# Patient Record
Sex: FEMALE | Race: OTHER | Employment: FULL TIME | URBAN - METROPOLITAN AREA
[De-identification: names, ages, dates, MRNs, and addresses within clinical notes are randomized per-mention and may not be internally consistent; named-entity substitution may affect disease eponyms.]

---

## 2017-02-07 ENCOUNTER — HOSPITAL ENCOUNTER (OUTPATIENT)
Age: 42
Setting detail: SPECIMEN
Discharge: HOME | End: 2017-02-07
Payer: COMMERCIAL

## 2017-02-07 ENCOUNTER — OFFICE VISIT (OUTPATIENT)
Dept: OBGYN | Facility: CLINIC | Age: 42
End: 2017-02-07

## 2017-02-07 VITALS
DIASTOLIC BLOOD PRESSURE: 86 MMHG | WEIGHT: 160 LBS | RESPIRATION RATE: 16 BRPM | HEIGHT: 64 IN | HEART RATE: 66 BPM | SYSTOLIC BLOOD PRESSURE: 130 MMHG | BODY MASS INDEX: 27.31 KG/M2

## 2017-02-07 DIAGNOSIS — R35.0 URINE FREQUENCY: Primary | ICD-10-CM

## 2017-02-07 DIAGNOSIS — N93.9 ABNORMAL UTERINE BLEEDING (AUB): ICD-10-CM

## 2017-02-07 DIAGNOSIS — R82.90 ABNORMAL URINE ODOR: ICD-10-CM

## 2017-02-07 PROCEDURE — 81025 URINE PREGNANCY TEST: CPT | Performed by: OBSTETRICS & GYNECOLOGY

## 2017-02-07 PROCEDURE — 99213 OFFICE O/P EST LOW 20 MIN: CPT | Performed by: OBSTETRICS & GYNECOLOGY

## 2017-02-08 LAB
C TRACH DNA GENITAL QL NAA+PROBE: NEGATIVE
CULTURE: NORMAL
CULTURE: NORMAL
Lab: NORMAL
N. GONORRHOEAE DNA: NEGATIVE
SPECIMEN DESCRIPTION: NORMAL
STATUS: NORMAL

## 2017-02-14 ENCOUNTER — HOSPITAL ENCOUNTER (OUTPATIENT)
Dept: ULTRASOUND IMAGING | Age: 42
Discharge: HOME | End: 2017-02-14
Payer: COMMERCIAL

## 2017-02-14 DIAGNOSIS — N93.9 ABNORMAL UTERINE BLEEDING (AUB): ICD-10-CM

## 2017-02-14 PROCEDURE — 76856 US EXAM PELVIC COMPLETE: CPT

## 2017-02-14 PROCEDURE — 76830 TRANSVAGINAL US NON-OB: CPT

## 2017-02-15 ENCOUNTER — TELEPHONE (OUTPATIENT)
Dept: OBGYN | Facility: CLINIC | Age: 42
End: 2017-02-15

## 2017-02-16 ENCOUNTER — OFFICE VISIT (OUTPATIENT)
Dept: OBGYN | Facility: CLINIC | Age: 42
End: 2017-02-16

## 2017-02-16 VITALS — DIASTOLIC BLOOD PRESSURE: 77 MMHG | HEART RATE: 69 BPM | SYSTOLIC BLOOD PRESSURE: 122 MMHG

## 2017-02-16 DIAGNOSIS — Z09 FOLLOW UP: Primary | ICD-10-CM

## 2017-02-16 PROCEDURE — 99213 OFFICE O/P EST LOW 20 MIN: CPT | Performed by: ADVANCED PRACTICE MIDWIFE

## 2017-04-18 ENCOUNTER — OFFICE VISIT (OUTPATIENT)
Dept: OBGYN CLINIC | Age: 42
End: 2017-04-18
Payer: COMMERCIAL

## 2017-04-18 VITALS
DIASTOLIC BLOOD PRESSURE: 77 MMHG | HEIGHT: 65 IN | HEART RATE: 74 BPM | BODY MASS INDEX: 26.64 KG/M2 | SYSTOLIC BLOOD PRESSURE: 124 MMHG | WEIGHT: 159.9 LBS

## 2017-04-18 DIAGNOSIS — N93.9 ABNORMAL UTERINE BLEEDING (AUB): Primary | ICD-10-CM

## 2017-04-18 PROCEDURE — 99212 OFFICE O/P EST SF 10 MIN: CPT | Performed by: OBSTETRICS & GYNECOLOGY

## 2017-06-28 ENCOUNTER — OFFICE VISIT (OUTPATIENT)
Dept: OBGYN CLINIC | Age: 42
End: 2017-06-28
Payer: COMMERCIAL

## 2017-06-28 ENCOUNTER — HOSPITAL ENCOUNTER (OUTPATIENT)
Age: 42
Setting detail: SPECIMEN
Discharge: HOME | End: 2017-06-28
Payer: COMMERCIAL

## 2017-06-28 VITALS
SYSTOLIC BLOOD PRESSURE: 128 MMHG | OXYGEN SATURATION: 100 % | HEIGHT: 64 IN | DIASTOLIC BLOOD PRESSURE: 79 MMHG | BODY MASS INDEX: 27.14 KG/M2 | WEIGHT: 159 LBS | RESPIRATION RATE: 16 BRPM | HEART RATE: 63 BPM

## 2017-06-28 DIAGNOSIS — Z13.9 SCREENING: Primary | ICD-10-CM

## 2017-06-28 DIAGNOSIS — Z13.9 SCREENING: ICD-10-CM

## 2017-06-28 LAB
CONTROL: PRESENT
PREGNANCY TEST URINE, POC: NORMAL

## 2017-06-28 PROCEDURE — 81025 URINE PREGNANCY TEST: CPT | Performed by: OBSTETRICS & GYNECOLOGY

## 2017-06-28 PROCEDURE — 99401 PREV MED CNSL INDIV APPRX 15: CPT | Performed by: OBSTETRICS & GYNECOLOGY

## 2017-06-29 LAB
C. TRACHOMATIS DNA ,URINE: NEGATIVE
N. GONORRHOEAE DNA, URINE: NEGATIVE

## 2017-07-05 ENCOUNTER — HOSPITAL ENCOUNTER (OUTPATIENT)
Age: 42
Discharge: HOME | End: 2017-07-05
Payer: COMMERCIAL

## 2017-07-05 DIAGNOSIS — Z13.9 SCREENING: ICD-10-CM

## 2017-07-05 LAB
HEPATITIS B SURFACE ANTIGEN: NONREACTIVE
HEPATITIS C ANTIBODY: NONREACTIVE
HIV AG/AB: NONREACTIVE
T. PALLIDUM, IGG: NONREACTIVE

## 2017-07-05 PROCEDURE — 87389 HIV-1 AG W/HIV-1&-2 AB AG IA: CPT

## 2017-07-05 PROCEDURE — 86696 HERPES SIMPLEX TYPE 2 TEST: CPT

## 2017-07-05 PROCEDURE — 86695 HERPES SIMPLEX TYPE 1 TEST: CPT

## 2017-07-05 PROCEDURE — 86694 HERPES SIMPLEX NES ANTBDY: CPT

## 2017-07-05 PROCEDURE — 87340 HEPATITIS B SURFACE AG IA: CPT

## 2017-07-05 PROCEDURE — 86780 TREPONEMA PALLIDUM: CPT

## 2017-07-05 PROCEDURE — 86803 HEPATITIS C AB TEST: CPT

## 2017-07-05 PROCEDURE — 36415 COLL VENOUS BLD VENIPUNCTURE: CPT

## 2017-07-06 LAB
HERPES SIMPLEX VIRUS 1 IGG: 3.74
HERPES SIMPLEX VIRUS 2 IGG: 0.1
HERPES TYPE 1/2 IGM COMBINED: 1.73

## 2017-08-07 ENCOUNTER — HOSPITAL ENCOUNTER (OUTPATIENT)
Dept: ULTRASOUND IMAGING | Age: 42
Discharge: HOME | End: 2017-08-07
Payer: COMMERCIAL

## 2017-08-07 DIAGNOSIS — N93.9 ABNORMAL UTERINE BLEEDING (AUB): ICD-10-CM

## 2017-08-07 PROCEDURE — 76856 US EXAM PELVIC COMPLETE: CPT

## 2017-08-07 PROCEDURE — 76830 TRANSVAGINAL US NON-OB: CPT

## 2017-08-21 ENCOUNTER — HOSPITAL ENCOUNTER (OUTPATIENT)
Age: 42
Setting detail: SPECIMEN
Discharge: HOME | End: 2017-08-21
Payer: COMMERCIAL

## 2017-08-21 ENCOUNTER — OFFICE VISIT (OUTPATIENT)
Dept: OBGYN CLINIC | Age: 42
End: 2017-08-21
Payer: COMMERCIAL

## 2017-08-21 VITALS
BODY MASS INDEX: 27.14 KG/M2 | DIASTOLIC BLOOD PRESSURE: 84 MMHG | SYSTOLIC BLOOD PRESSURE: 126 MMHG | HEART RATE: 70 BPM | WEIGHT: 159 LBS | HEIGHT: 64 IN

## 2017-08-21 DIAGNOSIS — N89.8 VAGINAL DISCHARGE: ICD-10-CM

## 2017-08-21 DIAGNOSIS — N89.8 VAGINAL DISCHARGE: Primary | ICD-10-CM

## 2017-08-21 DIAGNOSIS — N89.8 VAGINAL ODOR: ICD-10-CM

## 2017-08-21 LAB
DIRECT EXAM: ABNORMAL
Lab: ABNORMAL
SPECIMEN DESCRIPTION: ABNORMAL
STATUS: ABNORMAL

## 2017-08-21 PROCEDURE — 99213 OFFICE O/P EST LOW 20 MIN: CPT | Performed by: ADVANCED PRACTICE MIDWIFE

## 2017-08-22 ENCOUNTER — TELEPHONE (OUTPATIENT)
Dept: OBGYN CLINIC | Age: 42
End: 2017-08-22

## 2017-08-22 DIAGNOSIS — B96.89 BV (BACTERIAL VAGINOSIS): Primary | ICD-10-CM

## 2017-08-22 DIAGNOSIS — N76.0 BV (BACTERIAL VAGINOSIS): Primary | ICD-10-CM

## 2017-08-22 LAB
CULTURE: NORMAL
CULTURE: NORMAL
Lab: NORMAL
SPECIMEN DESCRIPTION: NORMAL
STATUS: NORMAL

## 2017-08-22 RX ORDER — METRONIDAZOLE 500 MG/1
500 TABLET ORAL 2 TIMES DAILY
Qty: 14 TABLET | Refills: 0 | Status: SHIPPED | OUTPATIENT
Start: 2017-08-22 | End: 2017-08-29

## 2017-08-24 LAB
CULTURE: ABNORMAL
Lab: ABNORMAL
SPECIMEN DESCRIPTION: ABNORMAL
STATUS: ABNORMAL

## 2017-08-31 ENCOUNTER — APPOINTMENT (OUTPATIENT)
Dept: GENERAL RADIOLOGY | Age: 42
End: 2017-08-31
Payer: COMMERCIAL

## 2017-08-31 ENCOUNTER — HOSPITAL ENCOUNTER (EMERGENCY)
Age: 42
Discharge: HOME | End: 2017-09-01
Attending: EMERGENCY MEDICINE
Payer: COMMERCIAL

## 2017-08-31 ENCOUNTER — APPOINTMENT (OUTPATIENT)
Dept: CT IMAGING | Age: 42
End: 2017-08-31
Payer: COMMERCIAL

## 2017-08-31 VITALS
DIASTOLIC BLOOD PRESSURE: 88 MMHG | WEIGHT: 162.06 LBS | SYSTOLIC BLOOD PRESSURE: 138 MMHG | OXYGEN SATURATION: 98 % | RESPIRATION RATE: 16 BRPM | HEART RATE: 89 BPM | HEIGHT: 64 IN | TEMPERATURE: 97.9 F | BODY MASS INDEX: 27.67 KG/M2

## 2017-08-31 DIAGNOSIS — G62.9 NEUROPATHY: ICD-10-CM

## 2017-08-31 DIAGNOSIS — M62.838 CERVICAL PARASPINAL MUSCLE SPASM: ICD-10-CM

## 2017-08-31 DIAGNOSIS — M54.17 RADICULOPATHY OF LUMBOSACRAL REGION: Primary | ICD-10-CM

## 2017-08-31 LAB
ABSOLUTE EOS #: 0.2 K/UL (ref 0–0.4)
ABSOLUTE LYMPH #: 2.8 K/UL (ref 1–4.8)
ABSOLUTE MONO #: 0.5 K/UL (ref 0.2–0.8)
ANION GAP SERPL CALCULATED.3IONS-SCNC: 13 MMOL/L (ref 9–17)
BASOPHILS # BLD: 1 %
BASOPHILS ABSOLUTE: 0.1 K/UL (ref 0–0.2)
BUN BLDV-MCNC: 14 MG/DL (ref 6–20)
BUN/CREAT BLD: 25 (ref 9–20)
CALCIUM SERPL-MCNC: 9 MG/DL (ref 8.6–10.4)
CHLORIDE BLD-SCNC: 100 MMOL/L (ref 98–107)
CO2: 23 MMOL/L (ref 20–31)
CREAT SERPL-MCNC: 0.57 MG/DL (ref 0.5–0.9)
DIFFERENTIAL TYPE: NORMAL
EOSINOPHILS RELATIVE PERCENT: 2 %
GFR AFRICAN AMERICAN: >60 ML/MIN
GFR NON-AFRICAN AMERICAN: >60 ML/MIN
GFR SERPL CREATININE-BSD FRML MDRD: ABNORMAL ML/MIN/{1.73_M2}
GFR SERPL CREATININE-BSD FRML MDRD: ABNORMAL ML/MIN/{1.73_M2}
GLUCOSE BLD-MCNC: 87 MG/DL (ref 70–99)
HCT VFR BLD CALC: 37.5 % (ref 36–46)
HEMOGLOBIN: 12.6 G/DL (ref 12–16)
LYMPHOCYTES # BLD: 38 %
MAGNESIUM: 2 MG/DL (ref 1.6–2.6)
MCH RBC QN AUTO: 29.4 PG (ref 26–34)
MCHC RBC AUTO-ENTMCNC: 33.6 G/DL (ref 31–37)
MCV RBC AUTO: 87.4 FL (ref 80–100)
MONOCYTES # BLD: 7 %
PDW BLD-RTO: 13.1 % (ref 11.5–14.5)
PLATELET # BLD: 306 K/UL (ref 130–400)
PLATELET ESTIMATE: NORMAL
PMV BLD AUTO: NORMAL FL (ref 6–12)
POTASSIUM SERPL-SCNC: 4.1 MMOL/L (ref 3.7–5.3)
RBC # BLD: 4.29 M/UL (ref 4–5.2)
RBC # BLD: NORMAL 10*6/UL
SEG NEUTROPHILS: 52 %
SEGMENTED NEUTROPHILS ABSOLUTE COUNT: 3.8 K/UL (ref 1.8–7.7)
SODIUM BLD-SCNC: 136 MMOL/L (ref 135–144)
THYROXINE, FREE: 1.62 NG/DL (ref 0.93–1.7)
TSH SERPL DL<=0.05 MIU/L-ACNC: 0.13 MIU/L (ref 0.3–5)
WBC # BLD: 7.4 K/UL (ref 3.5–11)
WBC # BLD: NORMAL 10*3/UL

## 2017-08-31 PROCEDURE — 84443 ASSAY THYROID STIM HORMONE: CPT

## 2017-08-31 PROCEDURE — 80048 BASIC METABOLIC PNL TOTAL CA: CPT

## 2017-08-31 PROCEDURE — 72100 X-RAY EXAM L-S SPINE 2/3 VWS: CPT

## 2017-08-31 PROCEDURE — 70450 CT HEAD/BRAIN W/O DYE: CPT

## 2017-08-31 PROCEDURE — 83735 ASSAY OF MAGNESIUM: CPT

## 2017-08-31 PROCEDURE — 85025 COMPLETE CBC W/AUTO DIFF WBC: CPT

## 2017-08-31 PROCEDURE — 99285 EMERGENCY DEPT VISIT HI MDM: CPT

## 2017-08-31 PROCEDURE — 72040 X-RAY EXAM NECK SPINE 2-3 VW: CPT

## 2017-08-31 PROCEDURE — 84439 ASSAY OF FREE THYROXINE: CPT

## 2017-08-31 RX ORDER — METRONIDAZOLE 500 MG/1
500 TABLET ORAL 3 TIMES DAILY
Status: ON HOLD | COMMUNITY
End: 2019-03-07

## 2017-08-31 RX ORDER — IBUPROFEN 800 MG/1
800 TABLET ORAL EVERY 8 HOURS PRN
Qty: 30 TABLET | Refills: 0 | Status: ON HOLD | OUTPATIENT
Start: 2017-08-31 | End: 2019-03-07 | Stop reason: SDUPTHER

## 2017-08-31 RX ORDER — METHOCARBAMOL 750 MG/1
750 TABLET, FILM COATED ORAL 4 TIMES DAILY
Qty: 40 TABLET | Refills: 0 | Status: SHIPPED | OUTPATIENT
Start: 2017-08-31 | End: 2017-09-10

## 2017-09-05 ENCOUNTER — TELEPHONE (OUTPATIENT)
Dept: OBGYN CLINIC | Age: 42
End: 2017-09-05

## 2019-01-03 ENCOUNTER — TELEPHONE (OUTPATIENT)
Dept: OBGYN CLINIC | Age: 44
End: 2019-01-03

## 2019-01-10 ENCOUNTER — OFFICE VISIT (OUTPATIENT)
Dept: OBGYN CLINIC | Age: 44
End: 2019-01-10
Payer: COMMERCIAL

## 2019-01-10 ENCOUNTER — HOSPITAL ENCOUNTER (OUTPATIENT)
Age: 44
Setting detail: SPECIMEN
Discharge: HOME | End: 2019-01-10
Payer: COMMERCIAL

## 2019-01-10 VITALS
DIASTOLIC BLOOD PRESSURE: 64 MMHG | WEIGHT: 160.25 LBS | SYSTOLIC BLOOD PRESSURE: 132 MMHG | HEART RATE: 77 BPM | BODY MASS INDEX: 27.36 KG/M2 | HEIGHT: 64 IN

## 2019-01-10 DIAGNOSIS — Z11.3 SCREENING EXAMINATION FOR STD (SEXUALLY TRANSMITTED DISEASE): ICD-10-CM

## 2019-01-10 DIAGNOSIS — K42.9 UMBILICAL HERNIA WITHOUT OBSTRUCTION AND WITHOUT GANGRENE: ICD-10-CM

## 2019-01-10 DIAGNOSIS — Z01.419 WELL WOMAN EXAM WITH ROUTINE GYNECOLOGICAL EXAM: Primary | ICD-10-CM

## 2019-01-10 PROCEDURE — 99396 PREV VISIT EST AGE 40-64: CPT | Performed by: OBSTETRICS & GYNECOLOGY

## 2019-01-10 PROCEDURE — G8484 FLU IMMUNIZE NO ADMIN: HCPCS | Performed by: OBSTETRICS & GYNECOLOGY

## 2019-01-10 RX ORDER — NORGESTIMATE AND ETHINYL ESTRADIOL 0.25-0.035
1 KIT ORAL DAILY
Qty: 1 PACKET | Refills: 11 | Status: ON HOLD | OUTPATIENT
Start: 2019-01-10 | End: 2019-03-07

## 2019-01-12 LAB
CHLAMYDIA BY THIN PREP: NEGATIVE
N. GONORRHOEAE DNA, THIN PREP: NEGATIVE

## 2019-01-22 ENCOUNTER — HOSPITAL ENCOUNTER (OUTPATIENT)
Age: 44
Discharge: HOME | End: 2019-01-22
Payer: COMMERCIAL

## 2019-01-22 DIAGNOSIS — Z11.3 SCREENING EXAMINATION FOR STD (SEXUALLY TRANSMITTED DISEASE): ICD-10-CM

## 2019-01-22 LAB
HEPATITIS C ANTIBODY: NONREACTIVE
HIV AG/AB: NONREACTIVE
T. PALLIDUM, IGG: NONREACTIVE

## 2019-01-22 PROCEDURE — 36415 COLL VENOUS BLD VENIPUNCTURE: CPT

## 2019-01-22 PROCEDURE — 86780 TREPONEMA PALLIDUM: CPT

## 2019-01-22 PROCEDURE — 86695 HERPES SIMPLEX TYPE 1 TEST: CPT

## 2019-01-22 PROCEDURE — 86694 HERPES SIMPLEX NES ANTBDY: CPT

## 2019-01-22 PROCEDURE — 86803 HEPATITIS C AB TEST: CPT

## 2019-01-22 PROCEDURE — 87389 HIV-1 AG W/HIV-1&-2 AB AG IA: CPT

## 2019-01-22 PROCEDURE — 86696 HERPES SIMPLEX TYPE 2 TEST: CPT

## 2019-01-24 LAB
HERPES SIMPLEX VIRUS 1 IGG: 3.4
HERPES SIMPLEX VIRUS 2 IGG: 0.25
HERPES TYPE 1/2 IGM COMBINED: 1.25

## 2019-01-25 LAB — CYTOLOGY REPORT: NORMAL

## 2019-01-28 ENCOUNTER — TELEPHONE (OUTPATIENT)
Dept: OBGYN CLINIC | Age: 44
End: 2019-01-28

## 2019-03-07 ENCOUNTER — HOSPITAL ENCOUNTER (OUTPATIENT)
Age: 44
Setting detail: OUTPATIENT SURGERY
Discharge: HOME | End: 2019-03-07
Attending: SURGERY | Admitting: SURGERY
Payer: COMMERCIAL

## 2019-03-07 ENCOUNTER — ANESTHESIA EVENT (OUTPATIENT)
Dept: OPERATING ROOM | Age: 44
End: 2019-03-07
Payer: COMMERCIAL

## 2019-03-07 ENCOUNTER — ANESTHESIA (OUTPATIENT)
Dept: OPERATING ROOM | Age: 44
End: 2019-03-07
Payer: COMMERCIAL

## 2019-03-07 VITALS
TEMPERATURE: 96.4 F | OXYGEN SATURATION: 100 % | RESPIRATION RATE: 12 BRPM | DIASTOLIC BLOOD PRESSURE: 83 MMHG | SYSTOLIC BLOOD PRESSURE: 126 MMHG

## 2019-03-07 VITALS
RESPIRATION RATE: 12 BRPM | HEART RATE: 61 BPM | HEIGHT: 65 IN | DIASTOLIC BLOOD PRESSURE: 71 MMHG | WEIGHT: 160 LBS | BODY MASS INDEX: 26.66 KG/M2 | OXYGEN SATURATION: 96 % | TEMPERATURE: 97.3 F | SYSTOLIC BLOOD PRESSURE: 128 MMHG

## 2019-03-07 PROBLEM — K42.9 UMBILICAL HERNIA WITHOUT OBSTRUCTION AND WITHOUT GANGRENE: Chronic | Status: ACTIVE | Noted: 2019-03-07

## 2019-03-07 PROBLEM — N93.9 ABNORMAL UTERINE BLEEDING: Status: ACTIVE | Noted: 2017-08-11

## 2019-03-07 PROBLEM — Z98.890 S/P HERNIA REPAIR: Chronic | Status: ACTIVE | Noted: 2019-03-07

## 2019-03-07 PROBLEM — E03.9 HYPOTHYROIDISM: Status: ACTIVE | Noted: 2017-01-19

## 2019-03-07 PROBLEM — Z87.19 S/P HERNIA REPAIR: Chronic | Status: ACTIVE | Noted: 2019-03-07

## 2019-03-07 LAB
-: NORMAL
HCG, PREGNANCY URINE (POC): NEGATIVE

## 2019-03-07 PROCEDURE — 2580000003 HC RX 258: Performed by: ANESTHESIOLOGY

## 2019-03-07 PROCEDURE — 7100000031 HC ASPR PHASE II RECOVERY - ADDTL 15 MIN: Performed by: SURGERY

## 2019-03-07 PROCEDURE — 7100000010 HC PHASE II RECOVERY - FIRST 15 MIN: Performed by: SURGERY

## 2019-03-07 PROCEDURE — C1781 MESH (IMPLANTABLE): HCPCS | Performed by: SURGERY

## 2019-03-07 PROCEDURE — 2500000003 HC RX 250 WO HCPCS: Performed by: SURGERY

## 2019-03-07 PROCEDURE — 3600000012 HC SURGERY LEVEL 2 ADDTL 15MIN: Performed by: SURGERY

## 2019-03-07 PROCEDURE — 2500000003 HC RX 250 WO HCPCS: Performed by: NURSE ANESTHETIST, CERTIFIED REGISTERED

## 2019-03-07 PROCEDURE — 3700000000 HC ANESTHESIA ATTENDED CARE: Performed by: SURGERY

## 2019-03-07 PROCEDURE — 2709999900 HC NON-CHARGEABLE SUPPLY: Performed by: SURGERY

## 2019-03-07 PROCEDURE — 3600000002 HC SURGERY LEVEL 2 BASE: Performed by: SURGERY

## 2019-03-07 PROCEDURE — 3700000001 HC ADD 15 MINUTES (ANESTHESIA): Performed by: SURGERY

## 2019-03-07 PROCEDURE — 7100000011 HC PHASE II RECOVERY - ADDTL 15 MIN: Performed by: SURGERY

## 2019-03-07 PROCEDURE — 6360000002 HC RX W HCPCS: Performed by: SURGERY

## 2019-03-07 PROCEDURE — 84703 CHORIONIC GONADOTROPIN ASSAY: CPT

## 2019-03-07 PROCEDURE — 7100000030 HC ASPR PHASE II RECOVERY - FIRST 15 MIN: Performed by: SURGERY

## 2019-03-07 PROCEDURE — 7100000001 HC PACU RECOVERY - ADDTL 15 MIN: Performed by: SURGERY

## 2019-03-07 PROCEDURE — 88302 TISSUE EXAM BY PATHOLOGIST: CPT

## 2019-03-07 PROCEDURE — 6360000002 HC RX W HCPCS: Performed by: ANESTHESIOLOGY

## 2019-03-07 PROCEDURE — 6360000002 HC RX W HCPCS: Performed by: NURSE ANESTHETIST, CERTIFIED REGISTERED

## 2019-03-07 PROCEDURE — 7100000000 HC PACU RECOVERY - FIRST 15 MIN: Performed by: SURGERY

## 2019-03-07 DEVICE — IMPLANTABLE DEVICE: Type: IMPLANTABLE DEVICE | Site: UMBILICAL | Status: FUNCTIONAL

## 2019-03-07 RX ORDER — IBUPROFEN 800 MG/1
800 TABLET ORAL EVERY 8 HOURS PRN
Qty: 30 TABLET | Refills: 1 | Status: ON HOLD | OUTPATIENT
Start: 2019-03-07 | End: 2022-01-07

## 2019-03-07 RX ORDER — OXYCODONE HYDROCHLORIDE AND ACETAMINOPHEN 5; 325 MG/1; MG/1
1 TABLET ORAL
Status: DISCONTINUED | OUTPATIENT
Start: 2019-03-07 | End: 2019-03-07 | Stop reason: HOSPADM

## 2019-03-07 RX ORDER — MEPERIDINE HYDROCHLORIDE 50 MG/ML
12.5 INJECTION INTRAMUSCULAR; INTRAVENOUS; SUBCUTANEOUS EVERY 5 MIN PRN
Status: DISCONTINUED | OUTPATIENT
Start: 2019-03-07 | End: 2019-03-07 | Stop reason: HOSPADM

## 2019-03-07 RX ORDER — SODIUM CHLORIDE, SODIUM LACTATE, POTASSIUM CHLORIDE, CALCIUM CHLORIDE 600; 310; 30; 20 MG/100ML; MG/100ML; MG/100ML; MG/100ML
INJECTION, SOLUTION INTRAVENOUS CONTINUOUS
Status: DISCONTINUED | OUTPATIENT
Start: 2019-03-07 | End: 2019-03-07 | Stop reason: HOSPADM

## 2019-03-07 RX ORDER — MORPHINE SULFATE 2 MG/ML
2 INJECTION, SOLUTION INTRAMUSCULAR; INTRAVENOUS EVERY 5 MIN PRN
Status: DISCONTINUED | OUTPATIENT
Start: 2019-03-07 | End: 2019-03-07 | Stop reason: HOSPADM

## 2019-03-07 RX ORDER — DEXAMETHASONE SODIUM PHOSPHATE 4 MG/ML
INJECTION, SOLUTION INTRA-ARTICULAR; INTRALESIONAL; INTRAMUSCULAR; INTRAVENOUS; SOFT TISSUE PRN
Status: DISCONTINUED | OUTPATIENT
Start: 2019-03-07 | End: 2019-03-07 | Stop reason: SDUPTHER

## 2019-03-07 RX ORDER — LIDOCAINE HYDROCHLORIDE 10 MG/ML
INJECTION, SOLUTION EPIDURAL; INFILTRATION; INTRACAUDAL; PERINEURAL PRN
Status: DISCONTINUED | OUTPATIENT
Start: 2019-03-07 | End: 2019-03-07 | Stop reason: SDUPTHER

## 2019-03-07 RX ORDER — MIDAZOLAM HYDROCHLORIDE 1 MG/ML
INJECTION INTRAMUSCULAR; INTRAVENOUS PRN
Status: DISCONTINUED | OUTPATIENT
Start: 2019-03-07 | End: 2019-03-07 | Stop reason: SDUPTHER

## 2019-03-07 RX ORDER — PROPOFOL 10 MG/ML
INJECTION, EMULSION INTRAVENOUS PRN
Status: DISCONTINUED | OUTPATIENT
Start: 2019-03-07 | End: 2019-03-07 | Stop reason: SDUPTHER

## 2019-03-07 RX ORDER — FENTANYL CITRATE 50 UG/ML
INJECTION, SOLUTION INTRAMUSCULAR; INTRAVENOUS PRN
Status: DISCONTINUED | OUTPATIENT
Start: 2019-03-07 | End: 2019-03-07 | Stop reason: SDUPTHER

## 2019-03-07 RX ORDER — ONDANSETRON 2 MG/ML
INJECTION INTRAMUSCULAR; INTRAVENOUS PRN
Status: DISCONTINUED | OUTPATIENT
Start: 2019-03-07 | End: 2019-03-07 | Stop reason: SDUPTHER

## 2019-03-07 RX ORDER — DIPHENHYDRAMINE HYDROCHLORIDE 50 MG/ML
12.5 INJECTION INTRAMUSCULAR; INTRAVENOUS
Status: DISCONTINUED | OUTPATIENT
Start: 2019-03-07 | End: 2019-03-07 | Stop reason: HOSPADM

## 2019-03-07 RX ORDER — ONDANSETRON 2 MG/ML
4 INJECTION INTRAMUSCULAR; INTRAVENOUS
Status: DISCONTINUED | OUTPATIENT
Start: 2019-03-07 | End: 2019-03-07 | Stop reason: HOSPADM

## 2019-03-07 RX ORDER — M-VIT,TX,IRON,MINS/CALC/FOLIC 27MG-0.4MG
1 TABLET ORAL DAILY
COMMUNITY

## 2019-03-07 RX ORDER — KETOROLAC TROMETHAMINE 30 MG/ML
INJECTION, SOLUTION INTRAMUSCULAR; INTRAVENOUS PRN
Status: DISCONTINUED | OUTPATIENT
Start: 2019-03-07 | End: 2019-03-07 | Stop reason: SDUPTHER

## 2019-03-07 RX ORDER — BUPIVACAINE HYDROCHLORIDE 5 MG/ML
INJECTION, SOLUTION EPIDURAL; INTRACAUDAL PRN
Status: DISCONTINUED | OUTPATIENT
Start: 2019-03-07 | End: 2019-03-07 | Stop reason: ALTCHOICE

## 2019-03-07 RX ADMIN — Medication 2 G: at 07:57

## 2019-03-07 RX ADMIN — MIDAZOLAM 2 MG: 1 INJECTION INTRAMUSCULAR; INTRAVENOUS at 07:57

## 2019-03-07 RX ADMIN — FENTANYL CITRATE 100 MCG: 50 INJECTION, SOLUTION INTRAMUSCULAR; INTRAVENOUS at 08:02

## 2019-03-07 RX ADMIN — HYDROMORPHONE HYDROCHLORIDE 0.5 MG: 1 INJECTION, SOLUTION INTRAMUSCULAR; INTRAVENOUS; SUBCUTANEOUS at 08:53

## 2019-03-07 RX ADMIN — PROPOFOL 150 MG: 10 INJECTION, EMULSION INTRAVENOUS at 08:02

## 2019-03-07 RX ADMIN — ONDANSETRON 4 MG: 2 INJECTION INTRAMUSCULAR; INTRAVENOUS at 08:20

## 2019-03-07 RX ADMIN — LIDOCAINE HYDROCHLORIDE 50 MG: 10 INJECTION, SOLUTION EPIDURAL; INFILTRATION; INTRACAUDAL; PERINEURAL at 08:02

## 2019-03-07 RX ADMIN — DEXAMETHASONE SODIUM PHOSPHATE 4 MG: 4 INJECTION, SOLUTION INTRAMUSCULAR; INTRAVENOUS at 08:20

## 2019-03-07 RX ADMIN — SODIUM CHLORIDE, POTASSIUM CHLORIDE, SODIUM LACTATE AND CALCIUM CHLORIDE: 600; 310; 30; 20 INJECTION, SOLUTION INTRAVENOUS at 07:00

## 2019-03-07 RX ADMIN — KETOROLAC TROMETHAMINE 30 MG: 30 INJECTION, SOLUTION INTRAMUSCULAR at 08:18

## 2019-03-07 RX ADMIN — SODIUM CHLORIDE, POTASSIUM CHLORIDE, SODIUM LACTATE AND CALCIUM CHLORIDE: 600; 310; 30; 20 INJECTION, SOLUTION INTRAVENOUS at 08:28

## 2019-03-07 ASSESSMENT — PULMONARY FUNCTION TESTS
PIF_VALUE: 16
PIF_VALUE: 15
PIF_VALUE: 1
PIF_VALUE: 15
PIF_VALUE: 16
PIF_VALUE: 14
PIF_VALUE: 15
PIF_VALUE: 18
PIF_VALUE: 15
PIF_VALUE: 16
PIF_VALUE: 19
PIF_VALUE: 17
PIF_VALUE: 15
PIF_VALUE: 19
PIF_VALUE: 22
PIF_VALUE: 12
PIF_VALUE: 16
PIF_VALUE: 12
PIF_VALUE: 16
PIF_VALUE: 19
PIF_VALUE: 18
PIF_VALUE: 19
PIF_VALUE: 1
PIF_VALUE: 17
PIF_VALUE: 1
PIF_VALUE: 16
PIF_VALUE: 14
PIF_VALUE: 16
PIF_VALUE: 17
PIF_VALUE: 20
PIF_VALUE: 16
PIF_VALUE: 18
PIF_VALUE: 14
PIF_VALUE: 16
PIF_VALUE: 18
PIF_VALUE: 18
PIF_VALUE: 15

## 2019-03-07 ASSESSMENT — PAIN SCALES - GENERAL
PAINLEVEL_OUTOF10: 5
PAINLEVEL_OUTOF10: 4
PAINLEVEL_OUTOF10: 3
PAINLEVEL_OUTOF10: 0

## 2019-03-07 ASSESSMENT — PAIN - FUNCTIONAL ASSESSMENT: PAIN_FUNCTIONAL_ASSESSMENT: 0-10

## 2019-03-07 ASSESSMENT — PAIN DESCRIPTION - DESCRIPTORS
DESCRIPTORS: DISCOMFORT
DESCRIPTORS: SORE

## 2019-03-07 ASSESSMENT — PAIN DESCRIPTION - LOCATION
LOCATION: ABDOMEN
LOCATION: ABDOMEN

## 2019-03-11 LAB — SURGICAL PATHOLOGY REPORT: NORMAL

## 2019-04-01 ENCOUNTER — HOSPITAL ENCOUNTER (EMERGENCY)
Age: 44
Discharge: HOME | End: 2019-04-01
Attending: EMERGENCY MEDICINE
Payer: COMMERCIAL

## 2019-04-01 ENCOUNTER — APPOINTMENT (OUTPATIENT)
Dept: CT IMAGING | Age: 44
End: 2019-04-01
Payer: COMMERCIAL

## 2019-04-01 VITALS
TEMPERATURE: 98.3 F | DIASTOLIC BLOOD PRESSURE: 89 MMHG | HEART RATE: 77 BPM | SYSTOLIC BLOOD PRESSURE: 127 MMHG | RESPIRATION RATE: 16 BRPM | BODY MASS INDEX: 27.83 KG/M2 | HEIGHT: 64 IN | OXYGEN SATURATION: 97 % | WEIGHT: 163 LBS

## 2019-04-01 DIAGNOSIS — G89.18 POSTOPERATIVE ABDOMINAL PAIN: ICD-10-CM

## 2019-04-01 DIAGNOSIS — R10.9 POSTOPERATIVE ABDOMINAL PAIN: ICD-10-CM

## 2019-04-01 DIAGNOSIS — L03.311 CELLULITIS OF ABDOMINAL WALL: Primary | ICD-10-CM

## 2019-04-01 LAB
ABSOLUTE EOS #: 0.1 K/UL (ref 0–0.4)
ABSOLUTE IMMATURE GRANULOCYTE: ABNORMAL K/UL (ref 0–0.3)
ABSOLUTE LYMPH #: 2.1 K/UL (ref 1–4.8)
ABSOLUTE MONO #: 0.4 K/UL (ref 0.2–0.8)
ALBUMIN SERPL-MCNC: 4.2 G/DL (ref 3.5–5.2)
ALBUMIN/GLOBULIN RATIO: ABNORMAL (ref 1–2.5)
ALP BLD-CCNC: 56 U/L (ref 35–104)
ALT SERPL-CCNC: 11 U/L (ref 5–33)
ANION GAP SERPL CALCULATED.3IONS-SCNC: 13 MMOL/L (ref 9–17)
AST SERPL-CCNC: 12 U/L
BASOPHILS # BLD: 1 % (ref 0–2)
BASOPHILS ABSOLUTE: 0 K/UL (ref 0–0.2)
BILIRUB SERPL-MCNC: <0.1 MG/DL (ref 0.3–1.2)
BILIRUBIN DIRECT: <0.08 MG/DL
BILIRUBIN, INDIRECT: ABNORMAL MG/DL (ref 0–1)
BUN BLDV-MCNC: 15 MG/DL (ref 6–20)
BUN/CREAT BLD: 22 (ref 9–20)
CALCIUM SERPL-MCNC: 9 MG/DL (ref 8.6–10.4)
CHLORIDE BLD-SCNC: 100 MMOL/L (ref 98–107)
CHP ED QC CHECK: NORMAL
CO2: 24 MMOL/L (ref 20–31)
CREAT SERPL-MCNC: 0.68 MG/DL (ref 0.5–0.9)
DIFFERENTIAL TYPE: ABNORMAL
EOSINOPHILS RELATIVE PERCENT: 1 % (ref 1–4)
GFR AFRICAN AMERICAN: >60 ML/MIN
GFR NON-AFRICAN AMERICAN: >60 ML/MIN
GFR SERPL CREATININE-BSD FRML MDRD: ABNORMAL ML/MIN/{1.73_M2}
GFR SERPL CREATININE-BSD FRML MDRD: ABNORMAL ML/MIN/{1.73_M2}
GLOBULIN: ABNORMAL G/DL (ref 1.5–3.8)
GLUCOSE BLD-MCNC: 102 MG/DL (ref 70–99)
HCT VFR BLD CALC: 30.2 % (ref 36–46)
HEMOGLOBIN: 9.8 G/DL (ref 12–16)
IMMATURE GRANULOCYTES: ABNORMAL %
LIPASE: 82 U/L (ref 13–60)
LYMPHOCYTES # BLD: 36 % (ref 24–44)
MCH RBC QN AUTO: 24.8 PG (ref 26–34)
MCHC RBC AUTO-ENTMCNC: 32.6 G/DL (ref 31–37)
MCV RBC AUTO: 76 FL (ref 80–100)
MONOCYTES # BLD: 6 % (ref 1–7)
NRBC AUTOMATED: ABNORMAL PER 100 WBC
PDW BLD-RTO: 16.6 % (ref 11.5–14.5)
PLATELET # BLD: 370 K/UL (ref 130–400)
PLATELET ESTIMATE: ABNORMAL
PMV BLD AUTO: 6.8 FL (ref 6–12)
POTASSIUM SERPL-SCNC: 4.1 MMOL/L (ref 3.7–5.3)
RBC # BLD: 3.97 M/UL (ref 4–5.2)
RBC # BLD: ABNORMAL 10*6/UL
SEG NEUTROPHILS: 56 % (ref 36–66)
SEGMENTED NEUTROPHILS ABSOLUTE COUNT: 3.2 K/UL (ref 1.8–7.7)
SODIUM BLD-SCNC: 137 MMOL/L (ref 135–144)
TOTAL PROTEIN: 7.2 G/DL (ref 6.4–8.3)
WBC # BLD: 5.8 K/UL (ref 3.5–11)
WBC # BLD: ABNORMAL 10*3/UL

## 2019-04-01 PROCEDURE — 81003 URINALYSIS AUTO W/O SCOPE: CPT

## 2019-04-01 PROCEDURE — 6360000004 HC RX CONTRAST MEDICATION: Performed by: EMERGENCY MEDICINE

## 2019-04-01 PROCEDURE — 80048 BASIC METABOLIC PNL TOTAL CA: CPT

## 2019-04-01 PROCEDURE — 83690 ASSAY OF LIPASE: CPT

## 2019-04-01 PROCEDURE — 85025 COMPLETE CBC W/AUTO DIFF WBC: CPT

## 2019-04-01 PROCEDURE — 99284 EMERGENCY DEPT VISIT MOD MDM: CPT

## 2019-04-01 PROCEDURE — 84703 CHORIONIC GONADOTROPIN ASSAY: CPT

## 2019-04-01 PROCEDURE — 80076 HEPATIC FUNCTION PANEL: CPT

## 2019-04-01 PROCEDURE — 74177 CT ABD & PELVIS W/CONTRAST: CPT

## 2019-04-01 PROCEDURE — 2580000003 HC RX 258: Performed by: EMERGENCY MEDICINE

## 2019-04-01 PROCEDURE — 6370000000 HC RX 637 (ALT 250 FOR IP): Performed by: EMERGENCY MEDICINE

## 2019-04-01 PROCEDURE — 96365 THER/PROPH/DIAG IV INF INIT: CPT

## 2019-04-01 PROCEDURE — 6360000002 HC RX W HCPCS: Performed by: EMERGENCY MEDICINE

## 2019-04-01 RX ORDER — SULFAMETHOXAZOLE AND TRIMETHOPRIM 800; 160 MG/1; MG/1
1 TABLET ORAL 2 TIMES DAILY
Status: ON HOLD | COMMUNITY
Start: 2019-03-31 | End: 2022-01-07

## 2019-04-01 RX ORDER — CEPHALEXIN 500 MG/1
500 CAPSULE ORAL 3 TIMES DAILY
Status: ON HOLD | COMMUNITY
Start: 2019-03-31 | End: 2022-01-07

## 2019-04-01 RX ORDER — 0.9 % SODIUM CHLORIDE 0.9 %
80 INTRAVENOUS SOLUTION INTRAVENOUS ONCE
Status: COMPLETED | OUTPATIENT
Start: 2019-04-01 | End: 2019-04-01

## 2019-04-01 RX ORDER — SODIUM CHLORIDE 0.9 % (FLUSH) 0.9 %
10 SYRINGE (ML) INJECTION PRN
Status: DISCONTINUED | OUTPATIENT
Start: 2019-04-01 | End: 2019-04-01 | Stop reason: HOSPADM

## 2019-04-01 RX ORDER — CLINDAMYCIN HYDROCHLORIDE 300 MG/1
300 CAPSULE ORAL 2 TIMES DAILY
Qty: 14 CAPSULE | Refills: 0 | Status: SHIPPED | OUTPATIENT
Start: 2019-04-01 | End: 2019-04-08

## 2019-04-01 RX ORDER — CLINDAMYCIN HYDROCHLORIDE 150 MG/1
300 CAPSULE ORAL ONCE
Status: COMPLETED | OUTPATIENT
Start: 2019-04-01 | End: 2019-04-01

## 2019-04-01 RX ADMIN — SODIUM CHLORIDE 1.5 G: 900 INJECTION INTRAVENOUS at 20:14

## 2019-04-01 RX ADMIN — Medication 10 ML: at 19:48

## 2019-04-01 RX ADMIN — IOPAMIDOL 75 ML: 755 INJECTION, SOLUTION INTRAVENOUS at 19:47

## 2019-04-01 RX ADMIN — SODIUM CHLORIDE 80 ML: 9 INJECTION, SOLUTION INTRAVENOUS at 19:48

## 2019-04-01 RX ADMIN — CLINDAMYCIN HYDROCHLORIDE 300 MG: 150 CAPSULE ORAL at 21:37

## 2019-04-01 ASSESSMENT — ENCOUNTER SYMPTOMS
WHEEZING: 0
NAUSEA: 0
COUGH: 0
SORE THROAT: 0
VOMITING: 0
EYE PAIN: 0
COLOR CHANGE: 1
ABDOMINAL PAIN: 1

## 2019-04-01 ASSESSMENT — PAIN SCALES - GENERAL: PAINLEVEL_OUTOF10: 2

## 2019-04-01 NOTE — ED PROVIDER NOTES
EMERGENCY DEPARTMENT ENCOUNTER    Pt Name: Josie Barajas  MRN: [id number]  [place] 1975  Date of evaluation: 19  CHIEF COMPLAINT       Chief Complaint   Patient presents with    Cellulitis     Seen at urgent care yesterday; given antibiotics     HISTORY OF PRESENT ILLNESS   27-year-old female presented to the ED for evaluation of swelling and redness over to the anterior abdominal wall. Patient is 3 weeks status post umbilical hernia repair. Patient was seen at urgent care yesterday for a developing cellulitis over to the abdominal wall region. Patient was prescribed Keflex and Bactrim and the area was relocated. Patient is here because the area is getting bigger and pain in the swelling is worsening. Patient denies fever chills  Patient denies nausea vomiting. Patient denies chest pain shortness of breath. REVIEW OF SYSTEMS     Review of Systems   Constitutional: Negative for chills and fever. HENT: Negative for nosebleeds and sore throat. Eyes: Negative for pain and visual disturbance. Respiratory: Negative for cough and wheezing. Gastrointestinal: Positive for abdominal pain. Negative for nausea and vomiting. Genitourinary: Negative for dysuria, flank pain and hematuria. Skin: Positive for color change and wound. Negative for rash. Neurological: Negative for light-headedness and headaches.      PASTMEDICAL HISTORY     Past Medical History:   Diagnosis Date    Heart palpitations     Hypothyroidism 2008    s/p radioiodine ablation      SURGICAL HISTORY       Past Surgical History:   Procedure Laterality Date    BREAST ENHANCEMENT SURGERY      x3     SECTION      failure to progress    DILATION AND CURETTAGE OF UTERUS      s/p SAB x 3    HAND SURGERY Right     CRPP R fifth     UMBILICAL HERNIA REPAIR N/A     HERNIA UMBILICAL REPAIR W/MESH performed by Dimitrios Mishra MD at [address] [place] [address]       Previous Medications    CEPHALEXIN (KEFLEX) 500 MG CAPSULE    Take 500 mg by mouth 3 times daily    IBUPROFEN (ADVIL;MOTRIN) 800 MG TABLET    Take 1 tablet by mouth every 8 hours as needed for Pain or Fever    LEVOTHYROXINE (SYNTHROID) 200 MCG TABLET    Take 0.5 tablets by mouth Daily. MULTIPLE VITAMINS-MINERALS (THERAPEUTIC MULTIVITAMIN-MINERALS) TABLET    Take 1 tablet by mouth daily    SULFAMETHOXAZOLE-TRIMETHOPRIM (BACTRIM DS;SEPTRA DS) 800-160 MG PER TABLET    Take 1 tablet by mouth 2 times daily     ALLERGIES     is allergic to bee venom. FAMILY HISTORY     indicated that the status of her other is unknown. SOCIAL HISTORY       Social History     Tobacco Use    Smoking status: Former Smoker     Types: Cigarettes    Smokeless tobacco: Never Used    Tobacco comment: ''Lots of 2nd hand smoke''   Substance Use Topics    Alcohol use: No    Drug use: No     PHYSICAL EXAM     INITIAL VITALS:   Vitals:    04/01/19 1854   BP: 127/89   Pulse: 77   Resp: 16   Temp: 98.3 °F (36.8 °C)   TempSrc: Oral   SpO2: 97%   Weight: 163 lb (73.9 kg)   Height: 5' 4\" (1.626 m)       Physical Exam   Abdominal: Soft. She exhibits no distension and no mass. There is tenderness. There is guarding. There is no rebound. No hernia. Large area of erythema and induration supra umbilical region consistent with cellulitis. Area spanning beyond just lead demarcated area of infection. Incision within normal limits no active drainage. No vesicles. MEDICAL DECISION MAKING:     Patient here for evaluation of abdominal wall abscess 3 weeks status post on the medical hernia repair. She currently taking Keflex and Bactrim prescribed at urgent care. Patient's blood work was within normal limits. CT abdomen and pelvis shows intra-abdominal fluid collection consistent with either abscess versus seroma. I paged patient's surgeon Dr. Zita Tapia. I discussed case with her. She was in agreement with plan to start clindamycin.   She is going to be seen patient in her office tomorrow morning at 8:30. ED Course as of Apr 01 2136 Mon Apr 01, 2019 2101 CT ABDOMEN PELVIS W IV CONTRAST Additional Contrast? None [JA]      ED Course User Index  [JA] Fredy Rockwell MD     CRITICAL CARE:       PROCEDURES:    Procedures    DIAGNOSTIC RESULTS   EKG:All EKG's are interpreted by the Emergency Department Physician who either signs or Co-signs this chart in the absence of a cardiologist.        RADIOLOGY:All plain film, CT, MRI, and formal ultrasound images (except ED bedside ultrasound) are read by the radiologist, see reports below, unless otherwisenoted in MDM or here. CT ABDOMEN PELVIS W IV CONTRAST Additional Contrast? None   Preliminary Result   1. There is a thick-walled subcutaneous fluid collection at the level of the   umbilicus suspicious for abscess measuring 1.9 x 1.5 x 1.4 cm. Just deep to   this there is a more simple appearing intraperitoneal fluid collection   measuring 4.1 x 1.8 x 3.5 cm, seroma versus abscess. These may be connected. There is no air within either of these collections. 2. Normal appendix. 3. Enlarged uterus, likely fibroid. LABS: All lab results were reviewed by myself, and all abnormals are listed below. Labs Reviewed   CBC WITH AUTO DIFFERENTIAL - Abnormal; Notable for the following components:       Result Value    RBC 3.97 (*)     Hemoglobin 9.8 (*)     Hematocrit 30.2 (*)     MCV 76.0 (*)     MCH 24.8 (*)     RDW 16.6 (*)     All other components within normal limits   BASIC METABOLIC PANEL W/ REFLEX TO MG FOR LOW K - Abnormal; Notable for the following components:    Glucose 102 (*)     Bun/Cre Ratio 22 (*)     All other components within normal limits   HEPATIC FUNCTION PANEL - Abnormal; Notable for the following components:     Total Bilirubin <0.10 (*)     All other components within normal limits   LIPASE - Abnormal; Notable for the following components:    Lipase 82 (*)     All other components within normal limits   POCT URINALYSIS DIPSTICK - Normal       EMERGENCY DEPARTMENTCOURSE:         Vitals:    Vitals:    04/01/19 1854   BP: 127/89   Pulse: 77   Resp: 16   Temp: 98.3 °F (36.8 °C)   TempSrc: Oral   SpO2: 97%   Weight: 163 lb (73.9 kg)   Height: 5' 4\" (1.626 m)       The patient was given the following medications while in the emergency department:  Orders Placed This Encounter   Medications    ampicillin-sulbactam (UNASYN) 1.5 g IVPB minibag    0.9 % sodium chloride bolus    sodium chloride flush 0.9 % injection 10 mL    iopamidol (ISOVUE-370) 76 % injection 75 mL    clindamycin (CLEOCIN) capsule 300 mg    clindamycin (CLEOCIN) 300 MG capsule     Sig: Take 1 capsule by mouth 2 times daily for 7 days     Dispense:  14 capsule     Refill:  0     CONSULTS:  None    FINAL IMPRESSION      1. Cellulitis of abdominal wall    2.  Postoperative abdominal pain          DISPOSITION/PLAN   DISPOSITION Decision To Discharge 04/01/2019 09:20:31 PM      PATIENT REFERRED TO:  Demetri Cisse MD  [facility]  [phone]    Go to   Go to  appointment tomorrow at 8:30 AM    DISCHARGE MEDICATIONS:  New Prescriptions    CLINDAMYCIN (CLEOCIN) 300 MG CAPSULE    Take 1 capsule by mouth 2 times daily for 7 days     Samanta Kennedy MD  Attending Emergency Physician                   Nayely Nettles MD  04/01/19 6268

## 2019-04-02 ENCOUNTER — HOSPITAL ENCOUNTER (OUTPATIENT)
Age: 44
Setting detail: SPECIMEN
Discharge: HOME | End: 2019-04-02
Payer: COMMERCIAL

## 2019-04-02 LAB — HCG, PREGNANCY URINE (POC): NEGATIVE

## 2019-04-02 PROCEDURE — 87070 CULTURE OTHR SPECIMN AEROBIC: CPT

## 2019-04-02 PROCEDURE — 87205 SMEAR GRAM STAIN: CPT

## 2019-04-02 PROCEDURE — 86403 PARTICLE AGGLUT ANTBDY SCRN: CPT

## 2019-04-02 NOTE — ED NOTES
Pt ambulatory to restroom with steady gait to obtain a urine specimen       Harsh Hill RN  04/01/19 2017

## 2019-04-04 LAB
CULTURE: ABNORMAL
DIRECT EXAM: ABNORMAL
DIRECT EXAM: ABNORMAL
Lab: ABNORMAL
SPECIMEN DESCRIPTION: ABNORMAL

## 2019-07-22 ENCOUNTER — HOSPITAL ENCOUNTER (OUTPATIENT)
Age: 44
Setting detail: SPECIMEN
Discharge: HOME | End: 2019-07-22
Payer: COMMERCIAL

## 2019-07-22 LAB — HCG QUALITATIVE: NEGATIVE

## 2019-07-30 ENCOUNTER — HOSPITAL ENCOUNTER (OUTPATIENT)
Age: 44
Discharge: HOME | End: 2019-07-30
Payer: COMMERCIAL

## 2019-07-30 LAB — T3 FREE: 3.18 PG/ML (ref 2.02–4.43)

## 2019-07-30 PROCEDURE — 36415 COLL VENOUS BLD VENIPUNCTURE: CPT

## 2019-07-30 PROCEDURE — 84481 FREE ASSAY (FT-3): CPT

## 2019-07-30 PROCEDURE — 84439 ASSAY OF FREE THYROXINE: CPT

## 2019-07-30 PROCEDURE — 80061 LIPID PANEL: CPT

## 2019-07-30 PROCEDURE — 84450 TRANSFERASE (AST) (SGOT): CPT

## 2019-07-30 PROCEDURE — 84443 ASSAY THYROID STIM HORMONE: CPT

## 2019-07-30 PROCEDURE — 84460 ALANINE AMINO (ALT) (SGPT): CPT

## 2019-07-31 LAB
ALT SERPL-CCNC: 10 U/L (ref 5–33)
AST SERPL-CCNC: 14 U/L
CHOLESTEROL, FASTING: 141 MG/DL
CHOLESTEROL/HDL RATIO: 2.4
HDLC SERPL-MCNC: 58 MG/DL
LDL CHOLESTEROL: 65 MG/DL (ref 0–130)
THYROXINE, FREE: 1.92 NG/DL (ref 0.93–1.7)
TRIGLYCERIDE, FASTING: 92 MG/DL
TSH SERPL DL<=0.05 MIU/L-ACNC: 0.09 MIU/L (ref 0.3–5)
VLDLC SERPL CALC-MCNC: NORMAL MG/DL (ref 1–30)

## 2019-08-08 ENCOUNTER — APPOINTMENT (OUTPATIENT)
Dept: GENERAL RADIOLOGY | Age: 44
End: 2019-08-08
Payer: COMMERCIAL

## 2019-08-08 ENCOUNTER — OFFICE VISIT (OUTPATIENT)
Dept: ORTHOPEDIC SURGERY | Age: 44
End: 2019-08-08
Payer: COMMERCIAL

## 2019-08-08 ENCOUNTER — HOSPITAL ENCOUNTER (EMERGENCY)
Age: 44
Discharge: HOME | End: 2019-08-08
Attending: EMERGENCY MEDICINE
Payer: COMMERCIAL

## 2019-08-08 VITALS — HEIGHT: 64 IN | BODY MASS INDEX: 27.33 KG/M2 | WEIGHT: 160.05 LBS

## 2019-08-08 VITALS
WEIGHT: 160 LBS | OXYGEN SATURATION: 99 % | SYSTOLIC BLOOD PRESSURE: 137 MMHG | TEMPERATURE: 98.6 F | HEIGHT: 64 IN | DIASTOLIC BLOOD PRESSURE: 65 MMHG | HEART RATE: 95 BPM | RESPIRATION RATE: 14 BRPM | BODY MASS INDEX: 27.31 KG/M2

## 2019-08-08 DIAGNOSIS — S52.91XA CLOSED FRACTURE OF DISTAL END OF RIGHT FOREARM, INITIAL ENCOUNTER: Primary | ICD-10-CM

## 2019-08-08 DIAGNOSIS — S52.501A CLOSED FRACTURE OF DISTAL END OF RIGHT RADIUS, UNSPECIFIED FRACTURE MORPHOLOGY, INITIAL ENCOUNTER: Primary | ICD-10-CM

## 2019-08-08 PROCEDURE — 73110 X-RAY EXAM OF WRIST: CPT

## 2019-08-08 PROCEDURE — 6360000002 HC RX W HCPCS: Performed by: EMERGENCY MEDICINE

## 2019-08-08 PROCEDURE — 96372 THER/PROPH/DIAG INJ SC/IM: CPT

## 2019-08-08 PROCEDURE — 73130 X-RAY EXAM OF HAND: CPT

## 2019-08-08 PROCEDURE — 99203 OFFICE O/P NEW LOW 30 MIN: CPT | Performed by: ORTHOPAEDIC SURGERY

## 2019-08-08 PROCEDURE — 29125 APPL SHORT ARM SPLINT STATIC: CPT

## 2019-08-08 PROCEDURE — G8427 DOCREV CUR MEDS BY ELIG CLIN: HCPCS | Performed by: ORTHOPAEDIC SURGERY

## 2019-08-08 PROCEDURE — G8419 CALC BMI OUT NRM PARAM NOF/U: HCPCS | Performed by: ORTHOPAEDIC SURGERY

## 2019-08-08 PROCEDURE — 1036F TOBACCO NON-USER: CPT | Performed by: ORTHOPAEDIC SURGERY

## 2019-08-08 PROCEDURE — 99283 EMERGENCY DEPT VISIT LOW MDM: CPT

## 2019-08-08 RX ORDER — IBUPROFEN 800 MG/1
800 TABLET ORAL EVERY 8 HOURS PRN
Qty: 30 TABLET | Refills: 0 | Status: ON HOLD | OUTPATIENT
Start: 2019-08-08 | End: 2022-01-07

## 2019-08-08 RX ORDER — OXYCODONE HYDROCHLORIDE AND ACETAMINOPHEN 5; 325 MG/1; MG/1
1 TABLET ORAL EVERY 8 HOURS PRN
Qty: 20 TABLET | Refills: 0 | Status: SHIPPED | OUTPATIENT
Start: 2019-08-08 | End: 2019-08-13

## 2019-08-08 RX ORDER — KETOROLAC TROMETHAMINE 30 MG/ML
60 INJECTION, SOLUTION INTRAMUSCULAR; INTRAVENOUS ONCE
Status: COMPLETED | OUTPATIENT
Start: 2019-08-08 | End: 2019-08-08

## 2019-08-08 RX ADMIN — KETOROLAC TROMETHAMINE 60 MG: 30 INJECTION, SOLUTION INTRAMUSCULAR at 01:42

## 2019-08-08 ASSESSMENT — PAIN DESCRIPTION - DESCRIPTORS: DESCRIPTORS: CONSTANT;DISCOMFORT;THROBBING

## 2019-08-08 ASSESSMENT — ENCOUNTER SYMPTOMS
CHEST TIGHTNESS: 0
COUGH: 0
APNEA: 0
ABDOMINAL PAIN: 0
VOMITING: 0

## 2019-08-08 ASSESSMENT — PAIN DESCRIPTION - LOCATION: LOCATION: HAND;WRIST

## 2019-08-08 ASSESSMENT — PAIN SCALES - GENERAL
PAINLEVEL_OUTOF10: 5
PAINLEVEL_OUTOF10: 5

## 2019-08-08 ASSESSMENT — PAIN DESCRIPTION - PAIN TYPE: TYPE: ACUTE PAIN

## 2019-08-08 ASSESSMENT — PAIN DESCRIPTION - ORIENTATION: ORIENTATION: RIGHT

## 2019-08-08 NOTE — ED NOTES
Pt did not want Percocet script. Script placed in shred box.       Abby Washington RN  08/08/19 0627

## 2019-08-08 NOTE — PROGRESS NOTES
concerns. Return in about 5 weeks (around 9/12/2019) for Xrays out of cast .    No orders of the defined types were placed in this encounter. Orders Placed This Encounter   Procedures    RI APPLY FOREARM CAST    CAST SHORT ARM     Juan Jose Brewer LPN am scribing for and in the presence of Dr. Linda Betancur  8/9/2019 11:32 AM      I have reviewed and made changes accordingly to the work scribed by Billy Ritchie LPN. The documentation accurately reflects work and decisions made by me. I have also reviewed documentation completed by clinical staff.     Linda Betancur DO, [facility]  8/9/2019 11:32 AM    This note is created with the assistance of a speech recognition program.  While intending to generate a document that actually reflects the content of the visit, the document can still have some errors including those of syntax and sound a like substitutions which may escape proof reading.  In such instances, actual meaning can be extrapolated by contextual diversion      Electronically signed by Fransico Reed DO, FAOAO on 8/9/2019 at 11:32 AM

## 2019-08-29 ENCOUNTER — NURSE ONLY (OUTPATIENT)
Dept: ORTHOPEDIC SURGERY | Age: 44
End: 2019-08-29

## 2019-08-29 ENCOUNTER — TELEPHONE (OUTPATIENT)
Dept: ORTHOPEDIC SURGERY | Age: 44
End: 2019-08-29

## 2019-08-29 DIAGNOSIS — S52.501A CLOSED FRACTURE OF DISTAL END OF RIGHT RADIUS, UNSPECIFIED FRACTURE MORPHOLOGY, INITIAL ENCOUNTER: Primary | ICD-10-CM

## 2019-08-29 NOTE — TELEPHONE ENCOUNTER
Received a message from our [facility] that the patient called stating that she got her cast wet. She stated the cast was falling apart and she already cut part of the cast off. Patient is also c/o serve pain. Patient got disconnected. I tried to call the patient back, she did not answer and her VM is not set up. If patient calls back, she is to come into the office today.

## 2019-09-12 DIAGNOSIS — S52.501A CLOSED FRACTURE OF DISTAL END OF RIGHT RADIUS, UNSPECIFIED FRACTURE MORPHOLOGY, INITIAL ENCOUNTER: Primary | ICD-10-CM

## 2019-09-16 ENCOUNTER — OFFICE VISIT (OUTPATIENT)
Dept: ORTHOPEDIC SURGERY | Age: 44
End: 2019-09-16
Payer: COMMERCIAL

## 2019-09-16 VITALS — BODY MASS INDEX: 27.33 KG/M2 | WEIGHT: 160.05 LBS | HEIGHT: 64 IN

## 2019-09-16 DIAGNOSIS — S52.501D CLOSED FRACTURE OF DISTAL END OF RIGHT RADIUS WITH ROUTINE HEALING, UNSPECIFIED FRACTURE MORPHOLOGY, SUBSEQUENT ENCOUNTER: Primary | ICD-10-CM

## 2019-09-16 PROCEDURE — 1036F TOBACCO NON-USER: CPT | Performed by: ORTHOPAEDIC SURGERY

## 2019-09-16 PROCEDURE — G8428 CUR MEDS NOT DOCUMENT: HCPCS | Performed by: ORTHOPAEDIC SURGERY

## 2019-09-16 PROCEDURE — G8419 CALC BMI OUT NRM PARAM NOF/U: HCPCS | Performed by: ORTHOPAEDIC SURGERY

## 2019-09-16 PROCEDURE — 99213 OFFICE O/P EST LOW 20 MIN: CPT | Performed by: ORTHOPAEDIC SURGERY

## 2019-09-16 ASSESSMENT — ENCOUNTER SYMPTOMS
NAUSEA: 0
CONSTIPATION: 0
COUGH: 0
DIARRHEA: 0

## 2019-09-25 ENCOUNTER — HOSPITAL ENCOUNTER (OUTPATIENT)
Age: 44
Discharge: HOME | End: 2019-09-25
Payer: COMMERCIAL

## 2019-09-25 LAB
T3 FREE: 2.07 PG/ML (ref 2.02–4.43)
THYROXINE, FREE: 0.9 NG/DL (ref 0.93–1.7)
TSH SERPL DL<=0.05 MIU/L-ACNC: 6.92 MIU/L (ref 0.3–5)

## 2019-09-25 PROCEDURE — 84443 ASSAY THYROID STIM HORMONE: CPT

## 2019-09-25 PROCEDURE — 84481 FREE ASSAY (FT-3): CPT

## 2019-09-25 PROCEDURE — 84439 ASSAY OF FREE THYROXINE: CPT

## 2019-09-25 PROCEDURE — 36415 COLL VENOUS BLD VENIPUNCTURE: CPT

## 2019-09-26 DIAGNOSIS — S52.501A CLOSED FRACTURE OF DISTAL END OF RIGHT RADIUS, UNSPECIFIED FRACTURE MORPHOLOGY, INITIAL ENCOUNTER: Primary | ICD-10-CM

## 2019-09-30 ENCOUNTER — OFFICE VISIT (OUTPATIENT)
Dept: ORTHOPEDIC SURGERY | Age: 44
End: 2019-09-30
Payer: COMMERCIAL

## 2019-09-30 VITALS — HEIGHT: 64 IN | WEIGHT: 160.05 LBS | BODY MASS INDEX: 27.33 KG/M2

## 2019-09-30 DIAGNOSIS — S52.501D CLOSED FRACTURE OF DISTAL END OF RIGHT RADIUS WITH ROUTINE HEALING, UNSPECIFIED FRACTURE MORPHOLOGY, SUBSEQUENT ENCOUNTER: Primary | ICD-10-CM

## 2019-09-30 PROCEDURE — G8427 DOCREV CUR MEDS BY ELIG CLIN: HCPCS | Performed by: ORTHOPAEDIC SURGERY

## 2019-09-30 PROCEDURE — G8419 CALC BMI OUT NRM PARAM NOF/U: HCPCS | Performed by: ORTHOPAEDIC SURGERY

## 2019-09-30 PROCEDURE — 1036F TOBACCO NON-USER: CPT | Performed by: ORTHOPAEDIC SURGERY

## 2019-09-30 PROCEDURE — 99213 OFFICE O/P EST LOW 20 MIN: CPT | Performed by: ORTHOPAEDIC SURGERY

## 2019-09-30 ASSESSMENT — ENCOUNTER SYMPTOMS
COUGH: 0
NAUSEA: 0
CONSTIPATION: 0
DIARRHEA: 0

## 2020-11-10 ENCOUNTER — OFFICE VISIT (OUTPATIENT)
Dept: FAMILY MEDICINE CLINIC | Age: 45
End: 2020-11-10
Payer: COMMERCIAL

## 2020-11-10 ENCOUNTER — HOSPITAL ENCOUNTER (OUTPATIENT)
Age: 45
Setting detail: SPECIMEN
Discharge: HOME | End: 2020-11-10
Payer: COMMERCIAL

## 2020-11-10 VITALS — BODY MASS INDEX: 27.31 KG/M2 | HEIGHT: 64 IN | WEIGHT: 160 LBS

## 2020-11-10 LAB — S PYO AG THROAT QL: NORMAL

## 2020-11-10 PROCEDURE — 87880 STREP A ASSAY W/OPTIC: CPT | Performed by: PHYSICIAN ASSISTANT

## 2020-11-10 PROCEDURE — G8419 CALC BMI OUT NRM PARAM NOF/U: HCPCS | Performed by: PHYSICIAN ASSISTANT

## 2020-11-10 PROCEDURE — 1036F TOBACCO NON-USER: CPT | Performed by: PHYSICIAN ASSISTANT

## 2020-11-10 PROCEDURE — G8427 DOCREV CUR MEDS BY ELIG CLIN: HCPCS | Performed by: PHYSICIAN ASSISTANT

## 2020-11-10 PROCEDURE — 99213 OFFICE O/P EST LOW 20 MIN: CPT | Performed by: PHYSICIAN ASSISTANT

## 2020-11-10 PROCEDURE — G8484 FLU IMMUNIZE NO ADMIN: HCPCS | Performed by: PHYSICIAN ASSISTANT

## 2020-11-10 ASSESSMENT — PATIENT HEALTH QUESTIONNAIRE - PHQ9
2. FEELING DOWN, DEPRESSED OR HOPELESS: 0
1. LITTLE INTEREST OR PLEASURE IN DOING THINGS: 0
SUM OF ALL RESPONSES TO PHQ9 QUESTIONS 1 & 2: 0
SUM OF ALL RESPONSES TO PHQ QUESTIONS 1-9: 0

## 2020-11-10 NOTE — LETTER
[address] [place] [address]  Phone: [phone]  Fax: [phone]    Saloni Olsen PA-C        November 10, 2020     Patient: Wilian Pantoja   YOB: 1975   Date of Visit: 11/10/2020       To Whom it May Concern:    Von Dobson was seen in my clinic on 11/10/2020. She is to remain off work until her [place] test comes back negative    If you have any questions or concerns, please don't hesitate to call.     Sincerely,         Saloni Olsen PA-C

## 2020-11-10 NOTE — PATIENT INSTRUCTIONS
Patient Education        Learning About Coronavirus [phone])  Coronavirus [phone]): Overview  What is coronavirus ([id number])? The coronavirus disease (COVID-19) is caused by a virus. It is an illness that was first found in December 2019. It has since spread worldwide. The virus can cause fever, cough, and trouble breathing. In severe cases, it can cause pneumonia and make it hard to breathe without help. It can cause death. This virus spreads person-to-person through droplets from coughing and sneezing. It can also spread when you are close to someone who is infected. And it can spread when you touch something that has the virus on it, such as a doorknob or a tabletop. Coronaviruses are a large group of viruses. They cause the common cold. They also cause more serious illnesses like Middle East respiratory syndrome (MERS) and severe acute respiratory syndrome (SARS). COVID-19 is caused by a novel coronavirus. That means it's a new type that has not been seen in people before. How is COVID-19 treated? Mild illness can be treated at home, but more serious illness needs to be treated in the hospital. Treatment may include medicines to reduce symptoms, plus breathing support such as oxygen therapy or a ventilator. Other treatments, such as antiviral medicines, may help people who have COVID-19. What can you do to protect yourself from COVID-19? The best way to protect yourself from getting sick is to:  · Avoid areas where there is an outbreak. · Avoid contact with people who may be infected. · Avoid crowds and try to stay at least 6 feet away from other people. · Wash your hands often, especially after you cough or sneeze. Use soap and water, and scrub for at least 20 seconds. If soap and water aren't available, use an alcohol-based hand . · Avoid touching your mouth, nose, and eyes. What can you do to avoid spreading the virus to others?   To help avoid spreading the virus to others:  · [employer] your hands often with soap or alcohol-based hand sanitizers. · Cover your mouth with a tissue when you cough or sneeze. Then throw the tissue in the trash. · Use a disinfectant to clean things that you touch often. These include doorknobs, remote controls, phones, and handles on your refrigerator and microwave. And don't forget countertops, tabletops, bathrooms, and computer keyboards. · Wear a cloth face cover if you have to go to public areas. If you know or suspect that you have COVID-19:  · Stay home. Don't go to school, work, or public areas. And don't use public transportation, ride-shares, or taxis unless you have no choice. · Leave your home only if you need to get medical care or testing. But call the doctor's office first so they know you're coming. And wear a face cover. · Limit contact with people in your home. If possible, stay in a separate bedroom and use a separate bathroom. · Wear a face cover whenever you're around other people. It can help stop the spread of the virus when you cough or sneeze. · Clean and disinfect your home every day. Use household  and disinfectant wipes or sprays. Take special care to clean things that you grab with your hands. · Self-isolate until it's safe to be around others again. ? If you have symptoms, it's safe when you haven't had a fever for 3 days and your symptoms have improved and it's been at least 10 days since your symptoms started. ? If you were exposed to the virus but don't have symptoms, it's safe to be around others 14 days after exposure. ? Talk to your doctor about whether you also need testing, especially if you have a weakened immune system. When to call for help  Call 911 anytime you think you may need emergency care. For example, call if:  · You have severe trouble breathing. (You can't talk at all.)  · You have constant chest pain or pressure. · You are severely dizzy or lightheaded.   · You are confused or can't think clearly. · Your face and lips have a blue color. · You passed out (lost consciousness) or are very hard to wake up. Call your doctor now if you develop symptoms such as:  · Shortness of breath. · Fever. · Cough. If you need to get care, call ahead to the doctor's office for instructions before you go. Make sure you wear a face cover to prevent exposing other people to the virus. Where can you get the latest information? The following health organizations are tracking and studying this virus. Their websites contain the most up-to-date information. [place] also learn what to do if you think you may have been exposed to the virus. · U.S. Centers for Disease Control and Prevention (CDC): The CDC provides updated news about the disease and travel advice. The website also tells you how to prevent the spread of infection. www.cdc.gov  · World Health Organization [facility]): WHO offers information about the virus outbreaks. WHO also has travel advice. www.who.int  Current as of: July 10, 2020               Content Version: 12.6  © 2006-2020 [employer]. Care instructions adapted under license by [facility] ([facility]). If you have questions about a medical condition or this instruction, always ask your healthcare professional. [place] [address] any warranty or liability for your use of this information. Patient Education        Coronavirus ([id number]-43): Care Instructions  Overview  The coronavirus disease (COVID-19) is caused by a virus. Symptoms may include a fever, a cough, and shortness of breath. It mainly spreads person-to-person through droplets from coughing and sneezing. The virus also can spread when people are in close contact with someone who is infected. Most people have mild symptoms and can take care of themselves at home.  If their symptoms get worse, they may need care in a hospital. Treatment may include medicines to reduce symptoms, plus breathing support such as oxygen therapy or a ventilator. It's important to not spread the virus to others. If you have COVID-19, wear a face cover anytime you are around other people. You need to isolate yourself while you are sick. Leave your home only if you need to get medical care or testing. Follow-up care is a key part of your treatment and safety. Be sure to make and go to all appointments, and call your doctor if you are having problems. It's also a good idea to know your test results and keep a list of the medicines you take. How can you care for yourself at home? · Get extra rest. It can help you feel better. · Drink plenty of fluids. This helps replace fluids lost from fever. Fluids also help ease a scratchy throat. Water, soup, fruit juice, and hot tea with lemon are good choices. · Take acetaminophen (such as Tylenol) to reduce a fever. It may also help with muscle aches. Read and follow all instructions on the label. · Use petroleum jelly on sore skin. This can help if the skin around your nose and lips becomes sore from rubbing a lot with tissues. Tips for self-isolation  · Limit contact with people in your home. If possible, stay in a separate bedroom and use a separate bathroom. · Wear a cloth face cover when you are around other people. It can help stop the spread of the virus when you cough or sneeze. · If you have to leave home, avoid crowds and try to stay at least 6 feet away from other people. · Avoid contact with pets and other animals. · Cover your mouth and nose with a tissue when you cough or sneeze. Then throw it in the trash right away. · Wash your hands often, especially after you cough or sneeze. Use soap and water, and scrub for at least 20 seconds. If soap and water aren't available, use an alcohol-based hand . · Don't share personal household items. These include bedding, towels, cups and glasses, and eating utensils.   · [employer] laundry in the warmest water allowed for the fabric type, and dry Care instructions adapted under license by [facility] ([facility]). If you have questions about a medical condition or this instruction, always ask your healthcare professional. [place] [address] any warranty or liability for your use of this information. Patient Education        Cough: Care Instructions  Your Care Instructions     A cough is your body's response to something that bothers your throat or airways. Many things can cause a cough. You might cough because of a cold or the flu, bronchitis, or asthma. Smoking, postnasal drip, allergies, and stomach acid that backs up into your throat also can cause coughs. A cough is a symptom, not a disease. Most coughs stop when the cause, such as a cold, goes away. You can take a few steps at home to cough less and feel better. Follow-up care is a key part of your treatment and safety. Be sure to make and go to all appointments, and call your doctor if you are having problems. It's also a good idea to know your test results and keep a list of the medicines you take. How can you care for yourself at home? · Drink lots of water and other fluids. This helps thin the mucus and soothes a dry or sore throat. Honey or lemon juice in hot water or tea may ease a dry cough. · Take cough medicine as directed by your doctor. · Prop up your head on pillows to help you breathe and ease a dry cough. · Try cough drops to soothe a dry or sore throat. Cough drops don't stop a cough. Medicine-flavored cough drops are no better than candy-flavored drops or hard candy. · Do not smoke. Avoid secondhand smoke. If you need help quitting, talk to your doctor about stop-smoking programs and medicines. These can increase your chances of quitting for good. When should you call for help? Call 911 anytime you think you may need emergency care. For example, call if:    · You have severe trouble breathing.    Call your doctor now or seek immediate medical care if:    · You cough up blood.     · You have new or worse trouble breathing.     · You have a new or higher fever.     · You have a new rash. Watch closely for changes in your health, and be sure to contact your doctor if:    · You cough more deeply or more often, especially if you notice more mucus or a change in the color of your mucus.     · You have new symptoms, such as a sore throat, an earache, or sinus pain.     · You do not get better as expected. Where can you learn more? Go to https://FitBarkpeAlkermes.[employer]. org and sign in to your [employer] account. Enter D279 in the Ripl box to learn more about \"Cough: Care Instructions. \"     If you do not have an account, please click on the \"Sign Up Now\" link. Current as of: February 24, 2020               Content Version: 12.6  © 6312-7504 [employer]. Care instructions adapted under license by [facility] ([facility]). If you have questions about a medical condition or this instruction, always ask your healthcare professional. [place] [address] any warranty or liability for your use of this information. Patient Education        Sore Throat: Care Instructions  Your Care Instructions     Infection by bacteria or a virus causes most sore throats. Cigarette smoke, dry air, air pollution, allergies, and yelling can also cause a sore throat. Sore throats can be painful and annoying. Fortunately, most sore throats go away on their own. If you have a bacterial infection, your doctor may prescribe antibiotics. Follow-up care is a key part of your treatment and safety. Be sure to make and go to all appointments, and call your doctor if you are having problems. It's also a good idea to know your test results and keep a list of the medicines you take. How can you care for yourself at home? · If your doctor prescribed antibiotics, take them as directed. Do not stop taking them just because you feel better.  You need to take the full course of antibiotics. · Gargle with warm salt water once an hour to help reduce swelling and relieve discomfort. Use 1 teaspoon of salt mixed in 1 cup of warm water. · Take an over-the-counter pain medicine, such as acetaminophen (Tylenol), ibuprofen (Advil, Motrin), or naproxen (Aleve). Read and follow all instructions on the label. · Be careful when taking over-the-counter cold or flu medicines and Tylenol at the same time. Many of these medicines have acetaminophen, which is Tylenol. Read the labels to make sure that you are not taking more than the recommended dose. Too much acetaminophen (Tylenol) can be harmful. · Drink plenty of fluids. Fluids may help soothe an irritated throat. Hot fluids, such as tea or soup, may help decrease throat pain. · Use over-the-counter throat lozenges to soothe pain. Regular cough drops or hard candy may also help. These should not be given to young children because of the risk of choking. · Do not smoke or allow others to smoke around you. If you need help quitting, talk to your doctor about stop-smoking programs and medicines. These can increase your chances of quitting for good. · Use a vaporizer or humidifier to add moisture to your bedroom. Follow the directions for cleaning the machine. When should you call for help? Call your doctor now or seek immediate medical care if:    · You have new or worse trouble swallowing.     · Your sore throat gets much worse on one side. Watch closely for changes in your health, and be sure to contact your doctor if you do not get better as expected. Where can you learn more? Go to https://IDRI (Infectious Disease Research Institute)gus.[employer]. org and sign in to your [employer] account. Enter Y027 in the Onit box to learn more about \"Sore Throat: Care Instructions. \"     If you do not have an account, please click on the \"Sign Up Now\" link. Current as of: April 15, 2020               Content Version: 12.6  © 7731-9971 [employer]. Care instructions adapted under license by [facility] ([facility]). If you have questions about a medical condition or this instruction, always ask your healthcare professional. [address] any warranty or liability for your use of this information.

## 2020-11-10 NOTE — PROGRESS NOTES
andnegative. PAST MEDICAL HISTORY   History reviewed. Past Medical History:   Diagnosis Date    Heart palpitations     Hypothyroidism 2008    s/p radioiodine ablation          SURGICAL HISTORY     History reviewed. Past Surgical History:   Procedure Laterality Date    BREAST ENHANCEMENT SURGERY      x3     SECTION      failure to progress    DILATION AND CURETTAGE OF UTERUS      s/p SAB x 3    HAND SURGERY Right     [facility]    UMBILICAL HERNIA REPAIR N/A 3/3/5076    HERNIA UMBILICAL REPAIR W/MESH performed by Bhupinder Contreras MD at [address] [facility]       Current Outpatient Medications   Medication Sig Dispense Refill    Multiple Vitamins-Minerals (THERAPEUTIC MULTIVITAMIN-MINERALS) tablet Take 1 tablet by mouth daily      levothyroxine (SYNTHROID) 200 MCG tablet Take 0.5 tablets by mouth Daily. (Patient taking differently:   Take 200 mcg by mouth Daily ) 30 tablet 3    ibuprofen (ADVIL;MOTRIN) 800 MG tablet Take 1 tablet by mouth every 8 hours as needed for Pain (Patient not taking: Reported on 2019) 30 tablet 0    cephALEXin (KEFLEX) 500 MG capsule Take 500 mg by mouth 3 times daily      sulfamethoxazole-trimethoprim (BACTRIM DS;SEPTRA DS) 800-160 MG per tablet Take 1 tablet by mouth 2 times daily      ibuprofen (ADVIL;MOTRIN) 800 MG tablet Take 1 tablet by mouth every 8 hours as needed for Pain or Fever 30 tablet 1     No current facility-administered medications for this visit. ALLERGIES     Bee venom and Percocet [oxycodone-acetaminophen]    FAMILY HISTORY           Problem Relation Age of Onset    Diabetes Other      Family Status   Relation Name Status    Other  (Not Specified)          SOCIAL HISTORY      reports that she has quit smoking. Her smoking use included cigarettes. She has never used smokeless tobacco. She reports that she does not drink alcohol or use drugs.       PHYSICAL EXAM    (up to 7 for level 4, 8 or more for level 5) Admitted to ICU for COVID-19? Answer:   No     Order Specific Question:   Employed in healthcare setting? Answer:   Unknown     Order Specific Question:   Resident in a congregate (group) care setting? Answer:   Unknown     Order Specific Question:   Pregnant? Answer:   Unknown     Order Specific Question:   Previously tested for COVID-19? Answer:   Unknown    POCT rapid strep A       Results for orders placed or performed in visit on 11/10/20   POCT rapid strep A   Result Value Ref Range    Strep A Ag None Detected None Detected       FINALIMPRESSION      Visit Diagnoses and Associated Orders     Cough    -  Primary    COVID-19 Ambulatory [[id number] Custom]   - Future Order         Sore throat        COVID-19 Ambulatory [[id number] Custom]   - Future Order    POCT rapid strep A [[id number] Custom]           Generalized body aches        COVID-19 Ambulatory [[id number] Custom]   - Future Order         Nonintractable headache, unspecified chronicity pattern, unspecified headache type        COVID-19 Ambulatory [[id number] Custom]   - Future Order                 PLAN     Return if symptoms worsen or fail to improve. DISCHARGEMEDICATIONS:  No orders of the defined types were placed in this encounter. Plan:  Specimen sent for a culture. Possible treatment alteration based on the results. Steps to help prevent the spread of COVID-19 if you are sick  SOURCE - https://lynch-[place].info/. html     Stay home except to get medical care   ; Stay home: People who are mildly ill with COVID-19 are able to isolate at home during their illness.  You should restrict activities outside your home, except for getting medical care.   ; Avoid public areas: Do not go to work, school, or public areas.   ; Avoid public transportation: Avoid using public transportation, ride-sharing, or taxis.  ; Separate yourself from other people and animals in your home   ; Stay away from others: As much as possible, you should stay in a specific room and away from other people in your home. Also, you should use a separate bathroom, if available.   ; Limit contact with pets & animals: You should restrict contact with pets and other animals while you are sick with COVID-19, just like you would around other people. Although there have not been reports of pets or other animals becoming sick with COVID-19, it is still recommended that people sick with COVID-19 limit contact with animals until more information is known about the virus. ; When possible, have another member of your household care for your animals while you are sick. If you are sick with COVID-19, avoid contact with your pet, including petting, snuggling, being kissed or licked, and sharing food. If you must care for your pet or be around animals while you are sick, wash your hands before and after you interact with pets and wear a facemask. See COVID-19 and Animals for more information. Other considerations   The ill person should eat/be fed in their room if possible. Non-disposable  items used should be handled with gloves and washed with hot water or in a . Clean hands after handling used  items.  If possible, dedicate a lined trash can for the ill person. Use gloves when removing garbage bags, handling, and disposing of trash. Wash hands after handling or disposing of trash.  Consider consulting with your local health department about trash disposal guidance if available. Information for Household Members and Caregivers of Someone who is Sick   Call ahead before visiting your doctor   Call ahead: If you have a medical appointment, call the healthcare provider and tell them that you have or may have COVID-19. This will help the healthcare provider's office take steps to keep other people from getting infected or exposed. Wear a facemask if you are sick   ;  If you are sick: You should wear a items   ; Do not share: You should not share dishes, drinking glasses, cups, eating utensils, towels, or bedding with other people or pets in your home.   ; Wash thoroughly after use: After using these items, they should be washed thoroughly with soap and water. Clean all high-touch surfaces everyday   ; Clean and disinfect: Practice routine cleaning of high touch surfaces.  ; High touch surfaces include counters, tabletops, doorknobs, bathroom fixtures, toilets, phones, keyboards, tablets, and bedside tables.  ; Disinfect areas with bodily fluids: Also, clean any surfaces that may have blood, stool, or body fluids on them.   ; Household : Use a household cleaning spray or wipe, according to the label instructions. Labels contain instructions for safe and effective use of the cleaning product including precautions you should take when applying the product, such as wearing gloves and making sure you have good ventilation during use of the product. Monitor your symptoms   Seek medical attention: Seek prompt medical attention if your illness is worsening     (e.g., difficulty breathing).   ; Call your doctor: Before seeking care, call your healthcare provider and tell them that you have, or are being evaluated for, COVID-19.   ; Wear a facemask when sick: Put on a facemask before you enter the facility. These steps will help the healthcare provider's office to keep other people in the office or waiting room from getting infected or exposed. ; Alert health department: Ask your healthcare provider to call the local or state health department.  Persons who are placed under active monitoring or facilitated self-monitoring should follow instructions provided by their local health department or occupational health professionals, as appropriate.  ; Call 911 if you have a medical emergency: If you have a medical emergency and need to call 911, notify the dispatch personnel that you have, or are being evaluated for COVID-19. If possible, put on a facemask before emergency medical services arrive. Patient instructed to return to the office if symptoms worsen, return, or have any other concerns. Patient understands and is agreeable.          Margy Aase, PA-C 11/14/2020 5:33 PM

## 2020-11-13 LAB — SARS-COV-2, NAA: NOT DETECTED

## 2020-11-14 ASSESSMENT — ENCOUNTER SYMPTOMS
COUGH: 1
SINUS PRESSURE: 1
VISUAL CHANGE: 0
SORE THROAT: 1
VOMITING: 0
SWOLLEN GLANDS: 1
EYES NEGATIVE: 1
CHANGE IN BOWEL HABIT: 0
ABDOMINAL PAIN: 0
NAUSEA: 0

## 2020-11-29 ENCOUNTER — HOSPITAL ENCOUNTER (EMERGENCY)
Age: 45
Discharge: HOME | End: 2020-11-29
Attending: EMERGENCY MEDICINE
Payer: COMMERCIAL

## 2020-11-29 VITALS
RESPIRATION RATE: 16 BRPM | DIASTOLIC BLOOD PRESSURE: 79 MMHG | HEART RATE: 79 BPM | WEIGHT: 160 LBS | OXYGEN SATURATION: 97 % | SYSTOLIC BLOOD PRESSURE: 144 MMHG | TEMPERATURE: 98.6 F | BODY MASS INDEX: 27.46 KG/M2

## 2020-11-29 LAB
ABSOLUTE EOS #: 0.14 K/UL (ref 0–0.44)
ABSOLUTE IMMATURE GRANULOCYTE: 0.01 K/UL (ref 0–0.3)
ABSOLUTE LYMPH #: 3.5 K/UL (ref 1.1–3.7)
ABSOLUTE MONO #: 0.4 K/UL (ref 0.1–1.2)
ANION GAP SERPL CALCULATED.3IONS-SCNC: 7 MMOL/L (ref 9–17)
BASOPHILS # BLD: 1 % (ref 0–2)
BASOPHILS ABSOLUTE: 0.07 K/UL (ref 0–0.2)
BUN BLDV-MCNC: 18 MG/DL (ref 6–20)
BUN/CREAT BLD: 28 (ref 9–20)
CALCIUM SERPL-MCNC: 8.9 MG/DL (ref 8.6–10.4)
CHLORIDE BLD-SCNC: 104 MMOL/L (ref 98–107)
CHP ED QC CHECK: YES
CO2: 26 MMOL/L (ref 20–31)
CREAT SERPL-MCNC: 0.64 MG/DL (ref 0.5–0.9)
DIFFERENTIAL TYPE: ABNORMAL
EOSINOPHILS RELATIVE PERCENT: 2 % (ref 1–4)
GFR AFRICAN AMERICAN: >60 ML/MIN
GFR NON-AFRICAN AMERICAN: >60 ML/MIN
GFR SERPL CREATININE-BSD FRML MDRD: ABNORMAL ML/MIN/{1.73_M2}
GFR SERPL CREATININE-BSD FRML MDRD: ABNORMAL ML/MIN/{1.73_M2}
GLUCOSE BLD-MCNC: 104 MG/DL (ref 70–99)
HCT VFR BLD CALC: 33.2 % (ref 36.3–47.1)
HEMOGLOBIN: 10.1 G/DL (ref 11.9–15.1)
IMMATURE GRANULOCYTES: 0 %
LYMPHOCYTES # BLD: 50 % (ref 24–43)
MCH RBC QN AUTO: 26.2 PG (ref 25.2–33.5)
MCHC RBC AUTO-ENTMCNC: 30.4 G/DL (ref 28.4–34.8)
MCV RBC AUTO: 86.2 FL (ref 82.6–102.9)
MONOCYTES # BLD: 6 % (ref 3–12)
NRBC AUTOMATED: 0 PER 100 WBC
PDW BLD-RTO: 14.1 % (ref 11.8–14.4)
PLATELET # BLD: 334 K/UL (ref 138–453)
PLATELET ESTIMATE: ABNORMAL
PMV BLD AUTO: 8.8 FL (ref 8.1–13.5)
POTASSIUM SERPL-SCNC: 3.8 MMOL/L (ref 3.7–5.3)
PREGNANCY TEST URINE, POC: NORMAL
RBC # BLD: 3.85 M/UL (ref 3.95–5.11)
RBC # BLD: ABNORMAL 10*6/UL
SEG NEUTROPHILS: 41 % (ref 36–65)
SEGMENTED NEUTROPHILS ABSOLUTE COUNT: 2.84 K/UL (ref 1.5–8.1)
SODIUM BLD-SCNC: 137 MMOL/L (ref 135–144)
WBC # BLD: 7 K/UL (ref 3.5–11.3)
WBC # BLD: ABNORMAL 10*3/UL

## 2020-11-29 PROCEDURE — 81025 URINE PREGNANCY TEST: CPT

## 2020-11-29 PROCEDURE — 99282 EMERGENCY DEPT VISIT SF MDM: CPT

## 2020-11-29 PROCEDURE — 85025 COMPLETE CBC W/AUTO DIFF WBC: CPT

## 2020-11-29 PROCEDURE — 80048 BASIC METABOLIC PNL TOTAL CA: CPT

## 2020-11-30 LAB — HCG, PREGNANCY URINE (POC): NEGATIVE

## 2020-11-30 NOTE — ED PROVIDER NOTES
[facility] ED  [facility]      Pt Name: Kristal Scherer  MRN: [id number]  [place] 1975  Date ofevaluation: 2020  Provider: Alexy Oneill Dr       Chief Complaint   Patient presents with    Vaginal Bleeding     started day and a half ago/ soaking multiple pads/ feels like bleeding could be slowing down         HISTORY OF PRESENT ILLNESS  (Location/Symptom, Timing/Onset, Context/Setting, Quality, Duration, Modifying Factors, Severity.)   Kristal Scherer is a 39 y.o. female who presents to the emergency department with vaginal bleeding. Started a day and a half ago. One of the most with implants quickly yesterday. Bleeding has now slowed down nicely today. Patient states that her mother made her come in. Denies any fevers or chills. No nausea vomiting. No pain at this time. No dizziness. No deaf alleviating aggravating factors. No other complaints. Nursing Notes were reviewed. ALLERGIES     Bee venom and Percocet [oxycodone-acetaminophen]    CURRENT MEDICATIONS       Previous Medications    CEPHALEXIN (KEFLEX) 500 MG CAPSULE    Take 500 mg by mouth 3 times daily    IBUPROFEN (ADVIL;MOTRIN) 800 MG TABLET    Take 1 tablet by mouth every 8 hours as needed for Pain or Fever    IBUPROFEN (ADVIL;MOTRIN) 800 MG TABLET    Take 1 tablet by mouth every 8 hours as needed for Pain    LEVOTHYROXINE (SYNTHROID) 200 MCG TABLET    Take 0.5 tablets by mouth Daily.     MULTIPLE VITAMINS-MINERALS (THERAPEUTIC MULTIVITAMIN-MINERALS) TABLET    Take 1 tablet by mouth daily    SULFAMETHOXAZOLE-TRIMETHOPRIM (BACTRIM DS;SEPTRA DS) 800-160 MG PER TABLET    Take 1 tablet by mouth 2 times daily       PAST MEDICAL HISTORY         Diagnosis Date    Heart palpitations     Hypothyroidism     s/p radioiodine ablation        SURGICAL HISTORY           Procedure Laterality Date    BREAST ENHANCEMENT SURGERY      x3     SECTION      failure to cardiologist.        RADIOLOGY:   Non-plain film images such as CT, Ultrasound and MRI are read by the radiologist. Plain radiographic images arevisualized and preliminarily interpreted by the emergency physician with the below findings:        Interpretation per the Radiologist below, if available at thetime of this note:          ED BEDSIDE ULTRASOUND:   Performed by ED Physician - none    LABS:  Labs Reviewed   CBC WITH AUTO DIFFERENTIAL - Abnormal; Notable for the following components:       Result Value    RBC 3.85 (*)     Hemoglobin 10.1 (*)     Hematocrit 33.2 (*)     Lymphocytes 50 (*)     All other components within normal limits   BASIC METABOLIC PANEL - Abnormal; Notable for the following components:    Glucose 104 (*)     Bun/Cre Ratio 28 (*)     Anion Gap 7 (*)     All other components within normal limits   POCT URINE PREGNANCY - Normal       All other labs were within normal range or not returned as of this dictation. EMERGENCY DEPARTMENT COURSE and DIFFERENTIAL DIAGNOSIS/MDM:   Vitals:    Vitals:    11/29/20 2142 11/29/20 2144   BP: (!) 144/79    Pulse: 79    Resp: 16    Temp: 98.6 °F (37 °C)    TempSrc: Oral    SpO2: 97%    Weight:  160 lb (72.6 kg)     Is hemodynamically stable. Refer to OB/GYN and will be discharged home. Urine pregnant test negative. CONSULTS:  None    PROCEDURES:  Procedures        FINAL IMPRESSION      1. DUB (dysfunctional uterine bleeding)    2.  Elevated blood pressure reading          DISPOSITION/PLAN   DISPOSITION Decision To Discharge 11/29/2020 11:20:09 PM      PATIENTREFERRED TO:   Mary Parker MD  [facility] [address] [facility] [address] [address]  [phone]    In 3 days      [address] [employer] [facility] [phone]    In 3 days        DISCHARGE MEDICATIONS:     New Prescriptions    No medications on file           (Please note that portions of this note were completed with a voice recognition program.  Efforts were made to edit thedictations but occasionally words are mis-transcribed.)    ALLIE Monique PA-C  11/29/20 [id number]

## 2021-01-02 ENCOUNTER — APPOINTMENT (OUTPATIENT)
Dept: GENERAL RADIOLOGY | Age: 46
End: 2021-01-02
Payer: COMMERCIAL

## 2021-01-02 ENCOUNTER — HOSPITAL ENCOUNTER (EMERGENCY)
Age: 46
Discharge: HOME | End: 2021-01-02
Attending: EMERGENCY MEDICINE
Payer: COMMERCIAL

## 2021-01-02 VITALS
DIASTOLIC BLOOD PRESSURE: 90 MMHG | BODY MASS INDEX: 27.31 KG/M2 | WEIGHT: 160 LBS | TEMPERATURE: 97.2 F | RESPIRATION RATE: 17 BRPM | HEART RATE: 73 BPM | SYSTOLIC BLOOD PRESSURE: 124 MMHG | HEIGHT: 64 IN | OXYGEN SATURATION: 97 %

## 2021-01-02 DIAGNOSIS — R07.9 CHEST PAIN, UNSPECIFIED TYPE: Primary | ICD-10-CM

## 2021-01-02 LAB
ABSOLUTE EOS #: 0.26 K/UL (ref 0–0.44)
ABSOLUTE IMMATURE GRANULOCYTE: <0.03 K/UL (ref 0–0.3)
ABSOLUTE LYMPH #: 3.63 K/UL (ref 1.1–3.7)
ABSOLUTE MONO #: 0.47 K/UL (ref 0.1–1.2)
ALBUMIN SERPL-MCNC: 3.7 G/DL (ref 3.5–5.2)
ALBUMIN/GLOBULIN RATIO: 1.5 (ref 1–2.5)
ALP BLD-CCNC: 48 U/L (ref 35–104)
ALT SERPL-CCNC: 9 U/L (ref 5–33)
ANION GAP SERPL CALCULATED.3IONS-SCNC: 12 MMOL/L (ref 9–17)
AST SERPL-CCNC: 18 U/L
BASOPHILS # BLD: 1 % (ref 0–2)
BASOPHILS ABSOLUTE: 0.07 K/UL (ref 0–0.2)
BILIRUB SERPL-MCNC: <0.1 MG/DL (ref 0.3–1.2)
BUN BLDV-MCNC: 14 MG/DL (ref 6–20)
BUN/CREAT BLD: ABNORMAL (ref 9–20)
CALCIUM SERPL-MCNC: 8.3 MG/DL (ref 8.6–10.4)
CHLORIDE BLD-SCNC: 104 MMOL/L (ref 98–107)
CO2: 21 MMOL/L (ref 20–31)
CREAT SERPL-MCNC: 0.51 MG/DL (ref 0.5–0.9)
DIFFERENTIAL TYPE: ABNORMAL
EOSINOPHILS RELATIVE PERCENT: 4 % (ref 1–4)
GFR AFRICAN AMERICAN: >60 ML/MIN
GFR NON-AFRICAN AMERICAN: >60 ML/MIN
GFR SERPL CREATININE-BSD FRML MDRD: ABNORMAL ML/MIN/{1.73_M2}
GFR SERPL CREATININE-BSD FRML MDRD: ABNORMAL ML/MIN/{1.73_M2}
GLUCOSE BLD-MCNC: 108 MG/DL (ref 70–99)
HCG QUALITATIVE: NEGATIVE
HCT VFR BLD CALC: 32.1 % (ref 36.3–47.1)
HEMOGLOBIN: 9.9 G/DL (ref 11.9–15.1)
IMMATURE GRANULOCYTES: 0 %
LYMPHOCYTES # BLD: 53 % (ref 24–43)
MCH RBC QN AUTO: 26 PG (ref 25.2–33.5)
MCHC RBC AUTO-ENTMCNC: 30.8 G/DL (ref 28.4–34.8)
MCV RBC AUTO: 84.3 FL (ref 82.6–102.9)
MONOCYTES # BLD: 7 % (ref 3–12)
NRBC AUTOMATED: 0 PER 100 WBC
PDW BLD-RTO: 14.2 % (ref 11.8–14.4)
PLATELET # BLD: 358 K/UL (ref 138–453)
PLATELET ESTIMATE: ABNORMAL
PMV BLD AUTO: 8.7 FL (ref 8.1–13.5)
POTASSIUM SERPL-SCNC: 3.8 MMOL/L (ref 3.7–5.3)
RBC # BLD: 3.81 M/UL (ref 3.95–5.11)
RBC # BLD: ABNORMAL 10*6/UL
SEG NEUTROPHILS: 35 % (ref 36–65)
SEGMENTED NEUTROPHILS ABSOLUTE COUNT: 2.38 K/UL (ref 1.5–8.1)
SODIUM BLD-SCNC: 137 MMOL/L (ref 135–144)
TOTAL PROTEIN: 6.2 G/DL (ref 6.4–8.3)
TROPONIN INTERP: NORMAL
TROPONIN INTERP: NORMAL
TROPONIN T: NORMAL NG/ML
TROPONIN T: NORMAL NG/ML
TROPONIN, HIGH SENSITIVITY: <6 NG/L (ref 0–14)
TROPONIN, HIGH SENSITIVITY: <6 NG/L (ref 0–14)
WBC # BLD: 6.8 K/UL (ref 3.5–11.3)
WBC # BLD: ABNORMAL 10*3/UL

## 2021-01-02 PROCEDURE — 80053 COMPREHEN METABOLIC PANEL: CPT

## 2021-01-02 PROCEDURE — 6370000000 HC RX 637 (ALT 250 FOR IP): Performed by: STUDENT IN AN ORGANIZED HEALTH CARE EDUCATION/TRAINING PROGRAM

## 2021-01-02 PROCEDURE — 99285 EMERGENCY DEPT VISIT HI MDM: CPT

## 2021-01-02 PROCEDURE — 93005 ELECTROCARDIOGRAM TRACING: CPT | Performed by: STUDENT IN AN ORGANIZED HEALTH CARE EDUCATION/TRAINING PROGRAM

## 2021-01-02 PROCEDURE — 84703 CHORIONIC GONADOTROPIN ASSAY: CPT

## 2021-01-02 PROCEDURE — 84484 ASSAY OF TROPONIN QUANT: CPT

## 2021-01-02 PROCEDURE — 85025 COMPLETE CBC W/AUTO DIFF WBC: CPT

## 2021-01-02 PROCEDURE — 71045 X-RAY EXAM CHEST 1 VIEW: CPT

## 2021-01-02 RX ORDER — MAGNESIUM HYDROXIDE/ALUMINUM HYDROXICE/SIMETHICONE 120; 1200; 1200 MG/30ML; MG/30ML; MG/30ML
30 SUSPENSION ORAL ONCE
Status: COMPLETED | OUTPATIENT
Start: 2021-01-02 | End: 2021-01-02

## 2021-01-02 RX ORDER — ASPIRIN 81 MG/1
324 TABLET, CHEWABLE ORAL ONCE
Status: COMPLETED | OUTPATIENT
Start: 2021-01-02 | End: 2021-01-02

## 2021-01-02 RX ORDER — PANTOPRAZOLE SODIUM 20 MG/1
40 TABLET, DELAYED RELEASE ORAL DAILY
Qty: 30 TABLET | Refills: 0 | Status: ON HOLD | OUTPATIENT
Start: 2021-01-02 | End: 2022-01-07

## 2021-01-02 RX ADMIN — ASPIRIN 324 MG: 81 TABLET ORAL at 01:54

## 2021-01-02 RX ADMIN — ALUMINUM HYDROXIDE, MAGNESIUM HYDROXIDE, AND SIMETHICONE 30 ML: 200; 200; 20 SUSPENSION ORAL at 02:53

## 2021-01-02 ASSESSMENT — PAIN DESCRIPTION - LOCATION: LOCATION: CHEST

## 2021-01-02 ASSESSMENT — PAIN DESCRIPTION - PAIN TYPE: TYPE: ACUTE PAIN

## 2021-01-02 ASSESSMENT — ENCOUNTER SYMPTOMS
WHEEZING: 0
TROUBLE SWALLOWING: 0
SHORTNESS OF BREATH: 0
NAUSEA: 1

## 2021-01-02 ASSESSMENT — PAIN SCALES - GENERAL: PAINLEVEL_OUTOF10: 3

## 2021-01-02 ASSESSMENT — PAIN DESCRIPTION - DESCRIPTORS: DESCRIPTORS: DISCOMFORT

## 2021-01-02 ASSESSMENT — PAIN DESCRIPTION - ORIENTATION: ORIENTATION: MID

## 2021-01-02 NOTE — ED PROVIDER NOTES
[facility]     Emergency Department     Faculty Attestation    I performed a history and physical examination of the patient and discussed management with the resident. I have reviewed and agree with the residents findings including all diagnostic interpretations, and treatment plans as written. Any areas of disagreement are noted on the chart. I was personally present for the key portions of any procedures. I have documented in the chart those procedures where I was not present during the key portions. I have reviewed the emergency nurses triage note. I agree with the chief complaint, past medical history, past surgical history, allergies, medications, social and family history as documented unless otherwise noted below. Documentation of the HPI, Physical Exam and Medical Decision Making performed by ana is based on my personal performance of the HPI, PE and MDM. For Physician Assistant/ Nurse Practitioner cases/documentation I have personally evaluated this patient and have completed at least one if not all key elements of the E/M (history, physical exam, and MDM). Additional findings are as noted. Patient with complaint of hand tingling that has been ongoing for the past few weeks. She feels it more in her dominant right hand. She feels it in her small as well as ring finger states initially she would wake up with symptoms that would then resolve. And then has started getting it during the day. She does report having repetitive movements at work for 12 hours a day. She has mild symptoms on the left side none at this time. She denies any neck pain or neck trauma. No weakness has been noted. Patient also presents today with midsternal heaviness that she states developed after eating spicy food. She felt as if she cannot catch her breath. But denies any cough or congestion no leg swelling.   The patient has no prior history of venous thromboembolism. No recent travel. No recent surgery. No leg swelling. No hemoptysis. No estrogen containing medications. No history of malignancy. On exam patient is nontender to palpation, and the chest wall, abdomen is soft nondistended nontender palpation no swelling to the lower extremities. We will plan on EKG, chest x-ray, labs. EKG Interpretation    Interpreted by me  EKG shows normal sinus rhythm no ST elevations or depressions noted, normal axis.       Pari Brown D.O, M.P.H  Attending Emergency Medicine Physician        Pair Brown DO  01/02/21 3074

## 2021-01-02 NOTE — ED NOTES
Dr Arnulfo Faria at bedside, d/c plans discussed with pt     [facility], [address] [place] [address]  01/02/21 0834

## 2021-01-02 NOTE — ED TRIAGE NOTES
Pt c/o intermittent tingling to right arm for months, states mid chest discomfort x 24 hrs, states headaches for months, denies shortness of breath, triage completed, Dr Castillo Stage at bedside

## 2021-01-02 NOTE — ED PROVIDER NOTES
[facility] ED  Emergency Department Encounter  EmergencyMedicine Resident     Pt Nisreen Morales  MRN: [id number]  [place] 1975  Date of evaluation: 21  PCP:  Stephanie Menjivar MD    [address] [place] [address]       Chief Complaint   Patient presents with    Chest Pain    Numbness       HISTORY OF PRESENT ILLNESS  (Location/Symptom, Timing/Onset, Context/Setting, Quality, Duration, Modifying Factors, Severity.)      Gentry Timmons is a 39 y.o. female who presents with tendon paresthesias, tingling of bilateral hands and arms, right worse than left ongoing for past 2 weeks. Patient also reporting that she has had midsternal chest pain ongoing for past 24 hours which is waxing and waning but feels like a pressure-like sensation. Denies any associated shortness of breath, abdominal pain, nausea, vomiting, diarrhea, fever or chills. PAST MEDICAL / SURGICAL / SOCIAL / FAMILY HISTORY      has a past medical history of Heart palpitations and Hypothyroidism. has a past surgical history that includes  section; Dilation and curettage of uterus; Breast enhancement surgery; Hand surgery (Right); and Umbilical hernia repair (N/A, 3/7/2019).       Social History     Socioeconomic History    Marital status: Single     Spouse name: Not on file    Number of children: Not on file    Years of education: Not on file    Highest education level: Not on file   Occupational History    Not on file   Social Needs    Financial resource strain: Not on file    Food insecurity     Worry: Not on file     Inability: Not on file    Transportation needs     Medical: Not on file     Non-medical: Not on file   Tobacco Use    Smoking status: Former Smoker     Types: Cigarettes    Smokeless tobacco: Never Used    Tobacco comment: ''Lots of 2nd hand smoke''   Substance and Sexual Activity    Alcohol use: No    Drug use: No    Sexual activity: Yes     Partners: Male   Lifestyle    Physical activity Days per week: Not on file     Minutes per session: Not on file    Stress: Not on file   Relationships    Social connections     Talks on phone: Not on file     Gets together: Not on file     Attends Zoroastrian service: Not on file     Active member of club or organization: Not on file     Attends meetings of clubs or organizations: Not on file     Relationship status: Not on file    Intimate partner violence     Fear of current or ex partner: Not on file     Emotionally abused: Not on file     Physically abused: Not on file     Forced sexual activity: Not on file   Other Topics Concern    Not on file   Social History Narrative    Not on file       Family History   Problem Relation Age of Onset    Diabetes Other        Allergies:  Bee venom and Percocet [oxycodone-acetaminophen]    Home Medications:  Prior to Admission medications    Medication Sig Start Date End Date Taking? Authorizing Provider   pantoprazole (PROTONIX) 20 MG tablet Take 2 tablets by mouth daily 1/2/21  Yes Holden Tijerina,    ibuprofen (ADVIL;MOTRIN) 800 MG tablet Take 1 tablet by mouth every 8 hours as needed for Pain  Patient not taking: Reported on 9/30/2019 8/8/19   Sheryl Osgood, MD   cephALEXin (KEFLEX) 500 MG capsule Take 500 mg by mouth 3 times daily 3/31/19   Historical Provider, MD   sulfamethoxazole-trimethoprim (BACTRIM DS;SEPTRA DS) 800-160 MG per tablet Take 1 tablet by mouth 2 times daily 3/31/19   Historical Provider, MD   Multiple Vitamins-Minerals (THERAPEUTIC MULTIVITAMIN-MINERALS) tablet Take 1 tablet by mouth daily    Historical Provider, MD   ibuprofen (ADVIL;MOTRIN) 800 MG tablet Take 1 tablet by mouth every 8 hours as needed for Pain or Fever 3/7/19 3/17/19  Aimee Ruiz MD   levothyroxine (SYNTHROID) 200 MCG tablet Take 0.5 tablets by mouth Daily.   Patient taking differently:   Take 200 mcg by mouth Daily  7/26/14   See-Yin So, DO       REVIEW OF SYSTEMS    (2-9 systems for level 4, 10 or more for level 5)      Review of Systems   Constitutional: Negative for fatigue and fever. HENT: Negative for trouble swallowing. Eyes: Negative for visual disturbance. Respiratory: Negative for shortness of breath and wheezing. Cardiovascular: Positive for chest pain. Gastrointestinal: Positive for nausea. Endocrine: Negative for polyuria. Genitourinary: Negative for dysuria and urgency. Musculoskeletal: Negative for neck pain and neck stiffness. Neurological: Positive for numbness (Intermittent tingling of right arm). Negative for headaches. Psychiatric/Behavioral: Negative for confusion. PHYSICAL EXAM   (up to 7 for level 4, 8 or more for level 5)      INITIAL VITALS:   BP (!) 124/90   Pulse 73   Temp 97.2 °F (36.2 °C)   Resp 17   Ht 5' 4\" (1.626 m)   Wt 160 lb (72.6 kg)   SpO2 97%   BMI 27.46 kg/m²     Physical Exam  Constitutional:       General: She is not in acute distress. Appearance: She is not toxic-appearing. HENT:      Head: Normocephalic and atraumatic. Cardiovascular:      Rate and Rhythm: Normal rate. Heart sounds: No murmur. No friction rub. No gallop. Pulmonary:      Effort: No respiratory distress. Breath sounds: No wheezing, rhonchi or rales. Chest:      Chest wall: No tenderness. Abdominal:      General: There is no distension. Tenderness: There is no abdominal tenderness. There is no guarding. Skin:     Findings: No lesion. Neurological:      Mental Status: She is alert. Sensory: No sensory deficit. Motor: No weakness.       Gait: Gait normal.         DIFFERENTIAL  DIAGNOSIS     PLAN (LABS / IMAGING / EKG):  Orders Placed This Encounter   Procedures    XR CHEST PORTABLE    CBC Auto Differential    Comprehensive Metabolic Panel w/ Reflex to MG    HCG Qualitative, Serum    Troponin    Troponin    EKG 12 Lead       MEDICATIONS ORDERED:  Orders Placed This Encounter   Medications    aspirin chewable tablet 324 mg    aluminum & magnesium hydroxide-simethicone (MAALOX) 200-200-20 MG/5ML suspension 30 mL    pantoprazole (PROTONIX) 20 MG tablet     Sig: Take 2 tablets by mouth daily     Dispense:  30 tablet     Refill:  0       DDX: GERD, musculoskeletal chest pain, costochondritis, STEMI, NSTEMI, pancreatitis, pneumonia, gastritis    DIAGNOSTIC RESULTS / EMERGENCY DEPARTMENT COURSE / MDM   LAB RESULTS:  Results for orders placed or performed during the hospital encounter of 01/02/21   CBC Auto Differential   Result Value Ref Range    WBC 6.8 3.5 - 11.3 k/uL    RBC 3.81 (L) 3.95 - 5.11 m/uL    Hemoglobin 9.9 (L) 11.9 - 15.1 g/dL    Hematocrit 32.1 (L) 36.3 - 47.1 %    MCV 84.3 82.6 - 102.9 fL    MCH 26.0 25.2 - 33.5 pg    MCHC 30.8 28.4 - 34.8 g/dL    RDW 14.2 11.8 - 14.4 %    Platelets 327 817 - 442 k/uL    MPV 8.7 8.1 - 13.5 fL    NRBC Automated 0.0 0.0 per 100 WBC    Differential Type NOT REPORTED     Seg Neutrophils 35 (L) 36 - 65 %    Lymphocytes 53 (H) 24 - 43 %    Monocytes 7 3 - 12 %    Eosinophils % 4 1 - 4 %    Basophils 1 0 - 2 %    Immature Granulocytes 0 0 %    Segs Absolute 2.38 1.50 - 8.10 k/uL    Absolute Lymph # 3.63 1.10 - 3.70 k/uL    Absolute Mono # 0.47 0.10 - 1.20 k/uL    Absolute Eos # 0.26 0.00 - 0.44 k/uL    Basophils Absolute 0.07 0.00 - 0.20 k/uL    Absolute Immature Granulocyte <0.03 0.00 - 0.30 k/uL    WBC Morphology NOT REPORTED     RBC Morphology NOT REPORTED     Platelet Estimate NOT REPORTED    Comprehensive Metabolic Panel w/ Reflex to MG   Result Value Ref Range    Glucose 108 (H) 70 - 99 mg/dL    BUN 14 6 - 20 mg/dL    CREATININE 0.51 0.50 - 0.90 mg/dL    Bun/Cre Ratio NOT REPORTED 9 - 20    Calcium 8.3 (L) 8.6 - 10.4 mg/dL    Sodium 137 135 - 144 mmol/L    Potassium 3.8 3.7 - 5.3 mmol/L    Chloride 104 98 - 107 mmol/L    CO2 21 20 - 31 mmol/L    Anion Gap 12 9 - 17 mmol/L    Alkaline Phosphatase 48 35 - 104 U/L    ALT 9 5 - 33 U/L    AST 18 <32 U/L    Total Bilirubin <0.10 (L) 0.3 - 1.2 mg/dL    Total Protein 6.2 (L) 6.4 - 8.3 g/dL    Alb 3.7 3.5 - 5.2 g/dL    Albumin/Globulin Ratio 1.5 1.0 - 2.5    GFR Non-African American >60 >60 mL/min    GFR African American >60 >60 mL/min    GFR Comment          GFR Staging NOT REPORTED    HCG Qualitative, Serum   Result Value Ref Range    hCG Qual NEGATIVE NEGATIVE   Troponin   Result Value Ref Range    Troponin, High Sensitivity <6 0 - 14 ng/L    Troponin T NOT REPORTED <0.03 ng/mL    Troponin Interp NOT REPORTED    Troponin   Result Value Ref Range    Troponin, High Sensitivity <6 0 - 14 ng/L    Troponin T NOT REPORTED <0.03 ng/mL    Troponin Interp NOT REPORTED        IMPRESSION: 72-year-old female no acute distress presenting with chest pain, intermittent tingling of her arm. Numbness, tingling likely ulnar neuropathy. Chest pain likely secondary to GERD as patient is reporting some gastric reflux as well. Cardiac work-up unremarkable including normal troponin x2, normal CBC, normal CMP, unremarkable EKG and unremarkable chest x-ray. Patient treated emergency department initially with parent 24 mg p.o. aspirin. Patient reported no relief in symptoms after aspirin. After cardiac work-up completed patient was given Maalox. Reported improvement in symptoms with this medication. Patient has low risk of major cardiac event. Patient was discharged home with instructions to follow-up with her primary care physician for reevaluation. She was given strict ED return precautions as well    RADIOLOGY:  Xr Chest Portable    Result Date: 1/2/2021  EXAMINATION: ONE XRAY VIEW OF THE CHEST 1/2/2021 2:24 am COMPARISON: 04/30/2015 HISTORY: ORDERING SYSTEM PROVIDED HISTORY: Chest pain TECHNOLOGIST PROVIDED HISTORY: Chest pain Reason for Exam: chest pain left arm numbness   upright port FINDINGS: The cardiac silhouette appears within normal limits for size given portable technique. No convincing evidence of a focal consolidation. No pleural effusion or pneumothorax seen.      No acute cardiopulmonary abnormality. EKG  EKG Interpretation    Interpreted by me    Rhythm: normal sinus   Rate: normal  Axis: normal  Ectopy: none  Conduction: normal  ST Segments: no acute change  T Waves: no acute change  Q Waves: none    Clinical Impression: no acute changes and normal EKG    All EKG's are interpreted by the Emergency Department Physician who either signs or Co-signs this chart in the absence of a cardiologist.    PROCEDURES:  None    CONSULTS:  None    CRITICAL CARE:  Please see attending note    FINAL IMPRESSION      1.  Chest pain, unspecified type          DISPOSITION / PLAN     DISPOSITION Decision To Discharge 01/02/2021 03:21:41 AM      PATIENT REFERRED TO:  Aiden Mcclelland MD  [place] [address]  [phone]    Schedule an appointment as soon as possible for a visit in 1 week  For re-evaluation      DISCHARGE MEDICATIONS:  Discharge Medication List as of 1/2/2021  3:22 AM      START taking these medications    Details   pantoprazole (PROTONIX) 20 MG tablet Take 2 tablets by mouth daily, Disp-30 tablet, R-0Print             Arnulfo James DO  Emergency Medicine Resident    (Please note that portions of thisnote were completed with a voice recognition program.  Efforts were made to edit the dictations but occasionally words are mis-transcribed.)        Arnulfo James DO  Resident  01/02/21 [id number]

## 2021-01-03 LAB
EKG ATRIAL RATE: 75 BPM
EKG P AXIS: 58 DEGREES
EKG P-R INTERVAL: 152 MS
EKG Q-T INTERVAL: 378 MS
EKG QRS DURATION: 96 MS
EKG QTC CALCULATION (BAZETT): 422 MS
EKG R AXIS: 51 DEGREES
EKG T AXIS: 29 DEGREES
EKG VENTRICULAR RATE: 75 BPM

## 2021-01-03 PROCEDURE — 93010 ELECTROCARDIOGRAM REPORT: CPT | Performed by: INTERNAL MEDICINE

## 2021-12-31 ENCOUNTER — HOSPITAL ENCOUNTER (EMERGENCY)
Age: 46
Discharge: HOME | End: 2021-12-31
Attending: EMERGENCY MEDICINE
Payer: COMMERCIAL

## 2021-12-31 VITALS
SYSTOLIC BLOOD PRESSURE: 127 MMHG | OXYGEN SATURATION: 96 % | RESPIRATION RATE: 20 BRPM | TEMPERATURE: 100.8 F | WEIGHT: 160 LBS | BODY MASS INDEX: 26.66 KG/M2 | DIASTOLIC BLOOD PRESSURE: 80 MMHG | HEIGHT: 65 IN | HEART RATE: 117 BPM

## 2021-12-31 DIAGNOSIS — U07.1 COVID-19: Primary | ICD-10-CM

## 2021-12-31 PROCEDURE — 99283 EMERGENCY DEPT VISIT LOW MDM: CPT

## 2021-12-31 PROCEDURE — 6370000000 HC RX 637 (ALT 250 FOR IP): Performed by: STUDENT IN AN ORGANIZED HEALTH CARE EDUCATION/TRAINING PROGRAM

## 2021-12-31 RX ORDER — BENZONATATE 100 MG/1
100 CAPSULE ORAL ONCE
Status: COMPLETED | OUTPATIENT
Start: 2021-12-31 | End: 2021-12-31

## 2021-12-31 RX ORDER — ACETAMINOPHEN 500 MG
1000 TABLET ORAL ONCE
Status: COMPLETED | OUTPATIENT
Start: 2021-12-31 | End: 2021-12-31

## 2021-12-31 RX ORDER — BENZONATATE 100 MG/1
100 CAPSULE ORAL EVERY 8 HOURS PRN
Qty: 21 CAPSULE | Refills: 0 | Status: ON HOLD | OUTPATIENT
Start: 2021-12-31 | End: 2022-01-12

## 2021-12-31 RX ORDER — PREDNISONE 20 MG/1
40 TABLET ORAL ONCE
Status: COMPLETED | OUTPATIENT
Start: 2021-12-31 | End: 2021-12-31

## 2021-12-31 RX ADMIN — BENZONATATE 100 MG: 100 CAPSULE ORAL at 06:02

## 2021-12-31 RX ADMIN — PREDNISONE 40 MG: 20 TABLET ORAL at 06:20

## 2021-12-31 RX ADMIN — ACETAMINOPHEN 1000 MG: 500 TABLET ORAL at 06:02

## 2021-12-31 ASSESSMENT — PAIN DESCRIPTION - LOCATION: LOCATION: HEAD

## 2021-12-31 ASSESSMENT — PAIN SCALES - GENERAL
PAINLEVEL_OUTOF10: 2
PAINLEVEL_OUTOF10: 8

## 2021-12-31 ASSESSMENT — PAIN DESCRIPTION - FREQUENCY: FREQUENCY: INTERMITTENT

## 2021-12-31 ASSESSMENT — PAIN DESCRIPTION - DESCRIPTORS: DESCRIPTORS: ACHING

## 2021-12-31 NOTE — ED PROVIDER NOTES
[facility]  ED  Emergency Department Encounter  EmergencyMedicine Resident     Pt Candance Frizzle  MRN: [id number]  [place] 1975  Date of evaluation: 21  PCP:  Derrick Dominguez MD    [address] [place] [address]       Chief Complaint   Patient presents with    Positive For Covid-19    Cough    Headache       HISTORY OF PRESENT ILLNESS  (Location/Symptom, Timing/Onset, Context/Setting, Quality, Duration, Modifying Factors, Severity.)      Bhavin Aguilera is a 42 y.o. female who presents with COVID-19. Nasal COVID-19 couple days ago. Onset of symptoms was 1 week ago. Comes in the emergency department seeking symptomatic relief. She states that the coughing is quite intense and is keeping her awake at night and causing posttussive emesis. She denies any shortness of breath or chest pain. No history of DVT or PEs. No calf tenderness bilaterally. She is not COVID-19 vaccinated. Was a former smoker. No history of lung or heart disease. Tolerating fluids. PAST MEDICAL / SURGICAL / SOCIAL / FAMILY HISTORY      has a past medical history of Heart palpitations and Hypothyroidism. has a past surgical history that includes  section; Dilation and curettage of uterus; Breast enhancement surgery; Hand surgery (Right); and Umbilical hernia repair (N/A, 3/7/2019).     Social History     Socioeconomic History    Marital status: Single     Spouse name: Not on file    Number of children: Not on file    Years of education: Not on file    Highest education level: Not on file   Occupational History    Not on file   Tobacco Use    Smoking status: Former Smoker     Types: Cigarettes    Smokeless tobacco: Never Used    Tobacco comment: ''Lots of 2nd hand smoke''   Vaping Use    Vaping Use: Some days   Substance and Sexual Activity    Alcohol use: No    Drug use: No    Sexual activity: Yes     Partners: Male   Other Topics Concern    Not on file   Social History Narrative    Not on file Social Determinants of Health     Financial Resource Strain:     Difficulty of Paying Living Expenses: Not on file   Food Insecurity:     Worried About Running Out of Food in the Last Year: Not on file    Adam of Food in the Last Year: Not on file   Transportation Needs:     Lack of Transportation (Medical): Not on file    Lack of Transportation (Non-Medical): Not on file   Physical Activity:     Days of Exercise per Week: Not on file    Minutes of Exercise per Session: Not on file   Stress:     Feeling of Stress : Not on file   Social Connections:     Frequency of Communication with Friends and Family: Not on file    Frequency of Social Gatherings with Friends and Family: Not on file    Attends [employer] Services: Not on file    Active Member of [address] [place] [address] [employer] or Organizations: Not on file    Attends Club or Organization Meetings: Not on file    Marital Status: Not on file   Intimate Partner Violence:     Fear of Current or Ex-Partner: Not on file    Emotionally Abused: Not on file    Physically Abused: Not on file    Sexually Abused: Not on file   Housing Stability:     Unable to Pay for Housing in the Last Year: Not on file    Number of [place] in the Last Year: Not on file    Unstable Housing in the Last Year: Not on file       Family History   Problem Relation Age of Onset    Diabetes Other        Allergies:  Bee venom and Percocet [oxycodone-acetaminophen]    Home Medications:  Prior to Admission medications    Medication Sig Start Date End Date Taking?  Authorizing Provider   benzonatate (TESSALON) 100 MG capsule Take 1 capsule by mouth every 8 hours as needed for Cough 12/31/21 1/7/22 Jordon Melendez,    pantoprazole (PROTONIX) 20 MG tablet Take 2 tablets by mouth daily 1/2/21   Angelo Gould DO   ibuprofen (ADVIL;MOTRIN) 800 MG tablet Take 1 tablet by mouth every 8 hours as needed for Pain  Patient not taking: Reported on 9/30/2019 8/8/19   Yazmin Lewis MD cephALEXin (KEFLEX) 500 MG capsule Take 500 mg by mouth 3 times daily 3/31/19   Historical Provider, MD   sulfamethoxazole-trimethoprim (BACTRIM DS;SEPTRA DS) 800-160 MG per tablet Take 1 tablet by mouth 2 times daily 3/31/19   Historical Provider, MD   Multiple Vitamins-Minerals (THERAPEUTIC MULTIVITAMIN-MINERALS) tablet Take 1 tablet by mouth daily    Historical Provider, MD   ibuprofen (ADVIL;MOTRIN) 800 MG tablet Take 1 tablet by mouth every 8 hours as needed for Pain or Fever 3/7/19 3/17/19  Suzie Rajan MD   levothyroxine (SYNTHROID) 200 MCG tablet Take 0.5 tablets by mouth Daily. Patient taking differently:   Take 200 mcg by mouth Daily  7/26/14   See-Yin So, DO       REVIEW OF SYSTEMS    (2-9 systems for level 4, 10 or more for level 5)      Review of Systems   Constitutional: Positive for chills and fever. HENT: Positive for congestion and sore throat. Respiratory: Positive for cough. Negative for shortness of breath. Cardiovascular: Negative for chest pain. Gastrointestinal: Positive for vomiting. Negative for abdominal pain and nausea. Genitourinary: Negative for dysuria and frequency. Musculoskeletal: Negative for back pain and neck stiffness. Skin: Negative for rash. Neurological: Positive for headaches. Negative for weakness. PHYSICAL EXAM   (up to 7 for level 4, 8 or more for level 5)      INITIAL VITALS:   /80   Pulse 117   Temp 100.8 °F (38.2 °C) (Oral)   Resp 20   Ht 5' 5\" (1.651 m)   Wt 160 lb (72.6 kg)   SpO2 96%   BMI 26.63 kg/m²      Vitals:    12/31/21 0511   BP: 127/80   Pulse: 117   Resp: 20   Temp: 100.8 °F (38.2 °C)   TempSrc: Oral   SpO2: 96%   Weight: 160 lb (72.6 kg)   Height: 5' 5\" (1.651 m)        Physical Exam  Vitals reviewed. Constitutional:       General: She is not in acute distress. Appearance: Normal appearance. She is well-developed. She is ill-appearing. She is not toxic-appearing. HENT:      Head: Normocephalic and atraumatic. Eyes:      Extraocular Movements: Extraocular movements intact. Pupils: Pupils are equal, round, and reactive to light. Cardiovascular:      Rate and Rhythm: Normal rate and regular rhythm. Pulmonary:      Effort: Pulmonary effort is normal.      Breath sounds: Normal breath sounds. Abdominal:      General: There is no distension. Palpations: Abdomen is soft. Tenderness: There is no abdominal tenderness. Musculoskeletal:         General: No tenderness. Normal range of motion. Cervical back: Normal range of motion and neck supple. Right lower leg: No edema. Left lower leg: No edema. Skin:     General: Skin is warm and dry. Neurological:      General: No focal deficit present. Mental Status: She is alert and oriented to person, place, and time. DIFFERENTIAL  DIAGNOSIS     PLAN (LABS / IMAGING / EKG):  No orders of the defined types were placed in this encounter. MEDICATIONS ORDERED:  Orders Placed This Encounter   Medications    benzonatate (TESSALON) capsule 100 mg    acetaminophen (TYLENOL) tablet 1,000 mg    benzonatate (TESSALON) 100 MG capsule     Sig: Take 1 capsule by mouth every 8 hours as needed for Cough     Dispense:  21 capsule     Refill:  0    predniSONE (DELTASONE) tablet 40 mg       DIAGNOSTIC RESULTS / EMERGENCY DEPARTMENT COURSE / MDM   LAB RESULTS:  No results found for this visit on 12/31/21. RADIOLOGY:  No orders to display        [facility]:    This is a 81-year-old female otherwise healthy coming here for symptoms COVID-19. She appears ill but nontoxic, no respiratory distress, vital signs showing slight fever and tachycardia, exam negative for abnormal lung sounds, signs of DVT, dehydration. No oxygen desaturation noted on 5-minute march test. Patient treated with Tessalon Perles, Tylenol and a one-time dose of prednisone.  Instructed to take zinc, lemon tea, vitamin C stay well-hydrated use Tylenol and [employer] as needed as prescribed. Strong return precautions for chest pain, shortness of breath signs of DVT. ED Course as of 12/31/21 0622   Fri Dec 31, 2021   0602 97% on march test [CS]      ED Course User Index  [CS] Shanika Doherty DO         PROCEDURES:    CONSULTS:  None    CRITICAL CARE:  Please see attending note    FINAL IMPRESSION      1. COVID-19       DISPOSITION / PLAN     DISPOSITION Decision To Discharge 12/31/2021 06:05:47 AM      PATIENT REFERRED TO:  No follow-up provider specified.     DISCHARGE MEDICATIONS:  Discharge Medication List as of 12/31/2021  6:08 AM      START taking these medications    Details   benzonatate (TESSALON) 100 MG capsule Take 1 capsule by mouth every 8 hours as needed for Cough, Disp-21 capsule, R-0Print             Shanika Doherty DO  Emergency Medicine Resident    (Please note that portions of thisnote were completed with a voice recognition program.  Efforts were made to edit the dictations but occasionally words are mis-transcribed.)       Shanika Doherty DO  Resident  12/31/21 4041

## 2021-12-31 NOTE — Clinical Note
Javier Lujan was seen and treated in our emergency department on 12/31/2021. COVID19 virus is suspected. Per the CDC guidelines we recommend home isolation until the following conditions are all met:    1. At least 10 days have passed since symptoms first appeared and  2. At least 24 hours have passed since last fever without the use of fever-reducing medications and  3. Symptoms (e.g., cough, shortness of breath) have improved    If you have any questions or concerns, please don't hesitate to call.     She may return to work/school on 01/03/2022        Gini Huff, DO

## 2021-12-31 NOTE — Clinical Note
Pepper Garcia was seen and treated in our emergency department on 12/31/2021. COVID19 virus is suspected. Per the CDC guidelines we recommend home isolation until the following conditions are all met:    1. At least 10 days have passed since symptoms first appeared and  2. At least 24 hours have passed since last fever without the use of fever-reducing medications and  3. Symptoms (e.g., cough, shortness of breath) have improved    If you have any questions or concerns, please don't hesitate to call.     She may return to work/school on 01/03/2022        Veto Eubanks,

## 2021-12-31 NOTE — ED NOTES
Pt ambulated with pulse ox, SpO2 above 95% throughout ambulated and at rest afterwards. No signs of distress noted.       Wild Nichols RN  12/31/21 2843

## 2021-12-31 NOTE — ED NOTES
Pt to ED for cough, chest pain, and headache from Covid-19 diagnosis 1 week ago. Pt states she's been taking tylenol and ibuprofen at home to control her fever and attempted to wean off of it to let her fever fight the illness, but states her temp reached up to 104 F. Pt is alert and oriented x4, RR even and nonlabored. She denies any further needs at this time.       Violeta Trotter RN  12/31/21 [id number]

## 2021-12-31 NOTE — ED PROVIDER NOTES
[facility]     Emergency Department     Faculty Attestation    I performed a history and physical examination of the patient and discussed management with the resident. I have reviewed and agree with the residents findings including all diagnostic interpretations, and treatment plans as written. Any areas of disagreement are noted on the chart. I was personally present for the key portions of any procedures. I have documented in the chart those procedures where I was not present during the key portions. I have reviewed the emergency nurses triage note. I agree with the chief complaint, past medical history, past surgical history, allergies, medications, social and family history as documented unless otherwise noted below. Documentation of the HPI, Physical Exam and Medical Decision Making performed by scribpooja is based on my personal performance of the HPI, PE and MDM. For Physician Assistant/ Nurse Practitioner cases/documentation I have personally evaluated this patient and have completed at least one if not all key elements of the E/M (history, physical exam, and MDM). Additional findings are as noted. 43 yo F c/o aches, cough, covid + 2 d prior, no vomit, + fever at home,   pe 100.8, hr normalizing, gcs 15 adela, eomi, no pronator drift, neck supple with full rom,   Talking in complete sentences,     No desaturation with march test, vss, tolerating po, discharged  Instruction given,    EKG Interpretation    Interpreted by me      CRITICAL CARE: There was a high probability of clinically significant/life threatening deterioration in this patient's condition which required my urgent intervention. Total critical care time was 5 minutes. This excludes any time for separately reportable procedures.        Sumit 24, DO  12/31/21 [address], DO  12/31/21 [id number]

## 2022-01-02 ENCOUNTER — APPOINTMENT (OUTPATIENT)
Dept: GENERAL RADIOLOGY | Age: 47
End: 2022-01-02
Payer: COMMERCIAL

## 2022-01-02 ENCOUNTER — HOSPITAL ENCOUNTER (EMERGENCY)
Age: 47
Discharge: HOME | End: 2022-01-02
Attending: EMERGENCY MEDICINE
Payer: COMMERCIAL

## 2022-01-02 VITALS
OXYGEN SATURATION: 95 % | DIASTOLIC BLOOD PRESSURE: 78 MMHG | BODY MASS INDEX: 25.83 KG/M2 | RESPIRATION RATE: 14 BRPM | WEIGHT: 155 LBS | HEIGHT: 65 IN | TEMPERATURE: 98.5 F | HEART RATE: 82 BPM | SYSTOLIC BLOOD PRESSURE: 114 MMHG

## 2022-01-02 DIAGNOSIS — R05.9 COUGH: ICD-10-CM

## 2022-01-02 DIAGNOSIS — R06.02 SHORTNESS OF BREATH: Primary | ICD-10-CM

## 2022-01-02 DIAGNOSIS — E87.6 HYPOKALEMIA: ICD-10-CM

## 2022-01-02 LAB
ABSOLUTE EOS #: 0 K/UL (ref 0–0.4)
ABSOLUTE IMMATURE GRANULOCYTE: 0 K/UL (ref 0–0.3)
ABSOLUTE LYMPH #: 1.04 K/UL (ref 1–4.8)
ABSOLUTE MONO #: 0.26 K/UL (ref 0.1–0.8)
ALBUMIN SERPL-MCNC: 3.8 G/DL (ref 3.5–5.2)
ALBUMIN/GLOBULIN RATIO: 1.1 (ref 1–2.5)
ALP BLD-CCNC: 47 U/L (ref 35–104)
ALT SERPL-CCNC: 13 U/L (ref 5–33)
ANION GAP SERPL CALCULATED.3IONS-SCNC: 14 MMOL/L (ref 9–17)
AST SERPL-CCNC: 26 U/L
BASOPHILS # BLD: 0 % (ref 0–2)
BASOPHILS ABSOLUTE: 0 K/UL (ref 0–0.2)
BILIRUB SERPL-MCNC: 0.19 MG/DL (ref 0.3–1.2)
BUN BLDV-MCNC: 7 MG/DL (ref 6–20)
BUN/CREAT BLD: ABNORMAL (ref 9–20)
CALCIUM SERPL-MCNC: 8.1 MG/DL (ref 8.6–10.4)
CHLORIDE BLD-SCNC: 98 MMOL/L (ref 98–107)
CO2: 21 MMOL/L (ref 20–31)
CREAT SERPL-MCNC: 0.72 MG/DL (ref 0.5–0.9)
DIFFERENTIAL TYPE: ABNORMAL
DIRECT EXAM: NORMAL
EOSINOPHILS RELATIVE PERCENT: 0 % (ref 1–4)
GFR AFRICAN AMERICAN: >60 ML/MIN
GFR NON-AFRICAN AMERICAN: >60 ML/MIN
GFR SERPL CREATININE-BSD FRML MDRD: ABNORMAL ML/MIN/{1.73_M2}
GFR SERPL CREATININE-BSD FRML MDRD: ABNORMAL ML/MIN/{1.73_M2}
GLUCOSE BLD-MCNC: 97 MG/DL (ref 70–99)
HCT VFR BLD CALC: 34.3 % (ref 36.3–47.1)
HEMOGLOBIN: 11.2 G/DL (ref 11.9–15.1)
IMMATURE GRANULOCYTES: 0 %
LYMPHOCYTES # BLD: 20 % (ref 24–44)
Lab: NORMAL
MCH RBC QN AUTO: 26.1 PG (ref 25.2–33.5)
MCHC RBC AUTO-ENTMCNC: 32.7 G/DL (ref 28.4–34.8)
MCV RBC AUTO: 80 FL (ref 82.6–102.9)
MONOCYTES # BLD: 5 % (ref 1–7)
MORPHOLOGY: ABNORMAL
MORPHOLOGY: ABNORMAL
NRBC AUTOMATED: 0 PER 100 WBC
PDW BLD-RTO: 15.2 % (ref 11.8–14.4)
PLATELET # BLD: 326 K/UL (ref 138–453)
PLATELET ESTIMATE: ABNORMAL
PMV BLD AUTO: 9.1 FL (ref 8.1–13.5)
POTASSIUM SERPL-SCNC: 3.3 MMOL/L (ref 3.7–5.3)
RBC # BLD: 4.29 M/UL (ref 3.95–5.11)
RBC # BLD: ABNORMAL 10*6/UL
SEG NEUTROPHILS: 75 % (ref 36–66)
SEGMENTED NEUTROPHILS ABSOLUTE COUNT: 3.9 K/UL (ref 1.8–7.7)
SODIUM BLD-SCNC: 133 MMOL/L (ref 135–144)
SPECIMEN DESCRIPTION: NORMAL
TOTAL PROTEIN: 7.3 G/DL (ref 6.4–8.3)
TROPONIN INTERP: NORMAL
TROPONIN T: NORMAL NG/ML
TROPONIN, HIGH SENSITIVITY: <6 NG/L (ref 0–14)
TSH SERPL DL<=0.05 MIU/L-ACNC: 4.48 MIU/L (ref 0.3–5)
WBC # BLD: 5.2 K/UL (ref 3.5–11.3)
WBC # BLD: ABNORMAL 10*3/UL

## 2022-01-02 PROCEDURE — 71045 X-RAY EXAM CHEST 1 VIEW: CPT

## 2022-01-02 PROCEDURE — 99284 EMERGENCY DEPT VISIT MOD MDM: CPT

## 2022-01-02 PROCEDURE — 84443 ASSAY THYROID STIM HORMONE: CPT

## 2022-01-02 PROCEDURE — 93005 ELECTROCARDIOGRAM TRACING: CPT | Performed by: STUDENT IN AN ORGANIZED HEALTH CARE EDUCATION/TRAINING PROGRAM

## 2022-01-02 PROCEDURE — 85025 COMPLETE CBC W/AUTO DIFF WBC: CPT

## 2022-01-02 PROCEDURE — 87804 INFLUENZA ASSAY W/OPTIC: CPT

## 2022-01-02 PROCEDURE — 6370000000 HC RX 637 (ALT 250 FOR IP): Performed by: STUDENT IN AN ORGANIZED HEALTH CARE EDUCATION/TRAINING PROGRAM

## 2022-01-02 PROCEDURE — 80053 COMPREHEN METABOLIC PANEL: CPT

## 2022-01-02 PROCEDURE — 84484 ASSAY OF TROPONIN QUANT: CPT

## 2022-01-02 RX ORDER — GUAIFENESIN/DEXTROMETHORPHAN 100-10MG/5
5 SYRUP ORAL 3 TIMES DAILY PRN
Qty: 120 ML | Refills: 0 | Status: SHIPPED | OUTPATIENT
Start: 2022-01-02 | End: 2022-01-12

## 2022-01-02 RX ORDER — ASPIRIN 81 MG/1
324 TABLET, CHEWABLE ORAL ONCE
Status: COMPLETED | OUTPATIENT
Start: 2022-01-02 | End: 2022-01-02

## 2022-01-02 RX ORDER — ACETAMINOPHEN 500 MG
1000 TABLET ORAL ONCE
Status: COMPLETED | OUTPATIENT
Start: 2022-01-02 | End: 2022-01-02

## 2022-01-02 RX ORDER — GUAIFENESIN DEXTROMETHORPHAN HYDROBROMIDE ORAL SOLUTION 10; 100 MG/5ML; MG/5ML
10 SOLUTION ORAL EVERY 4 HOURS PRN
Status: DISCONTINUED | OUTPATIENT
Start: 2022-01-02 | End: 2022-01-02 | Stop reason: HOSPADM

## 2022-01-02 RX ADMIN — POTASSIUM BICARBONATE 40 MEQ: 782 TABLET, EFFERVESCENT ORAL at 03:59

## 2022-01-02 RX ADMIN — ACETAMINOPHEN 1000 MG: 500 TABLET ORAL at 01:22

## 2022-01-02 RX ADMIN — GUAIFENESIN DEXTROMETHORPHAN HYDROBROMIDE ORAL SOLUTION 10 ML: 200; 20 SOLUTION ORAL at 01:22

## 2022-01-02 RX ADMIN — ASPIRIN 324 MG: 81 TABLET, CHEWABLE ORAL at 01:22

## 2022-01-02 ASSESSMENT — ENCOUNTER SYMPTOMS
ABDOMINAL PAIN: 0
PHOTOPHOBIA: 0
BACK PAIN: 0
VOMITING: 0
COUGH: 1
NAUSEA: 0
SHORTNESS OF BREATH: 1
DIARRHEA: 0

## 2022-01-02 NOTE — ED PROVIDER NOTES
[facility] ED  Emergency Department Encounter  EmergencyMedicine Resident     Pt Hedy Rankin  MRN: [id number]  [place] 1975  Date of evaluation: 22  PCP:  Sabas Patel MD    This patient was evaluated in the Emergency Department for symptoms described in the history of present illness. The patient was evaluated in the context of the global COVID-19 pandemic, which necessitated consideration that the patient might be at risk for infection with the SARS-CoV-2 virus that causes COVID-19. Institutional protocols and algorithms that pertain to the evaluation of patients at risk for COVID-19 are in a state of rapid change based on information released by regulatory bodies including the CDC and federal and state organizations. These policies and algorithms were followed during the patient's care in the ED. CHIEF COMPLAINT       Chief Complaint   Patient presents with    Shortness of Breath    Cough       HISTORY OF PRESENT ILLNESS  (Location/Symptom, Timing/Onset, Context/Setting, Quality, Duration, Modifying Factors, Severity.)      Charles Alvarado is a 42 y.o. female who presents with a feeling, shortness of breath, cough. Patient was diagnosed with COVID-19 multiple days ago patient advised against Covid, unvaccinated against influenza. Smokes cigarettes. No history of pe. No recent trauma immobiliztion no calf tenderness no ocp    PAST MEDICAL / SURGICAL / SOCIAL / FAMILY HISTORY      has a past medical history of Heart palpitations and Hypothyroidism. has a past surgical history that includes  section; Dilation and curettage of uterus; Breast enhancement surgery; Hand surgery (Right); and Umbilical hernia repair (N/A, 3/7/2019).       Social History     Socioeconomic History    Marital status: Single     Spouse name: Not on file    Number of children: Not on file    Years of education: Not on file    Highest education level: Not on file   Occupational History    Not on file   Tobacco Use    Smoking status: Former Smoker     Types: Cigarettes    Smokeless tobacco: Never Used    Tobacco comment: ''Lots of 2nd hand smoke''   Vaping Use    Vaping Use: Some days   Substance and Sexual Activity    Alcohol use: No    Drug use: No    Sexual activity: Yes     Partners: Male   Other Topics Concern    Not on file   Social History Narrative    Not on file     Social Determinants of Health     Financial Resource Strain:     Difficulty of Paying Living Expenses: Not on file   Food Insecurity:     Worried About Running Out of Food in the Last Year: Not on file    Adam of Food in the Last Year: Not on file   Transportation Needs:     Lack of Transportation (Medical): Not on file    Lack of Transportation (Non-Medical): Not on file   Physical Activity:     Days of Exercise per Week: Not on file    Minutes of Exercise per Session: Not on file   Stress:     Feeling of Stress : Not on file   Social Connections:     Frequency of Communication with Friends and Family: Not on file    Frequency of Social Gatherings with Friends and Family: Not on file    Attends Latter-day Services: Not on file    Active Member of [address] [place] [address] or Organizations: Not on file    Attends Club or Organization Meetings: Not on file    Marital Status: Not on file   Intimate Partner Violence:     Fear of Current or Ex-Partner: Not on file    Emotionally Abused: Not on file    Physically Abused: Not on file    Sexually Abused: Not on file   Housing Stability:     Unable to Pay for Housing in the Last Year: Not on file    Number of [place] in the Last Year: Not on file    Unstable Housing in the Last Year: Not on file       Family History   Problem Relation Age of Onset    Diabetes Other        Allergies:  Bee venom and Percocet [oxycodone-acetaminophen]    Home Medications:  Prior to Admission medications    Medication Sig Start Date End Date Taking?  Authorizing Provider benzonatate (TESSALON) 100 MG capsule Take 1 capsule by mouth every 8 hours as needed for Cough 12/31/21 1/7/22  Josselyn Motta,    pantoprazole (PROTONIX) 20 MG tablet Take 2 tablets by mouth daily 1/2/21   Kaylin Hodge DO   ibuprofen (ADVIL;MOTRIN) 800 MG tablet Take 1 tablet by mouth every 8 hours as needed for Pain  Patient not taking: Reported on 9/30/2019 8/8/19   Theone Gowers, MD   cephALEXin (KEFLEX) 500 MG capsule Take 500 mg by mouth 3 times daily 3/31/19   Historical Provider, MD   sulfamethoxazole-trimethoprim (BACTRIM DS;SEPTRA DS) 800-160 MG per tablet Take 1 tablet by mouth 2 times daily 3/31/19   Historical Provider, MD   Multiple Vitamins-Minerals (THERAPEUTIC MULTIVITAMIN-MINERALS) tablet Take 1 tablet by mouth daily    Historical Provider, MD   ibuprofen (ADVIL;MOTRIN) 800 MG tablet Take 1 tablet by mouth every 8 hours as needed for Pain or Fever 3/7/19 3/17/19  Ashley Harrington MD   levothyroxine (SYNTHROID) 200 MCG tablet Take 0.5 tablets by mouth Daily. Patient taking differently:   Take 200 mcg by mouth Daily  7/26/14   See-Yin So, DO       REVIEW OF SYSTEMS    (2-9 systems for level 4, 10 or more for level 5)      Review of Systems   Constitutional: Positive for activity change, appetite change, chills, fatigue and fever. HENT: Negative for congestion. Eyes: Negative for photophobia. Respiratory: Positive for cough and shortness of breath. Cardiovascular: Negative for chest pain. Gastrointestinal: Negative for abdominal pain, diarrhea, nausea and vomiting. Genitourinary: Negative for dysuria. Musculoskeletal: Negative for back pain. Skin: Negative for pallor, rash and wound. Allergic/Immunologic: Positive for environmental allergies. Neurological: Negative for dizziness and headaches. Hematological: Negative for adenopathy. Psychiatric/Behavioral: Negative for agitation.        PHYSICAL EXAM   (up to 7 for level 4, 8 or more for level 5)      INITIAL VITALS:   /84   Pulse 111   Temp 100.9 °F (38.3 °C) (Oral)   Resp 20   Ht 5' 5\" (1.651 m)   Wt 155 lb (70.3 kg)   SpO2 94%   BMI 25.79 kg/m²     Physical Exam  Vitals and nursing note reviewed. Constitutional:       Appearance: Normal appearance. She is not toxic-appearing or diaphoretic. Comments: Uncomfortable, nontoxic-appearing   HENT:      Head: Normocephalic and atraumatic. Right Ear: External ear normal.      Left Ear: External ear normal.      Nose: Congestion present. Mouth/Throat:      Pharynx: Oropharynx is clear. Eyes:      General: No scleral icterus. Conjunctiva/sclera: Conjunctivae normal.   Cardiovascular:      Rate and Rhythm: Normal rate. Pulses: Normal pulses. Pulmonary:      Effort: Pulmonary effort is normal.      Breath sounds: No wheezing. Abdominal:      Palpations: Abdomen is soft. Musculoskeletal:         General: Normal range of motion. Cervical back: Normal range of motion. Skin:     General: Skin is warm. Capillary Refill: Capillary refill takes less than 2 seconds. Neurological:      Mental Status: She is alert and oriented to person, place, and time.    Psychiatric:         Mood and Affect: Mood normal.         DIFFERENTIAL  DIAGNOSIS     PLAN (LABS / IMAGING / EKG):  Orders Placed This Encounter   Procedures    Rapid influenza A/B antigens    XR CHEST PORTABLE    CBC WITH AUTO DIFFERENTIAL    COMPREHENSIVE METABOLIC PANEL    Troponin    TSH with Reflex    Check Pulse Oximetry while ambulating    Vital signs    EKG 12 Lead       MEDICATIONS ORDERED:  Orders Placed This Encounter   Medications    aspirin chewable tablet 324 mg    DISCONTD: dextromethorphan-guaiFENesin (ROBITUSSIN-DM)  MG/5ML liquid 10 mL    acetaminophen (TYLENOL) tablet 1,000 mg    potassium bicarb-citric acid (EFFER-K) effervescent tablet 40 mEq    guaiFENesin-dextromethorphan (ROBITUSSIN DM) 100-10 MG/5ML syrup     Sig: Take 5 mLs by mouth 3 times daily as needed for Cough     Dispense:  120 mL     Refill:  0       DDX: Pneumonia, known Covid, thyroid abnormality, ACS    DIAGNOSTIC RESULTS / EMERGENCY DEPARTMENT COURSE / MDM   LAB RESULTS:  Results for orders placed or performed during the hospital encounter of 01/02/22   Rapid influenza A/B antigens    Specimen: Nasopharyngeal Swab   Result Value Ref Range    Specimen Description . NASOPHARYNGEAL SWAB     Special Requests NOT REPORTED     Direct Exam       NEGATIVE for Influenza A + B antigens. PCR testing to confirm this result is available upon request.  Specimen will be saved in the laboratory for 7 days. Please call [phone] if PCR testing is indicated.    CBC WITH AUTO DIFFERENTIAL   Result Value Ref Range    WBC 5.2 3.5 - 11.3 k/uL    RBC 4.29 3.95 - 5.11 m/uL    Hemoglobin 11.2 (L) 11.9 - 15.1 g/dL    Hematocrit 34.3 (L) 36.3 - 47.1 %    MCV 80.0 (L) 82.6 - 102.9 fL    MCH 26.1 25.2 - 33.5 pg    MCHC 32.7 28.4 - 34.8 g/dL    RDW 15.2 (H) 11.8 - 14.4 %    Platelets 519 009 - 460 k/uL    MPV 9.1 8.1 - 13.5 fL    NRBC Automated 0.0 0.0 per 100 WBC    Differential Type NOT REPORTED     WBC Morphology NOT REPORTED     RBC Morphology NOT REPORTED     Platelet Estimate NOT REPORTED     Immature Granulocytes 0 0 %    Seg Neutrophils 75 (H) 36 - 66 %    Lymphocytes 20 (L) 24 - 44 %    Monocytes 5 1 - 7 %    Eosinophils % 0 (L) 1 - 4 %    Basophils 0 0 - 2 %    Absolute Immature Granulocyte 0.00 0.00 - 0.30 k/uL    Segs Absolute 3.90 1.8 - 7.7 k/uL    Absolute Lymph # 1.04 1.0 - 4.8 k/uL    Absolute Mono # 0.26 0.1 - 0.8 k/uL    Absolute Eos # 0.00 0.0 - 0.4 k/uL    Basophils Absolute 0.00 0.0 - 0.2 k/uL    Morphology ANISOCYTOSIS PRESENT     Morphology MICROCYTOSIS PRESENT    COMPREHENSIVE METABOLIC PANEL   Result Value Ref Range    Glucose 97 70 - 99 mg/dL    BUN 7 6 - 20 mg/dL    CREATININE 0.72 0.50 - 0.90 mg/dL    Bun/Cre Ratio NOT REPORTED 9 - 20    Calcium 8.1 (L) 8.6 - 10.4 mg/dL    Sodium 133 (L) 135 - 144 mmol/L    Potassium 3.3 (L) 3.7 - 5.3 mmol/L    Chloride 98 98 - 107 mmol/L    CO2 21 20 - 31 mmol/L    Anion Gap 14 9 - 17 mmol/L    Alkaline Phosphatase 47 35 - 104 U/L    ALT 13 5 - 33 U/L    AST 26 <32 U/L    Total Bilirubin 0.19 (L) 0.3 - 1.2 mg/dL    Total Protein 7.3 6.4 - 8.3 g/dL    Albumin 3.8 3.5 - 5.2 g/dL    Albumin/Globulin Ratio 1.1 1.0 - 2.5    GFR Non-African American >60 >60 mL/min    GFR African American >60 >60 mL/min    GFR Comment          GFR Staging NOT REPORTED    Troponin   Result Value Ref Range    Troponin, High Sensitivity <6 0 - 14 ng/L    Troponin T NOT REPORTED <0.03 ng/mL    Troponin Interp NOT REPORTED    TSH with Reflex   Result Value Ref Range    TSH 4.48 0.30 - 5.00 mIU/L       IMPRESSION: Patient is an uncomfortable nontoxic 68-year-old female with known diagnosis of COVID-19 several days ago. She is presenting with worsening shortness of breath cough hypoxic on room air no desaturation with ambulation plan will be broad work-up including cardiac evaluation, EKG, troponin, chest x-ray will basic labs will check TSH given patient's history of hypothyroidism. Will provide Robitussin reevaluate    RADIOLOGY:  XR CHEST PORTABLE    Result Date: 1/2/2022  EXAMINATION: ONE XRAY VIEW OF THE CHEST 1/2/2022 1:12 am COMPARISON: 01/02/2021. HISTORY: ORDERING SYSTEM PROVIDED HISTORY: sob cough TECHNOLOGIST PROVIDED HISTORY: sob cough Reason for Exam: Upright port, Cough, SOB FINDINGS: Cardiomediastinal silhouette appears within normal limits. Scattered bilateral patchy airspace opacities, most notable in the left mid and right lower lung zones. Costophrenic angles are sharp. No evidence of pneumothorax. No acute osseous abnormalities. Scattered bilateral patchy airspace opacities, most notable in the left mid and right lower lung zones.   Finding concerning for multifocal pneumonia, possibly COVID-19 pneumonia in the appropriate clinical setting. EMERGENCY DEPARTMENT COURSE:  ED Course as of 01/02/22 0706   Trevor Martinez Jan 02, 2022   0122 Xr reviewed [BG]   7952 Rapid influenza A/B antigens:    Specimen Description . NASOPHARYNGEAL SWAB   Special Requests NOT REPORTED   DIRECT EXAM. NEGATIVE for Influenza A + B antigens. PCR testing to confirm this result is available upon request.  Specimen will be saved in the laboratory for 7 days. Please call [phone] if PCR testing is indicated. [BG]   0156 CBC WITH AUTO DIFFERENTIAL(!):    WBC 5.2   RBC 4.29   Hemoglobin Quant 11.2(!)   Hematocrit 34.3(!)   MCV 80.0(!)   MCH 26.1   MCHC 32.7   RDW 15.2(!)   Platelet Count 737   MPV 9.1   NRBC Automated 0.0   Differential Type NOT REPORTED   Seg Neutrophils PENDING   Lymphocytes PENDING   Monocytes PENDING   Eosinophils % PENDING   Basophils PENDING   Immature Granulocytes PENDING   Segs Absolute PENDING   Absolute Lymph # PENDING   Absolute Mono # PENDING   Absolute Eos # PENDING   Basophils Absolute PENDING   Absolute Immature Granulocyte PENDING   WBC Morphology NOT REPORTED   RBC Morphology NOT REPORTED   Platelet Estimate NOT REPORTED [BG]   0352 Repeat vitals temp 98.7, hr 78, rr 14 bp 109/70 [BG]      ED Course User Index  [BG] Christiano Reyna DO         PROCEDURES:      CONSULTS:  None    CRITICAL CARE:      FINAL IMPRESSION      1. Shortness of breath    2. Cough    3.  Hypokalemia          DISPOSITION / PLAN     DISPOSITION  dc      PATIENT REFERRED TO:  Erin Kline MD  [place] [address] [address] [id number] [phone]    Call today  for followup and reevaluation in 1-2 days    [facility]  [address] [facility]  [phone]  Go to   If symptoms worsen, As needed      DISCHARGE MEDICATIONS:  Discharge Medication List as of 1/2/2022  3:52 AM          Christiano Reyna DO  Emergency Medicine Resident    (Please note that portions of thisnote were completed with a voice recognition program.  Efforts were made to edit the dictations but occasionally words are mis-transcribed.)        Sherif Spaulding DO  Resident  01/02/22 0896

## 2022-01-02 NOTE — ED NOTES
Pt ambulated from triage to ED 23.  Pt is a/o x4. Pt states that she tested Covid + on [facility] day. Pt was seen here a couple of days ago for worsening cough with SOB. Pt states she was given Tessalon Pearls which did not help. Pt stated she has increasing SOB when ambulating. Pt placed on cardiac monitor. IV, labs and EKG obtained. Call light placed within reach. Pt denied any needs at this time. Lights dimmed per request.  NAD noted. Will continue to monitor.      Soren Reina RN  01/02/22 4877

## 2022-01-02 NOTE — ED PROVIDER NOTES
[facility] ED  eMERGENCY dEPARTMENT eNCOUnter   Attending Attestation     Pt Name: Cristel Manzanares  MRN: [id number]  [place] 1975  Date of evaluation: 1/2/22       Cristel Manzanares is a 42 y.o. female who presents with Shortness of Breath and Cough      History: Pt presents with Covid and worsening symptoms notably cough and sob. Plan for march test, symptomatic relief. Likely discharge if improved. I performed a history and physical examination of the patient and discussed management with the resident. I reviewed the residents note and agree with the documented findings and plan of care. Any areas of disagreement are noted on the chart. I was personally present for the key portions of any procedures. I have documented in the chart those procedures where I was not present during the key portions. I have personally reviewed all images and agree with the resident's interpretation. I have reviewed the emergency nurses triage note. I agree with the chief complaint, past medical history, past surgical history, allergies, medications, social and family history as documented unless otherwise noted below. Documentation of the HPI, Physical Exam and Medical Decision Making performed by medical students or scribes is based on my personal performance of the HPI, PE and MDM. For Phys Assistant/ Nurse Practitioner cases/documentation I have had a face to face evaluation of this patient and have completed at least one if not all key elements of the E/M (history, physical exam, and MDM). Additional findings are as noted. For APC cases I have personally evaluated and examined the patient in conjunction with the APC and agree with the treatment plan and disposition of the patient as recorded by the APC.     Silvestre Puga MD  Attending Emergency  Physician       Arleth Nava MD  01/02/22 3060

## 2022-01-02 NOTE — ED NOTES
Bed: 23  Expected date:   Expected time:   Means of arrival:   Comments:  9 Margie Ibarra RN  01/02/22 [id number]

## 2022-01-03 ENCOUNTER — APPOINTMENT (OUTPATIENT)
Dept: CT IMAGING | Age: 47
End: 2022-01-03
Payer: MEDICAID

## 2022-01-03 ENCOUNTER — HOSPITAL ENCOUNTER (EMERGENCY)
Age: 47
Discharge: HOME | End: 2022-01-03
Attending: EMERGENCY MEDICINE
Payer: MEDICAID

## 2022-01-03 ENCOUNTER — CARE COORDINATION (OUTPATIENT)
Dept: CARE COORDINATION | Age: 47
End: 2022-01-03

## 2022-01-03 VITALS
SYSTOLIC BLOOD PRESSURE: 121 MMHG | RESPIRATION RATE: 18 BRPM | OXYGEN SATURATION: 95 % | DIASTOLIC BLOOD PRESSURE: 68 MMHG | BODY MASS INDEX: 25.83 KG/M2 | HEART RATE: 85 BPM | WEIGHT: 155 LBS | HEIGHT: 65 IN | TEMPERATURE: 97.6 F

## 2022-01-03 DIAGNOSIS — I67.1 CEREBRAL ARTERIAL ANEURYSM: ICD-10-CM

## 2022-01-03 DIAGNOSIS — R42 DIZZINESS: Primary | ICD-10-CM

## 2022-01-03 LAB
EKG ATRIAL RATE: 98 BPM
EKG P AXIS: 68 DEGREES
EKG P-R INTERVAL: 134 MS
EKG Q-T INTERVAL: 324 MS
EKG QRS DURATION: 82 MS
EKG QTC CALCULATION (BAZETT): 413 MS
EKG R AXIS: 49 DEGREES
EKG T AXIS: 34 DEGREES
EKG VENTRICULAR RATE: 98 BPM

## 2022-01-03 PROCEDURE — 6360000002 HC RX W HCPCS: Performed by: STUDENT IN AN ORGANIZED HEALTH CARE EDUCATION/TRAINING PROGRAM

## 2022-01-03 PROCEDURE — 93010 ELECTROCARDIOGRAM REPORT: CPT | Performed by: INTERNAL MEDICINE

## 2022-01-03 PROCEDURE — 96374 THER/PROPH/DIAG INJ IV PUSH: CPT

## 2022-01-03 PROCEDURE — 6360000004 HC RX CONTRAST MEDICATION: Performed by: STUDENT IN AN ORGANIZED HEALTH CARE EDUCATION/TRAINING PROGRAM

## 2022-01-03 PROCEDURE — 96361 HYDRATE IV INFUSION ADD-ON: CPT

## 2022-01-03 PROCEDURE — 6370000000 HC RX 637 (ALT 250 FOR IP): Performed by: STUDENT IN AN ORGANIZED HEALTH CARE EDUCATION/TRAINING PROGRAM

## 2022-01-03 PROCEDURE — 99283 EMERGENCY DEPT VISIT LOW MDM: CPT

## 2022-01-03 PROCEDURE — 94640 AIRWAY INHALATION TREATMENT: CPT

## 2022-01-03 PROCEDURE — 99285 EMERGENCY DEPT VISIT HI MDM: CPT | Performed by: PSYCHIATRY & NEUROLOGY

## 2022-01-03 PROCEDURE — 94664 DEMO&/EVAL PT USE INHALER: CPT

## 2022-01-03 PROCEDURE — 70496 CT ANGIOGRAPHY HEAD: CPT

## 2022-01-03 PROCEDURE — 2580000003 HC RX 258: Performed by: STUDENT IN AN ORGANIZED HEALTH CARE EDUCATION/TRAINING PROGRAM

## 2022-01-03 PROCEDURE — 70450 CT HEAD/BRAIN W/O DYE: CPT

## 2022-01-03 PROCEDURE — 98960 EDU&TRN PT SELF-MGMT NQHP 1: CPT

## 2022-01-03 PROCEDURE — 96375 TX/PRO/DX INJ NEW DRUG ADDON: CPT

## 2022-01-03 RX ORDER — 0.9 % SODIUM CHLORIDE 0.9 %
1000 INTRAVENOUS SOLUTION INTRAVENOUS ONCE
Status: COMPLETED | OUTPATIENT
Start: 2022-01-03 | End: 2022-01-03

## 2022-01-03 RX ORDER — DIPHENHYDRAMINE HYDROCHLORIDE 50 MG/ML
25 INJECTION INTRAMUSCULAR; INTRAVENOUS ONCE
Status: COMPLETED | OUTPATIENT
Start: 2022-01-03 | End: 2022-01-03

## 2022-01-03 RX ORDER — PROCHLORPERAZINE EDISYLATE 5 MG/ML
10 INJECTION INTRAMUSCULAR; INTRAVENOUS ONCE
Status: COMPLETED | OUTPATIENT
Start: 2022-01-03 | End: 2022-01-03

## 2022-01-03 RX ORDER — IPRATROPIUM BROMIDE AND ALBUTEROL SULFATE 2.5; .5 MG/3ML; MG/3ML
1 SOLUTION RESPIRATORY (INHALATION) ONCE
Status: COMPLETED | OUTPATIENT
Start: 2022-01-03 | End: 2022-01-03

## 2022-01-03 RX ORDER — ONDANSETRON 4 MG/1
4 TABLET, ORALLY DISINTEGRATING ORAL EVERY 8 HOURS PRN
Qty: 21 TABLET | Refills: 0 | Status: ON HOLD | OUTPATIENT
Start: 2022-01-03 | End: 2022-01-12

## 2022-01-03 RX ORDER — DEXAMETHASONE SODIUM PHOSPHATE 10 MG/ML
10 INJECTION INTRAMUSCULAR; INTRAVENOUS ONCE
Status: COMPLETED | OUTPATIENT
Start: 2022-01-03 | End: 2022-01-03

## 2022-01-03 RX ORDER — BENZONATATE 100 MG/1
100 CAPSULE ORAL ONCE
Status: COMPLETED | OUTPATIENT
Start: 2022-01-03 | End: 2022-01-03

## 2022-01-03 RX ORDER — ALBUTEROL SULFATE 2.5 MG/3ML
2.5 SOLUTION RESPIRATORY (INHALATION) EVERY 6 HOURS PRN
Status: DISCONTINUED | OUTPATIENT
Start: 2022-01-03 | End: 2022-01-04 | Stop reason: HOSPADM

## 2022-01-03 RX ADMIN — IOPAMIDOL 90 ML: 755 INJECTION, SOLUTION INTRAVENOUS at 21:13

## 2022-01-03 RX ADMIN — IPRATROPIUM BROMIDE AND ALBUTEROL SULFATE 1 AMPULE: .5; 3 SOLUTION RESPIRATORY (INHALATION) at 19:29

## 2022-01-03 RX ADMIN — ALBUTEROL SULFATE 10 MG: 5 SOLUTION RESPIRATORY (INHALATION) at 19:30

## 2022-01-03 RX ADMIN — BENZONATATE 100 MG: 100 CAPSULE ORAL at 19:05

## 2022-01-03 RX ADMIN — DEXAMETHASONE SODIUM PHOSPHATE 10 MG: 10 INJECTION INTRAMUSCULAR; INTRAVENOUS at 19:06

## 2022-01-03 RX ADMIN — PROCHLORPERAZINE EDISYLATE 10 MG: 5 INJECTION INTRAMUSCULAR; INTRAVENOUS at 19:07

## 2022-01-03 RX ADMIN — SODIUM CHLORIDE 1000 ML: 9 INJECTION, SOLUTION INTRAVENOUS at 19:05

## 2022-01-03 RX ADMIN — DIPHENHYDRAMINE HYDROCHLORIDE 25 MG: 50 INJECTION, SOLUTION INTRAMUSCULAR; INTRAVENOUS at 19:06

## 2022-01-03 ASSESSMENT — VISUAL ACUITY
OS: 20/25
OD: 20/25
OU: 20/20

## 2022-01-03 ASSESSMENT — ENCOUNTER SYMPTOMS
EYE REDNESS: 0
EYE PAIN: 0
COUGH: 1
SHORTNESS OF BREATH: 1
RHINORRHEA: 0

## 2022-01-03 NOTE — CARE COORDINATION
Patient contacted regarding COVID-19 diagnosis. Discussed COVID-19 related testing which was patient had done outside of facility at this time. Test results were positive. Patient informed of results, if available? Yes. Ambulatory Care Manager contacted the patient by telephone to perform post discharge assessment. Call within 2 business days of discharge: Yes. Verified name and  with patient as identifiers. Provided introduction to self, and explanation of the CTN/ACM role, and reason for call due to risk factors for infection and/or exposure to COVID-19. Symptoms reviewed with patient who verbalized the following symptoms: fever, pain or aching joints, cough, shortness of breath, sweating, no new symptoms, no worsening symptoms and headache. Due to no new or worsening symptoms encounter was not routed to provider for escalation. Discussed follow-up appointments. If no appointment was previously scheduled, appointment scheduling offered: No.  [facility] follow up appointment(s): No future appointments. Non-[facility] follow up appointment(s): patient encouraged to call in AM to schedule a follow up appt    Non-face-to-face services provided:  Obtained and reviewed discharge summary and/or continuity of care documents  Education of patient/family/caregiver/guardian to support self-management-isolation reviewed; symptom management  Assessment and support for treatment adherence and medication management-reviewed medication- dextromethorphan-guaifenesin     Advance Care Planning:   Does patient have an Advance Directive:  not on file. Educated patient about risk for severe COVID-19 due to risk factors according to CDC guidelines. ACM reviewed discharge instructions, medical action plan and red flag symptoms with the patient who verbalized understanding. Discussed COVID vaccination status: No. Education provided on COVID-19 vaccination as appropriate.  Discussed exposure protocols and quarantine with CDC Guidelines. Patient was given an opportunity to verbalize any questions and concerns and agrees to contact ACM or health care provider for questions related to their healthcare. Reviewed and educated patient on any new and changed medications related to discharge diagnosis     Was patient discharged with a pulse oximeter? No Discussed and confirmed pulse oximeter discharge instructions and when to notify provider or seek emergency care. ACM provided contact information. Plan for follow-up call in 5-7 days based on severity of symptoms and risk factors. Dhruv Hinton states she feels about the same. Has a bad headache, fever, and cough. She is taking the cough syrup which is working better than the [employer]. She is drinking lots of fluids. States she has some pain in her chest but not sure if it is muscle or coughing too much. Says the medication is not helping the headache. Encouraged alternating Tylenol and Ibuprofen to help manage the headache and fever. Reviewed S/S of when to return to the ED. Encouraged follow up with provider.

## 2022-01-03 NOTE — ED PROVIDER NOTES
[facility]  ED  Emergency Department Encounter  Emergency Medicine Resident     Pt Name: Charles Alvarado  MRN: [id number]  [place] 1975  Date of evaluation: 1/3/22  PCP:  MD Semaj Pineda Standard       Chief Complaint   Patient presents with    Dizziness       HISTORY [place]  (Location/Symptom, Timing/Onset, Context/Setting, Quality, Duration, Modifying Factors,Severity.)      Charles Alvarado is a 42 y. o.yo female who presents with dizziness, headache, vision changes. Patient states she is on approximately day 10 of a Covid infection. States she has been seen multiple times for different symptoms throughout her Covid illness. Most recently seen yesterday. However today when she woke up she noted that she had a left frontal headache. She associated visual changes and is noted that she is seeing a gray J shape only out of her left eye. Denies any double vision. Denies any prior history of migraine headaches. States she has had headaches in the past however this is different. Denies any photophobia or sound sensitivity. Also reports dizziness, worse for the past hour. Has difficulty describing this however states it feels like her head is floating and she is intoxicated. Denies any drug or alcohol use today. States she has been feeling off balance and leaning more toward the left. States the dizziness does not change with head movement. States she has never felt anything like this before. PAST MEDICAL / SURGICAL / SOCIAL / FAMILY HISTORY      has a past medical history of Heart palpitations and Hypothyroidism. has a past surgical history that includes  section; Dilation and curettage of uterus; Breast enhancement surgery; Hand surgery (Right); and Umbilical hernia repair (N/A, 3/7/2019).      Social History     Socioeconomic History    Marital status: Single     Spouse name: Not on file    Number of children: Not on file    Years of education: Not on file    Highest education level: Not on file   Occupational History    Not on file   Tobacco Use    Smoking status: Former Smoker     Types: Cigarettes    Smokeless tobacco: Never Used    Tobacco comment: ''Lots of 2nd hand smoke''   Vaping Use    Vaping Use: Some days   Substance and Sexual Activity    Alcohol use: No    Drug use: No    Sexual activity: Yes     Partners: Male   Other Topics Concern    Not on file   Social History Narrative    Not on file     Social Determinants of Health     Financial Resource Strain:     Difficulty of Paying Living Expenses: Not on file   Food Insecurity:     Worried About Running Out of Food in the Last Year: Not on file    Adam of Food in the Last Year: Not on file   Transportation Needs:     Lack of Transportation (Medical): Not on file    Lack of Transportation (Non-Medical):  Not on file   Physical Activity:     Days of Exercise per Week: Not on file    Minutes of Exercise per Session: Not on file   Stress:     Feeling of Stress : Not on file   Social Connections:     Frequency of Communication with Friends and Family: Not on file    Frequency of Social Gatherings with Friends and Family: Not on file    Attends Mu-ism Services: Not on file    Active Member of [address] [place] [address] [employer] or Organizations: Not on file    Attends Club or Organization Meetings: Not on file    Marital Status: Not on file   Intimate Partner Violence:     Fear of Current or Ex-Partner: Not on file    Emotionally Abused: Not on file    Physically Abused: Not on file    Sexually Abused: Not on file   Housing Stability:     Unable to Pay for Housing in the Last Year: Not on file    Number of [place] in the Last Year: Not on file    Unstable Housing in the Last Year: Not on file       Family History   Problem Relation Age of Onset    Diabetes Other         Allergies:  Bee venom and Percocet [oxycodone-acetaminophen]    Home Medications:  Prior to Admission medications    Medication Sig Start Date End Date Taking? Authorizing Provider   ondansetron (ZOFRAN ODT) 4 MG disintegrating tablet Take 1 tablet by mouth every 8 hours as needed for Nausea or Vomiting 1/3/22 1/10/22 Yes Vanessa Flannery MD   guaiFENesin-dextromethorphan (ROBITUSSIN DM) 100-10 MG/5ML syrup Take 5 mLs by mouth 3 times daily as needed for Cough 1/2/22 1/12/22  Maui Betters, DO   benzonatate (TESSALON) 100 MG capsule Take 1 capsule by mouth every 8 hours as needed for Cough 12/31/21 1/7/22  Nikki Gopal, DO   pantoprazole (PROTONIX) 20 MG tablet Take 2 tablets by mouth daily 1/2/21   Shanelle Ortega, DO   ibuprofen (ADVIL;MOTRIN) 800 MG tablet Take 1 tablet by mouth every 8 hours as needed for Pain  Patient not taking: Reported on 9/30/2019 8/8/19   Shirlene Cabello MD   cephALEXin (KEFLEX) 500 MG capsule Take 500 mg by mouth 3 times daily 3/31/19   Historical Provider, MD   sulfamethoxazole-trimethoprim (BACTRIM DS;SEPTRA DS) 800-160 MG per tablet Take 1 tablet by mouth 2 times daily 3/31/19   Historical Provider, MD   Multiple Vitamins-Minerals (THERAPEUTIC MULTIVITAMIN-MINERALS) tablet Take 1 tablet by mouth daily    Historical Provider, MD   ibuprofen (ADVIL;MOTRIN) 800 MG tablet Take 1 tablet by mouth every 8 hours as needed for Pain or Fever 3/7/19 3/17/19  Margarete Scheuermann, MD   levothyroxine (SYNTHROID) 200 MCG tablet Take 0.5 tablets by mouth Daily. Patient taking differently:   Take 200 mcg by mouth Daily  7/26/14   See-Yin So, DO       REVIEW OFSYSTEMS    (2-9 systems for level 4, 10 or more for level 5)      Review of Systems   Constitutional: Negative for chills and fever. HENT: Positive for congestion. Negative for rhinorrhea. Eyes: Negative for pain and redness. Respiratory: Positive for cough and shortness of breath. Cardiovascular: Negative for chest pain and palpitations. Genitourinary: Negative for difficulty urinating and dysuria.    Musculoskeletal: Negative for arthralgias, joint swelling, myalgias and neck pain. Skin: Negative for rash and wound. Neurological: Positive for dizziness and headaches. Negative for tremors, seizures, syncope, facial asymmetry, speech difficulty, weakness, light-headedness and numbness. Psychiatric/Behavioral: Negative for behavioral problems and confusion. PHYSICAL EXAM   (up to 7 for level 4, 8 or more forlevel 5)      INITIAL VITALS:   ED Triage Vitals [01/03/22 1818]   BP Temp Temp src Pulse Resp SpO2 Height Weight   -- -- -- -- -- 96 % -- --       Physical Exam  Vitals reviewed. Constitutional:       General: She is not in acute distress. Appearance: Normal appearance. She is not ill-appearing. HENT:      Head: Normocephalic and atraumatic. Right Ear: External ear normal.      Left Ear: External ear normal.      Nose: Congestion present. Mouth/Throat:      Mouth: Mucous membranes are moist.      Pharynx: No oropharyngeal exudate or posterior oropharyngeal erythema. Eyes:      General:         Right eye: No discharge. Left eye: No discharge. Extraocular Movements: Extraocular movements intact. Pupils: Pupils are equal, round, and reactive to light. Cardiovascular:      Rate and Rhythm: Normal rate and regular rhythm. Pulses: Normal pulses. Heart sounds: No murmur heard. Pulmonary:      Effort: Pulmonary effort is normal. No respiratory distress. Abdominal:      General: There is no distension. Palpations: Abdomen is soft. Tenderness: There is no abdominal tenderness. Musculoskeletal:         General: No swelling or deformity. Normal range of motion. Cervical back: Normal range of motion. No rigidity. Skin:     General: Skin is warm. Capillary Refill: Capillary refill takes less than 2 seconds. Neurological:      General: No focal deficit present. Mental Status: She is alert and oriented to person, place, and time. Cranial Nerves:  No cranial nerve deficit. Comments: Strength 5/5 in bilateral upper and lower extremities. Sensation intact. Finger-nose testing performed without difficulty. No slurred speech. No facial droop. No pronator drift. Psychiatric:         Mood and Affect: Mood normal.         Behavior: Behavior normal.         DIFFERENTIAL  DIAGNOSIS     PLAN (LABS / IMAGING / EKG):  Orders Placed This Encounter   Procedures    CT Head WO Contrast    CTA HEAD NECK W CONTRAST    Visual acuity screening    Inpatient Consult to Endovascular Neurosurgery    Respiratory Care Evaluation and Treat       MEDICATIONS ORDERED:  Orders Placed This Encounter   Medications    dexamethasone (DECADRON) injection 10 mg    diphenhydrAMINE (BENADRYL) injection 25 mg    prochlorperazine (COMPAZINE) injection 10 mg    0.9 % sodium chloride bolus    benzonatate (TESSALON) capsule 100 mg    ipratropium-albuterol (DUONEB) nebulizer solution 1 ampule     Order Specific Question:   Initiate RT Bronchodilator Protocol     Answer: Yes    DISCONTD: albuterol (PROVENTIL) nebulizer solution 2.5 mg     Order Specific Question:   Initiate RT Bronchodilator Protocol     Answer: Yes    albuterol (PROVENTIL) nebulizer solution 10 mg     Order Specific Question:   Initiate RT Bronchodilator Protocol     Answer: Yes    iopamidol (ISOVUE-370) 76 % injection 90 mL    ondansetron (ZOFRAN ODT) 4 MG disintegrating tablet     Sig: Take 1 tablet by mouth every 8 hours as needed for Nausea or Vomiting     Dispense:  21 tablet     Refill:  0       DDX: Covid, migraine headache, atypical headache, retinal detachment, stroke    Initial MDM/Plan: 42 y.o. female who presents with dizziness, headache, visual changes. Patient appears nontoxic on exam, afebrile, vital signs stable. Chest x-ray and labs reviewed from yesterday do not feel the need to repeat at this time.   Will perform ultrasound of left eye to evaluate for any retinal detachment or other Grey-white differentiation is maintained. No evidence of mass, mass effect or midline shift. No evidence of hydrocephalus. ORBITS: The visualized portion of the orbits demonstrate no acute abnormality. SINUSES:  The visualized paranasal sinuses and mastoid air cells demonstrate no acute abnormality. SOFT TISSUES/SKULL: No acute abnormality of the visualized skull or soft tissues. CTA NECK: AORTIC ARCH/ARCH VESSELS: No dissection or arterial injury. No significant stenosis of the brachiocephalic or subclavian arteries. CAROTID ARTERIES: No dissection, arterial injury, or hemodynamically significant stenosis by NASCET criteria. VERTEBRAL ARTERIES: No dissection, arterial injury, or significant stenosis. SOFT TISSUES: Scattered areas of patchy and ground-glass opacities are noted at the bilateral pulmonary apices. These are nonspecific but could indicate an atypical/viral pneumonia, inclusive of COVID-19. No cervical or superior mediastinal lymphadenopathy. The larynx and pharynx are unremarkable. No acute abnormality of the salivary and thyroid glands. BONES: No acute osseous abnormality. 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 No significant stenosis of the intracranial internal carotid, anterior cerebral, or middle cerebral arteries. No aneurysm. POSTERIOR CIRCULATION: No significant stenosis of the vertebral, basilar, or posterior cerebral arteries. No aneurysm. OTHER: No dural venous sinus thrombosis on this non-dedicated study. 1. No acute intracranial abnormality. 2. 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 3. No evidence of large vessel occlusion or hemodynamically significant stenosis involving the head and neck arteries. 4. Scattered areas of patchy and ground-glass opacities are noted at the bilateral pulmonary apices.  These are nonspecific but could indicate an atypical/viral pneumonia, inclusive of COVID-19. CTA HEAD NECK W CONTRAST    Result Date: 1/3/2022  EXAMINATION: CTA OF THE HEAD AND NECK WITH CONTRAST; CT OF THE HEAD WITHOUT CONTRAST 1/3/2022 9:00 pm: TECHNIQUE: CTA of the head and neck was performed with the administration of intravenous contrast. Multiplanar reformatted images are provided for review. MIP images are provided for review. Stenosis of the internal carotid arteries measured using NASCET criteria. Dose modulation, iterative reconstruction, and/or weight based adjustment of the mA/kV was utilized to reduce the radiation dose to as low as reasonably achievable.; CT of the head was performed without the administration of intravenous contrast. Dose modulation, iterative reconstruction, and/or weight based adjustment of the mA/kV was utilized to reduce the radiation dose to as low as reasonably achievable. Noncontrast CT of the head with reconstructed 2-D images are also provided for review. COMPARISON: None. HISTORY: ORDERING SYSTEM PROVIDED HISTORY: dizziness TECHNOLOGIST PROVIDED HISTORY: dizziness Decision Support Exception - unselect if not a suspected or confirmed emergency medical condition->Emergency Medical Condition (MA); ORDERING SYSTEM PROVIDED HISTORY: dizziness TECHNOLOGIST PROVIDED HISTORY: dizziness Decision Support Exception - unselect if not a suspected or confirmed emergency medical condition->Emergency Medical Condition (MA) Is the patient pregnant?->No FINDINGS: CT HEAD: BRAIN/VENTRICLES:  No acute intracranial hemorrhage or extraaxial fluid collection. Grey-white differentiation is maintained. No evidence of mass, mass effect or midline shift. No evidence of hydrocephalus. ORBITS: The visualized portion of the orbits demonstrate no acute abnormality. SINUSES:  The visualized paranasal sinuses and mastoid air cells demonstrate no acute abnormality.  SOFT TISSUES/SKULL: No acute abnormality of the visualized skull or soft tissues. CTA NECK: AORTIC ARCH/ARCH VESSELS: No dissection or arterial injury. No significant stenosis of the brachiocephalic or subclavian arteries. CAROTID ARTERIES: No dissection, arterial injury, or hemodynamically significant stenosis by NASCET criteria. VERTEBRAL ARTERIES: No dissection, arterial injury, or significant stenosis. SOFT TISSUES: Scattered areas of patchy and ground-glass opacities are noted at the bilateral pulmonary apices. These are nonspecific but could indicate an atypical/viral pneumonia, inclusive of COVID-19. No cervical or superior mediastinal lymphadenopathy. The larynx and pharynx are unremarkable. No acute abnormality of the salivary and thyroid glands. BONES: No acute osseous abnormality. 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 No significant stenosis of the intracranial internal carotid, anterior cerebral, or middle cerebral arteries. No aneurysm. POSTERIOR CIRCULATION: No significant stenosis of the vertebral, basilar, or posterior cerebral arteries. No aneurysm. OTHER: No dural venous sinus thrombosis on this non-dedicated study. 1. No acute intracranial abnormality. 2. 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 3. No evidence of large vessel occlusion or hemodynamically significant stenosis involving the head and neck arteries. 4. Scattered areas of patchy and ground-glass opacities are noted at the bilateral pulmonary apices. These are nonspecific but could indicate an atypical/viral pneumonia, inclusive of COVID-19.        EKG  None    All EKG's are interpreted by the Emergency Department Physicianwho either signs or Co-signs this chart in the absence of a cardiologist.    EMERGENCY DEPARTMENT COURSE:  ED Course as of 01/04/22 1210   Mon Jan 03, 2022 1926 Visual acuity 25/20 in left eye, 25/20 in right eye, 20/20 in both

## 2022-01-03 NOTE — CARE COORDINATION
Received call back from Jose, stating that she just got up to the bathroom and was feeling dizzy/lightheaded and \"funny\". She couldn't really explain how she felt. Encouraged her to go to the ED to be evaluated due to symptoms.

## 2022-01-03 NOTE — ED PROVIDER NOTES
[facility]  Emergency Department  Faculty Attestation     I performed a history and physical examination of the patient and discussed management with the resident. I reviewed the residents note and agree with the documented findings and plan of care. Any areas of disagreement are noted on the chart. I was personally present for the key portions of any procedures. I have documented in the chart those procedures where I was not present during the key portions. I have reviewed the emergency nurses triage note. I agree with the chief complaint, past medical history, past surgical history, allergies, medications, social and family history as documented unless otherwise noted below. For Physician Assistant/ Nurse Practitioner cases/documentation I have personally evaluated this patient and have completed at least one if not all key elements of the E/M (history, physical exam, and MDM). Additional findings are as noted. Primary Care Physician:  Lizz Pfeiffer MD    Screenings:  [unfilled]    CHIEF COMPLAINT       Chief Complaint   Patient presents with    Dizziness       RECENT VITALS:   Temp: 97.6 °F (36.4 °C),  Pulse: 81, Resp: 18, BP: 98/74    LABS:  Labs Reviewed - No data to display    Radiology  CT Head WO Contrast    (Results Pending)   CTA HEAD NECK W CONTRAST    (Results Pending)       CRITICAL CARE: There was a high probability of clinically significant/life threatening deterioration in this patient's condition which required my urgent intervention. Total critical care time was none minutes. This excludes any time for separately reportable procedures. EKG:      Attending Physician Additional  Notes    Patient has multiple Covid symptoms since before [place] with loss of taste, myalgias, coughing, mild difficulty breathing, intermittent vomiting.   Today she presents with an unusual headache for her, slightly worse than normal, onset yesterday, gradual, left frontal, associated with left eye blurring and a J-shaped. No true photophobia or phonophobia. No more nausea today than previously. No stiff neck. There is intermittent fevers. She felt lightheaded with walking and perhaps having vertigo, and fell to the left. No presyncope during this. She has persistent dry cough with deep breathing. She was seen here yesterday and diagnosed as Covid pneumonia. No history of migraines. No stiff neck. On exam she appears uncomfortable but nontoxic afebrile vital signs reveal borderline blood pressure otherwise normal.  Neck is supple, negative Kernig's, able to touch chin to chest with knees extended and hips flexed, negative jolt maneuver. Normal speech and mentation. Normal pupils and extraocular movements. Gaze is conjugate. No nystagmus noted. Normal finger-nose movements and fine for past repetitive movements. Lungs are clear but deep breathing reproduces persistent dry cough. Impression is migraine type headache rule out subarachnoid hemorrhage though my suspicion is low, low suspicion for stroke, assess for retinal detachment or vitreous detachment, probable bronchospasm, possible volume depletion. Plan is visual acuity, IV access, fluids, Compazine, Benadryl, Decadron for migraine, orbital ultrasound of the left eye, reviewed chest x-ray and labs from yesterday, CT brain and CT angiogram head/neck, reassess. Will assess gait before discharge. If any neurologic abnormalities or abnormal CT will consult neurology. Robles Faustin.  Danielle Joseph MD, [address]  Attending Emergency  Physician               Praveen Finley MD  01/03/22 5925

## 2022-01-04 ENCOUNTER — CARE COORDINATION (OUTPATIENT)
Dept: CARE COORDINATION | Age: 47
End: 2022-01-04

## 2022-01-04 ENCOUNTER — HOSPITAL ENCOUNTER (EMERGENCY)
Age: 47
Discharge: LEFT BEFORE DISCHARGE | End: 2022-01-04

## 2022-01-04 VITALS
HEIGHT: 65 IN | SYSTOLIC BLOOD PRESSURE: 106 MMHG | WEIGHT: 155 LBS | RESPIRATION RATE: 18 BRPM | TEMPERATURE: 98.4 F | BODY MASS INDEX: 25.83 KG/M2 | HEART RATE: 86 BPM | DIASTOLIC BLOOD PRESSURE: 70 MMHG | OXYGEN SATURATION: 96 %

## 2022-01-04 ASSESSMENT — PAIN DESCRIPTION - FREQUENCY: FREQUENCY: CONTINUOUS

## 2022-01-04 ASSESSMENT — PAIN DESCRIPTION - DESCRIPTORS: DESCRIPTORS: ACHING;SHARP

## 2022-01-04 ASSESSMENT — PAIN SCALES - GENERAL: PAINLEVEL_OUTOF10: 4

## 2022-01-04 ASSESSMENT — PAIN DESCRIPTION - LOCATION: LOCATION: HEAD

## 2022-01-04 NOTE — CONSULTS
Endovascular Neurosurgery Consult    Pt Name: Jaylin Stubbs  MRN: [id number]  [place]: 1975  Date of evaluation: 1/3/2022  Primary Care Physician: Lyssa Vogel MD  Patient evaluated at the request of  Dr. Flo Lawrence  Reason for evaluation: Incidental 2 mm aneurysm    SUBJECTIVE:   History of Chief Complaint:    Jaylin Stubbs is a 42 y.o. female who presents with headache, dizziness. Patient has a past medical history of Covid infection diagnosed 10 days ago and hypothyroidism. Patient evaluated in the ER for left frontal HA associated with a visual aura which she describes as gray J-shaped out of left eye. Patient was treated with migraine cocktail. Neuro endovascular consulted for CTA head which revealed incidental 2 mm aneurysm at the junction of the left anterior cerebral and anterior communicating arteries. CT head negative for acute intracranial normality. Patient back to baseline, no plan for admission at this time. Plan for outpatient follow-up with endovascular neurosurgery. Allergies  is allergic to bee venom and percocet [oxycodone-acetaminophen]. Medications  Prior to Admission medications    Medication Sig Start Date End Date Taking?  Authorizing Provider   guaiFENesin-dextromethorphan (ROBITUSSIN DM) 100-10 MG/5ML syrup Take 5 mLs by mouth 3 times daily as needed for Cough 1/2/22 1/12/22  Maureen Hyatt DO   benzonatate (TESSALON) 100 MG capsule Take 1 capsule by mouth every 8 hours as needed for Cough 12/31/21 1/7/22  Joy Mobley DO   pantoprazole (PROTONIX) 20 MG tablet Take 2 tablets by mouth daily 1/2/21   Saray Sheffield DO   ibuprofen (ADVIL;MOTRIN) 800 MG tablet Take 1 tablet by mouth every 8 hours as needed for Pain  Patient not taking: Reported on 9/30/2019 8/8/19   Leticia Healy MD   cephALEXin (KEFLEX) 500 MG capsule Take 500 mg by mouth 3 times daily 3/31/19   Historical Provider, MD   sulfamethoxazole-trimethoprim (BACTRIM DS;SEPTRA DS) 800-160 MG per tablet Take 1 tablet by mouth 2 times daily 3/31/19   Historical Provider, MD   Multiple Vitamins-Minerals (THERAPEUTIC MULTIVITAMIN-MINERALS) tablet Take 1 tablet by mouth daily    Historical Provider, MD   ibuprofen (ADVIL;MOTRIN) 800 MG tablet Take 1 tablet by mouth every 8 hours as needed for Pain or Fever 3/7/19 3/17/19  John Pereira MD   levothyroxine (SYNTHROID) 200 MCG tablet Take 0.5 tablets by mouth Daily. Patient taking differently:   Take 200 mcg by mouth Daily  14   See-Yin So, DO    Scheduled Meds:  Continuous Infusions:  PRN Meds:.albuterol  Past Medical History   has a past medical history of Heart palpitations and Hypothyroidism. Past Surgical History   has a past surgical history that includes  section; Dilation and curettage of uterus; Breast enhancement surgery; Hand surgery (Right); and Umbilical hernia repair (N/A, 3/7/2019). Social History   reports that she has quit smoking. Her smoking use included cigarettes. She has never used smokeless tobacco.   reports no history of alcohol use. reports no history of drug use. Family History  family history includes Diabetes in an other family member. Review of Systems:  CONSTITUTIONAL:  negative for fevers, chills, fatigue and malaise    EYES:  negative for double vision, blurred vision and photophobia     HEENT:  negative for tinnitus, epistaxis and sore throat    RESPIRATORY:  negative for cough, shortness of breath, wheezing    CARDIOVASCULAR:  negative for chest pain, palpitations, syncope, edema    GASTROINTESTINAL:  negative for nausea, vomiting    GENITOURINARY:  negative for incontinence    MUSCULOSKELETAL:  negative for neck or back pain    NEUROLOGICAL:  Negative for weakness and tingling  Positive for HA    PSYCHIATRIC:  negative for anxiety      Review of systems otherwise negative.       OBJECTIVE:   Vitals: BP 98/74   Pulse 81   Temp 97.6 °F (36.4 °C)   Resp 18   Ht 5' 5\" (1.651 m)   Wt 155 lb (70.3 kg)   SpO2 95%   BMI 25.79 kg/m²   General appearance: Lying in bed, NAD,  HEENT: Head: Normocephalic, no lesions, without obvious abnormality. Neck: no adenopathy, no carotid bruit, no JVD, supple, symmetrical, trachea midline and thyroid not enlarged, symmetric, no tenderness/mass/nodules  Lungs: clear to auscultation bilaterally  Heart: regular rate and rhythm, S1, S2 normal, no murmur, click, rub or gallop  Abdomen: soft, non-tender; nondistended, bowel sounds normal  Extremities: extremities normal, atraumatic, no cyanosis or edema  Groin: groin c/d/i  Neurologic: Mental status: Alert and oriented x 3, intact language, attention, knowledge  CN:   2-12 intact    MOTOR:5/5 in b/l upper and lower extremity  Normal muscle bulk. No increased tone  SENSORY: Symmetric to touch  COORDINATION: No dysmetria or dysdiadochocinesia       LABS:     Recent Labs     01/02/22  0125   WBC 5.2   HGB 11.2*   HCT 34.3*      *   K 3.3*   CL 98   CO2 21   BUN 7   CREATININE 0.72   CALCIUM 8.1*   AST 26   ALT 13   BILITOT 0.19*     Recent Labs     01/02/22  0125   ALKPHOS 47   ALT 13   AST 26   BILITOT 0.19*   LABALBU 3.8       RADIOLOGY:   Images were personally reviewed including:  CT brain without contrast:   1. No acute intracranial abnormality. .   CTA/:  2. 2 mm aneurysm at the junction of the left anterior cerebral and anterior   communicating arteries. 3. No evidence of large vessel occlusion or hemodynamically significant   stenosis involving the head and neck arteries      IMPRESSIONS:     1. Incidental 2 mm aneurysm at junction of left DIPTI and ACom  2. Migraine HA   PLANS:   1. Recommend outpatient follow-up with endovascular neurosurgery for future angiography  2. No acute neuro endovascular intervention planned for now     Thank you for the interesting evaluation. Further recommendations to follow.     Consultation Visit Time:  30 minutes    Celsa Diallo MD, MD  Pager [phone]  Stroke, [facility]  [address] [place]  Electronically signed 1/3/2022 at 11:10 PM

## 2022-01-04 NOTE — ED NOTES
This patient was assessed by the doctor only. Nurse processed and completed the orders from this doctor ie labs, meds, and/or EKG.       [address] [place] [address]  01/03/22 3495

## 2022-01-04 NOTE — CARE COORDINATION
Patient contacted regarding COVID-19 diagnosis. Discussed COVID-19 related testing which was available at this time. Test results were positive. Patient informed of results, if available? Yes    Ambulatory Care Manager contacted the patient by telephone to perform follow-up assessment. Verified name and  with patient as identifiers. Patient has following risk factors of: pneumonia. Symptoms reviewed with patient who verbalized the following symptoms: no new symptoms, no worsening symptoms and headache; left eye blurry vision. Due to no new or worsening symptoms encounter was not routed to provider for escalation. Educated patient about risk for severe COVID-19 due to risk factors according to CDC guidelines. ACM reviewed discharge instructions, medical action plan and red flag symptoms with the patient who verbalized understanding. Discussed COVID vaccination status: No. Education provided on COVID-19 vaccination as appropriate. Discussed exposure protocols and quarantine with CDC Guidelines. Patient was given an opportunity to verbalize any questions and concerns and agrees to contact ACM or health care provider for questions related to their healthcare. Was patient discharged with a pulse oximeter? No Discussed and confirmed pulse oximeter discharge instructions and when to notify provider or seek emergency care. ACM provided contact information. Plan for follow-up call in 5-7 days based on severity of symptoms and risk factors. Jose went back to the ED yesterday for her symptoms. They found a cerebral aneurysm. She is to follow up with endovascular neurology on . She states her fever is staying down today; she is able to eat and drink ok. She has been cleaning her home. Encouraged her to take it easy and pace herself; take frequent rest periods. She said she is still having the headache and blurry vision in her left eye.  She was told by the neurology office that she should go back to the ED due to the blurry vision. Encouraged her to follow their recommendations ; and to go for any changes in vision, headache, increased dizziness, or any new symptoms. Will follow up in 5-7 days.

## 2022-01-04 NOTE — ED PROVIDER NOTES
[facility]  ED  Emergency Department  Emergency Medicine Resident Sign-out     Care of Gerold Eisenmenger was assumed from Dr. Chico Hogan and is being seen for Dizziness  . The patient's initial evaluation and plan have been discussed with the prior provider who initially evaluated the patient. EMERGENCY DEPARTMENT COURSE / MEDICAL DECISION MAKING:       MEDICATIONS GIVEN:  Orders Placed This Encounter   Medications    dexamethasone (DECADRON) injection 10 mg    diphenhydrAMINE (BENADRYL) injection 25 mg    prochlorperazine (COMPAZINE) injection 10 mg    0.9 % sodium chloride bolus    benzonatate (TESSALON) capsule 100 mg    ipratropium-albuterol (DUONEB) nebulizer solution 1 ampule     Order Specific Question:   Initiate RT Bronchodilator Protocol     Answer: Yes    DISCONTD: albuterol (PROVENTIL) nebulizer solution 2.5 mg     Order Specific Question:   Initiate RT Bronchodilator Protocol     Answer: Yes    albuterol (PROVENTIL) nebulizer solution 10 mg     Order Specific Question:   Initiate RT Bronchodilator Protocol     Answer: Yes    iopamidol (ISOVUE-370) 76 % injection 90 mL    ondansetron (ZOFRAN ODT) 4 MG disintegrating tablet     Sig: Take 1 tablet by mouth every 8 hours as needed for Nausea or Vomiting     Dispense:  21 tablet     Refill:  0       LABS / RADIOLOGY:     Labs Reviewed - No data to display    XR CHEST PORTABLE    Result Date: 1/2/2022  EXAMINATION: ONE XRAY VIEW OF THE CHEST 1/2/2022 1:12 am COMPARISON: 01/02/2021. HISTORY: ORDERING SYSTEM PROVIDED HISTORY: sob cough TECHNOLOGIST PROVIDED HISTORY: sob cough Reason for Exam: Upright port, Cough, SOB FINDINGS: Cardiomediastinal silhouette appears within normal limits. Scattered bilateral patchy airspace opacities, most notable in the left mid and right lower lung zones. Costophrenic angles are sharp. No evidence of pneumothorax. No acute osseous abnormalities.      Scattered bilateral patchy airspace opacities, most notable in the left mid and right lower lung zones. Finding concerning for multifocal pneumonia, possibly COVID-19 pneumonia in the appropriate clinical setting. RECENT VITALS:     Temp: 97.6 °F (36.4 °C),  Pulse: 85, Resp: 18, BP: 121/68, SpO2: 95 %      This patient is a 42 y.o. Female with dizziness and headache in the context of Covid infection. Patient had migraine cocktail which mildly improved her symptoms. Awaiting CT head and CTA head and neck results, likely discharge. ---  CTA showing 2 mm aneurysm at the junction of the left anterior cerebral and anterior communicating arteries. Discussed with endovascular neurosurgery who recommends follow-up as an outpatient. Provided the patient with the phone number for the endovascular neurosurgery clinic to call for follow-up appointment. Patient was given return precautions      ED Course as of 01/07/22 0101 Mon Jan 03, 2022 1926 Visual acuity 25/20 in left eye, 25/20 in right eye, 20/20 in both eyes together [AB]   1930 Bedside also performed of left eye. No signs of vitreous hemorrhage or retinal detachment [AB]   1947 Reevaluated patient. States she is feeling better with her breathing. However still complaining of dizziness and vision changes, these are unchanged after receiving the medications [AB]   2200 Patient signed out to Dr. Santana March awaiting CT reads [AB]      ED Course User Index  [AB] Kristan Santiago DO       OUTSTANDING TASKS / RECOMMENDATIONS:    1. F/u CTA/CT head  2. Dispo     FINAL IMPRESSION:     1. Dizziness    2.  Cerebral arterial aneurysm        DISPOSITION:         DISPOSITION:  [x]  Discharge   []  Transfer -    []  Admission -     []  Against Medical Advice   []  Eloped   FOLLOW-UP: Bryan Penn MD  [address] [place] [address], [facility] [address]  MO[address] [facility]  [phone]    Schedule an appointment as soon as possible for a visit       [facility] ED  [address] [facility] [address]  [phone]  Go to   If symptoms worsen    [address] [facility]  [facility], [address] [place] [address], [facility]    Tel: [phone]    To establish primary care follow-up     DISCHARGE MEDICATIONS: Discharge Medication List as of 1/3/2022 11:38 PM      START taking these medications    Details   ondansetron (ZOFRAN ODT) 4 MG disintegrating tablet Take 1 tablet by mouth every 8 hours as needed for Nausea or Vomiting, Disp-21 tablet, R-0Normal                Maria Victoria Charles MD  Emergency Medicine Resident  [facility] [place]      Maria Victoria Charles MD  Resident  01/07/22 0110

## 2022-01-07 ENCOUNTER — TELEPHONE (OUTPATIENT)
Dept: NEUROSURGERY | Age: 47
End: 2022-01-07

## 2022-01-07 ENCOUNTER — APPOINTMENT (OUTPATIENT)
Dept: MRI IMAGING | Age: 47
DRG: 054 | End: 2022-01-07
Payer: COMMERCIAL

## 2022-01-07 ENCOUNTER — HOSPITAL ENCOUNTER (INPATIENT)
Age: 47
LOS: 5 days | Discharge: HOME | DRG: 054 | End: 2022-01-12
Attending: EMERGENCY MEDICINE | Admitting: PSYCHIATRY & NEUROLOGY
Payer: COMMERCIAL

## 2022-01-07 DIAGNOSIS — I67.1 CEREBRAL ANEURYSM: ICD-10-CM

## 2022-01-07 DIAGNOSIS — R51.9 ACUTE NONINTRACTABLE HEADACHE, UNSPECIFIED HEADACHE TYPE: Primary | ICD-10-CM

## 2022-01-07 DIAGNOSIS — I77.72 ILIAC ARTERY DISSECTION (HCC): ICD-10-CM

## 2022-01-07 PROBLEM — G43.909 MIGRAINE SYNDROME: Status: ACTIVE | Noted: 2022-01-07

## 2022-01-07 LAB
ABSOLUTE EOS #: 0.08 K/UL (ref 0–0.44)
ABSOLUTE IMMATURE GRANULOCYTE: 0.08 K/UL (ref 0–0.3)
ABSOLUTE LYMPH #: 1.93 K/UL (ref 1.1–3.7)
ABSOLUTE MONO #: 0.43 K/UL (ref 0.1–1.2)
ANION GAP SERPL CALCULATED.3IONS-SCNC: 11 MMOL/L (ref 9–17)
BASOPHILS # BLD: 0 % (ref 0–2)
BASOPHILS ABSOLUTE: <0.03 K/UL (ref 0–0.2)
BUN BLDV-MCNC: 6 MG/DL (ref 6–20)
BUN/CREAT BLD: ABNORMAL (ref 9–20)
CALCIUM SERPL-MCNC: 8.5 MG/DL (ref 8.6–10.4)
CHLORIDE BLD-SCNC: 103 MMOL/L (ref 98–107)
CO2: 24 MMOL/L (ref 20–31)
CREAT SERPL-MCNC: 0.61 MG/DL (ref 0.5–0.9)
DIFFERENTIAL TYPE: ABNORMAL
EOSINOPHILS RELATIVE PERCENT: 1 % (ref 1–4)
GFR AFRICAN AMERICAN: >60 ML/MIN
GFR NON-AFRICAN AMERICAN: >60 ML/MIN
GFR SERPL CREATININE-BSD FRML MDRD: ABNORMAL ML/MIN/{1.73_M2}
GFR SERPL CREATININE-BSD FRML MDRD: ABNORMAL ML/MIN/{1.73_M2}
GLUCOSE BLD-MCNC: 92 MG/DL (ref 70–99)
HCG QUALITATIVE: NEGATIVE
HCT VFR BLD CALC: 31 % (ref 36.3–47.1)
HEMOGLOBIN: 10 G/DL (ref 11.9–15.1)
IMMATURE GRANULOCYTES: 1 %
LYMPHOCYTES # BLD: 35 % (ref 24–43)
MCH RBC QN AUTO: 26.4 PG (ref 25.2–33.5)
MCHC RBC AUTO-ENTMCNC: 32.3 G/DL (ref 28.4–34.8)
MCV RBC AUTO: 81.8 FL (ref 82.6–102.9)
MONOCYTES # BLD: 8 % (ref 3–12)
NRBC AUTOMATED: 0 PER 100 WBC
PDW BLD-RTO: 15.3 % (ref 11.8–14.4)
PLATELET # BLD: 595 K/UL (ref 138–453)
PLATELET ESTIMATE: ABNORMAL
PMV BLD AUTO: 9.1 FL (ref 8.1–13.5)
POTASSIUM SERPL-SCNC: 3.5 MMOL/L (ref 3.7–5.3)
RBC # BLD: 3.79 M/UL (ref 3.95–5.11)
RBC # BLD: ABNORMAL 10*6/UL
SEG NEUTROPHILS: 55 % (ref 36–65)
SEGMENTED NEUTROPHILS ABSOLUTE COUNT: 3.05 K/UL (ref 1.5–8.1)
SODIUM BLD-SCNC: 138 MMOL/L (ref 135–144)
WBC # BLD: 5.6 K/UL (ref 3.5–11.3)
WBC # BLD: ABNORMAL 10*3/UL

## 2022-01-07 PROCEDURE — 2580000003 HC RX 258: Performed by: STUDENT IN AN ORGANIZED HEALTH CARE EDUCATION/TRAINING PROGRAM

## 2022-01-07 PROCEDURE — 99254 IP/OBS CNSLTJ NEW/EST MOD 60: CPT | Performed by: PSYCHIATRY & NEUROLOGY

## 2022-01-07 PROCEDURE — 6360000002 HC RX W HCPCS: Performed by: STUDENT IN AN ORGANIZED HEALTH CARE EDUCATION/TRAINING PROGRAM

## 2022-01-07 PROCEDURE — 70544 MR ANGIOGRAPHY HEAD W/O DYE: CPT

## 2022-01-07 PROCEDURE — 85025 COMPLETE CBC W/AUTO DIFF WBC: CPT

## 2022-01-07 PROCEDURE — 2060000000 HC ICU INTERMEDIATE R&B

## 2022-01-07 PROCEDURE — 70551 MRI BRAIN STEM W/O DYE: CPT

## 2022-01-07 PROCEDURE — 99283 EMERGENCY DEPT VISIT LOW MDM: CPT

## 2022-01-07 PROCEDURE — 96375 TX/PRO/DX INJ NEW DRUG ADDON: CPT

## 2022-01-07 PROCEDURE — 96361 HYDRATE IV INFUSION ADD-ON: CPT

## 2022-01-07 PROCEDURE — 84703 CHORIONIC GONADOTROPIN ASSAY: CPT

## 2022-01-07 PROCEDURE — 80048 BASIC METABOLIC PNL TOTAL CA: CPT

## 2022-01-07 PROCEDURE — 96374 THER/PROPH/DIAG INJ IV PUSH: CPT

## 2022-01-07 RX ORDER — PANTOPRAZOLE SODIUM 40 MG/1
40 TABLET, DELAYED RELEASE ORAL DAILY
Status: DISCONTINUED | OUTPATIENT
Start: 2022-01-07 | End: 2022-01-12 | Stop reason: HOSPADM

## 2022-01-07 RX ORDER — ACETAMINOPHEN 650 MG/1
650 SUPPOSITORY RECTAL EVERY 6 HOURS PRN
Status: DISCONTINUED | OUTPATIENT
Start: 2022-01-07 | End: 2022-01-12 | Stop reason: HOSPADM

## 2022-01-07 RX ORDER — SODIUM CHLORIDE 9 MG/ML
25 INJECTION, SOLUTION INTRAVENOUS PRN
Status: DISCONTINUED | OUTPATIENT
Start: 2022-01-07 | End: 2022-01-12 | Stop reason: HOSPADM

## 2022-01-07 RX ORDER — POLYETHYLENE GLYCOL 3350 17 G/17G
17 POWDER, FOR SOLUTION ORAL DAILY PRN
Status: DISCONTINUED | OUTPATIENT
Start: 2022-01-07 | End: 2022-01-12 | Stop reason: HOSPADM

## 2022-01-07 RX ORDER — 0.9 % SODIUM CHLORIDE 0.9 %
1000 INTRAVENOUS SOLUTION INTRAVENOUS ONCE
Status: COMPLETED | OUTPATIENT
Start: 2022-01-07 | End: 2022-01-07

## 2022-01-07 RX ORDER — ONDANSETRON 4 MG/1
4 TABLET, ORALLY DISINTEGRATING ORAL EVERY 8 HOURS PRN
Status: DISCONTINUED | OUTPATIENT
Start: 2022-01-07 | End: 2022-01-12 | Stop reason: HOSPADM

## 2022-01-07 RX ORDER — ACETAMINOPHEN 325 MG/1
650 TABLET ORAL EVERY 6 HOURS PRN
Status: DISCONTINUED | OUTPATIENT
Start: 2022-01-07 | End: 2022-01-12 | Stop reason: HOSPADM

## 2022-01-07 RX ORDER — ONDANSETRON 2 MG/ML
4 INJECTION INTRAMUSCULAR; INTRAVENOUS EVERY 6 HOURS PRN
Status: DISCONTINUED | OUTPATIENT
Start: 2022-01-07 | End: 2022-01-12 | Stop reason: HOSPADM

## 2022-01-07 RX ORDER — SODIUM CHLORIDE 0.9 % (FLUSH) 0.9 %
5-40 SYRINGE (ML) INJECTION EVERY 12 HOURS SCHEDULED
Status: DISCONTINUED | OUTPATIENT
Start: 2022-01-07 | End: 2022-01-12 | Stop reason: HOSPADM

## 2022-01-07 RX ORDER — SODIUM CHLORIDE 9 MG/ML
INJECTION, SOLUTION INTRAVENOUS CONTINUOUS
Status: DISCONTINUED | OUTPATIENT
Start: 2022-01-07 | End: 2022-01-11

## 2022-01-07 RX ORDER — PROCHLORPERAZINE EDISYLATE 5 MG/ML
10 INJECTION INTRAMUSCULAR; INTRAVENOUS ONCE
Status: COMPLETED | OUTPATIENT
Start: 2022-01-07 | End: 2022-01-07

## 2022-01-07 RX ORDER — BENZONATATE 100 MG/1
100 CAPSULE ORAL EVERY 8 HOURS PRN
Status: DISCONTINUED | OUTPATIENT
Start: 2022-01-07 | End: 2022-01-12 | Stop reason: HOSPADM

## 2022-01-07 RX ORDER — SODIUM CHLORIDE 0.9 % (FLUSH) 0.9 %
5-40 SYRINGE (ML) INJECTION PRN
Status: DISCONTINUED | OUTPATIENT
Start: 2022-01-07 | End: 2022-01-12 | Stop reason: HOSPADM

## 2022-01-07 RX ORDER — LEVOTHYROXINE SODIUM 0.1 MG/1
200 TABLET ORAL DAILY
Status: DISCONTINUED | OUTPATIENT
Start: 2022-01-08 | End: 2022-01-12 | Stop reason: HOSPADM

## 2022-01-07 RX ORDER — DIPHENHYDRAMINE HYDROCHLORIDE 50 MG/ML
25 INJECTION INTRAMUSCULAR; INTRAVENOUS EVERY 6 HOURS PRN
Status: DISCONTINUED | OUTPATIENT
Start: 2022-01-07 | End: 2022-01-12 | Stop reason: HOSPADM

## 2022-01-07 RX ADMIN — PROCHLORPERAZINE EDISYLATE 10 MG: 5 INJECTION INTRAMUSCULAR; INTRAVENOUS at 15:30

## 2022-01-07 RX ADMIN — SODIUM CHLORIDE: 9 INJECTION, SOLUTION INTRAVENOUS at 21:15

## 2022-01-07 RX ADMIN — DIPHENHYDRAMINE HYDROCHLORIDE 25 MG: 50 INJECTION, SOLUTION INTRAMUSCULAR; INTRAVENOUS at 15:30

## 2022-01-07 RX ADMIN — SODIUM CHLORIDE 1000 ML: 9 INJECTION, SOLUTION INTRAVENOUS at 15:30

## 2022-01-07 RX ADMIN — SODIUM CHLORIDE, PRESERVATIVE FREE 10 ML: 5 INJECTION INTRAVENOUS at 21:00

## 2022-01-07 ASSESSMENT — PAIN DESCRIPTION - ONSET: ONSET: ON-GOING

## 2022-01-07 ASSESSMENT — ENCOUNTER SYMPTOMS
CONSTIPATION: 0
ABDOMINAL PAIN: 0
SHORTNESS OF BREATH: 0
BACK PAIN: 0
DIARRHEA: 0
VOMITING: 0
COUGH: 0
NAUSEA: 0

## 2022-01-07 ASSESSMENT — PAIN SCALES - GENERAL
PAINLEVEL_OUTOF10: 10
PAINLEVEL_OUTOF10: 1

## 2022-01-07 ASSESSMENT — PAIN DESCRIPTION - PROGRESSION
CLINICAL_PROGRESSION: NOT CHANGED

## 2022-01-07 ASSESSMENT — PAIN DESCRIPTION - PAIN TYPE
TYPE: ACUTE PAIN
TYPE: ACUTE PAIN

## 2022-01-07 ASSESSMENT — PAIN DESCRIPTION - DESCRIPTORS: DESCRIPTORS: ACHING;DISCOMFORT

## 2022-01-07 ASSESSMENT — PAIN DESCRIPTION - LOCATION
LOCATION: HEAD
LOCATION: HEAD

## 2022-01-07 ASSESSMENT — PAIN DESCRIPTION - FREQUENCY: FREQUENCY: CONTINUOUS

## 2022-01-07 ASSESSMENT — PAIN - FUNCTIONAL ASSESSMENT: PAIN_FUNCTIONAL_ASSESSMENT: ACTIVITIES ARE NOT PREVENTED

## 2022-01-07 NOTE — ED NOTES
Pt. States she would like to go home after the MRI, Dr. Josefa mcclelland served      Declan Bliss, ALIN  01/07/22 3473

## 2022-01-07 NOTE — ED PROVIDER NOTES
[facility] ED  Emergency Department Encounter  EmergencyMedicine Resident     Pt [place]  MRN: [id number]  [place] 1975  Date of evaluation: 1/7/22  PCP:  Nadir Rivera MD    This patient was evaluated in the Emergency Department for symptoms described in the history of present illness. The patient was evaluated in the context of the global COVID-19 pandemic, which necessitated consideration that the patient might be at risk for infection with the SARS-CoV-2 virus that causes COVID-19. Institutional protocols and algorithms that pertain to the evaluation of patients at risk for COVID-19 are in a state of rapid change based on information released by regulatory bodies including the CDC and federal and state organizations. These policies and algorithms were followed during the patient's care in the ED. CHIEF COMPLAINT       Chief Complaint   Patient presents with    Headache     Hx of brain aneurysm       HISTORY OF PRESENT ILLNESS  (Location/Symptom, Timing/Onset, Context/Setting, Quality, Duration, Modifying Factors, Severity.)      Chandu Morris is a 42 y.o. female with new history of cerebral aneurysm who presents with worsening headache. Patient was recently evaluated in our emergency department for headache and a CT scan revealed 2 mm cerebral aneurysm. Patient was treated with migraine cocktail and discharged home after symptoms have resolved. Patient presents to the emergency department today stating that her symptoms have not resolved since her discharge and have worsened. States that she has a headache all over her head as well as a new sensation of pain on the superior aspect of her cranium. Patient has had a visual field defect in the left eye which is the same is her presentation earlier this week. Patient has not been able to follow-up outpatient with endovascular neurosurgery.   Denies fever/chills, cough, nausea/vomiting, chest pain, shortness of breath, abdominal pain, change in bowel or bladder function, numbness or tingling, focal weakness. PAST MEDICAL / SURGICAL / SOCIAL / FAMILY HISTORY      has a past medical history of Heart palpitations and Hypothyroidism. has a past surgical history that includes  section; Dilation and curettage of uterus; Breast enhancement surgery; Hand surgery (Right); and Umbilical hernia repair (N/A, 3/7/2019). Social History     Socioeconomic History    Marital status: Single     Spouse name: Not on file    Number of children: Not on file    Years of education: Not on file    Highest education level: Not on file   Occupational History    Not on file   Tobacco Use    Smoking status: Former Smoker     Types: Cigarettes    Smokeless tobacco: Never Used    Tobacco comment: ''Lots of 2nd hand smoke''   Vaping Use    Vaping Use: Some days   Substance and Sexual Activity    Alcohol use: No    Drug use: No    Sexual activity: Yes     Partners: Male   Other Topics Concern    Not on file   Social History Narrative    Not on file     Social Determinants of Health     Financial Resource Strain:     Difficulty of Paying Living Expenses: Not on file   Food Insecurity:     Worried About Running Out of Food in the Last Year: Not on file    Adam of Food in the Last Year: Not on file   Transportation Needs:     Lack of Transportation (Medical): Not on file    Lack of Transportation (Non-Medical):  Not on file   Physical Activity:     Days of Exercise per Week: Not on file    Minutes of Exercise per Session: Not on file   Stress:     Feeling of Stress : Not on file   Social Connections:     Frequency of Communication with Friends and Family: Not on file    Frequency of Social Gatherings with Friends and Family: Not on file    Attends Druze Services: Not on file    Active Member of Clubs or Organizations: Not on file    Attends Club or Organization Meetings: Not on file    Marital Status: Not on file   Intimate Partner Violence:     Fear of Current or Ex-Partner: Not on file    Emotionally Abused: Not on file    Physically Abused: Not on file    Sexually Abused: Not on file   Housing Stability:     Unable to Pay for Housing in the Last Year: Not on file    Number of Kristine in the Last Year: Not on file    Unstable Housing in the Last Year: Not on file       Family History   Problem Relation Age of Onset    Diabetes Other        Allergies:    Bee venom and Percocet [oxycodone-acetaminophen]    Home Medications:  Prior to Admission medications    Medication Sig Start Date End Date Taking?  Authorizing Provider   ondansetron (ZOFRAN ODT) 4 MG disintegrating tablet Take 1 tablet by mouth every 8 hours as needed for Nausea or Vomiting 1/3/22 1/10/22  Yuko Denise MD   guaiFENesin-dextromethorphan (ROBITUSSIN DM) 100-10 MG/5ML syrup Take 5 mLs by mouth 3 times daily as needed for Cough 1/2/22 1/12/22  Jonah Toledo,    benzonatate (TESSALON) 100 MG capsule Take 1 capsule by mouth every 8 hours as needed for Cough 12/31/21 1/7/22  Learta Corporal, DO   pantoprazole (PROTONIX) 20 MG tablet Take 2 tablets by mouth daily 1/2/21   Laurita Flores,    ibuprofen (ADVIL;MOTRIN) 800 MG tablet Take 1 tablet by mouth every 8 hours as needed for Pain  Patient not taking: Reported on 9/30/2019 8/8/19   Alexandra Barba MD   cephALEXin (KEFLEX) 500 MG capsule Take 500 mg by mouth 3 times daily 3/31/19   Historical Provider, MD   sulfamethoxazole-trimethoprim (BACTRIM DS;SEPTRA DS) 800-160 MG per tablet Take 1 tablet by mouth 2 times daily 3/31/19   Historical Provider, MD   Multiple Vitamins-Minerals (THERAPEUTIC MULTIVITAMIN-MINERALS) tablet Take 1 tablet by mouth daily    Historical Provider, MD   ibuprofen (ADVIL;MOTRIN) 800 MG tablet Take 1 tablet by mouth every 8 hours as needed for Pain or Fever 3/7/19 3/17/19  Pauline Aponte MD   levothyroxine (SYNTHROID) 200 MCG tablet Take 0.5 tablets by mouth Daily. Patient taking differently:   Take 200 mcg by mouth Daily  7/26/14   See-Yin So, DO       REVIEW OF SYSTEMS    (2-9 systems for level 4, 10 or more for level 5)      Review of Systems   Constitutional: Negative for chills and fever. HENT: Negative for congestion. Eyes: Positive for visual disturbance. Respiratory: Negative for cough and shortness of breath. Cardiovascular: Negative for chest pain. Gastrointestinal: Negative for abdominal pain, constipation, diarrhea, nausea and vomiting. Genitourinary: Negative for difficulty urinating, dysuria, vaginal bleeding and vaginal discharge. Musculoskeletal: Negative for back pain. Skin: Negative for wound. Neurological: Positive for headaches. Negative for weakness and numbness. PHYSICAL EXAM   (up to 7 for level 4, 8 or more for level 5)    INITIAL VITALS:   /81   Pulse 67   Temp 98.4 °F (36.9 °C) (Oral)   Resp 20   SpO2 94%   I have reviewed the triage vital signs. Const: Well nourished, well developed, appears stated age  Eyes: PERRL, no conjunctival injection  HENT: NCAT, Neck supple without meningismus   CV: RRR, Warm, well-perfused extremities  RESP: CTAB, Unlabored respiratory effort  GI: soft, non-tender, non-distended, no masses  MSK: No gross deformities appreciated  Skin: Warm, dry. No rashes  Neuro: Alert, CNs II-XII grossly intact. Sensation and motor function of extremities grossly intact. Psych: Appropriate mood and affect. DIFFERENTIAL  DIAGNOSIS   DDX:  Migraine, subarachnoid hemorrhage, cerebral aneurysm, intracranial bleed    Initial MDM:  HPI: 51-year-old female with history of cerebral aneurysm presents to the emergency department for worsening headache. Vitals: Within normal limits  PE: See above  Plan: CBC, BMP. Consult to endovascular neurosurgery. CT head without contrast, CTA head neck with contrast. Migraine cocktail.     PLAN (LABS / IMAGING / EKG):  Orders Placed This Encounter Procedures    CT HEAD WO CONTRAST    CTA HEAD NECK W CONTRAST    CBC WITH AUTO DIFFERENTIAL    BASIC METABOLIC PANEL    Inpatient Consult to Endovascular Neurosurgery       MEDICATIONS ORDERED:  Orders Placed This Encounter   Medications    0.9 % sodium chloride bolus    prochlorperazine (COMPAZINE) injection 10 mg         DIAGNOSTIC RESULTS / EMERGENCY DEPARTMENT COURSE / MDM   LAB RESULTS:  No results found for this visit on 01/07/22. RADIOLOGY:  Awaiting MRIs. CONSULTS:  IP CONSULT TO ENDOVASCULAR NEUROSURGERY      MDM/EMERGENCY DEPARTMENT COURSE:  37-year-old female with history of cerebral aneurysm presents to the emergency department for worsening headache. Endovascular neurosurgery evaluated patient and would like to conduct MRI brain, MRA head. Patient currently on MRI. Transfer patient care to Dr. Giovanna Lopez. CRITICAL CARE:  Please see Attending Note    FINAL IMPRESSION      1. Acute nonintractable headache, unspecified headache type    2. Cerebral aneurysm          DISPOSITION / PLAN     DISPOSITION      PATIENT REFERRED TO:  No follow-up provider specified.     DISCHARGE MEDICATIONS:  New Prescriptions    No medications on file       Rory Boyd DO  Emergency Medicine Resident    (Please note that portions of this note were completed with a voice recognition program.  Efforts were made to edit the dictations but occasionally words are mis-transcribed.)        Rory Boyd DO  Resident  01/08/22 5319

## 2022-01-07 NOTE — ED PROVIDER NOTES
[facility]  Emergency Department  Faculty Attestation     I performed a history and physical examination of the patient and discussed management with the resident. I reviewed the residents note and agree with the documented findings and plan of care. Any areas of disagreement are noted on the chart. I was personally present for the key portions of any procedures. I have documented in the chart those procedures where I was not present during the key portions. I have reviewed the emergency nurses triage note. I agree with the chief complaint, past medical history, past surgical history, allergies, medications, social and family history as documented unless otherwise noted below. For Physician Assistant/ Nurse Practitioner cases/documentation I have personally evaluated this patient and have completed at least one if not all key elements of the E/M (history, physical exam, and MDM). Additional findings are as noted.       Primary Care Physician:  Genesis Flores MD    Screenings:  [unfilled]    CHIEF COMPLAINT       Chief Complaint   Patient presents with    Headache     Hx of brain aneurysm       RECENT VITALS:   Temp: 98.4 °F (36.9 °C),  Pulse: 67, Resp: 20, BP: 118/77    LABS:  Labs Reviewed   CBC WITH AUTO DIFFERENTIAL - Abnormal; Notable for the following components:       Result Value    RBC 3.79 (*)     Hemoglobin 10.0 (*)     Hematocrit 31.0 (*)     MCV 81.8 (*)     RDW 15.3 (*)     Platelets 772 (*)     Immature Granulocytes 1 (*)     All other components within normal limits   BASIC METABOLIC PANEL - Abnormal; Notable for the following components:    Calcium 8.5 (*)     Potassium 3.5 (*)     All other components within normal limits   HCG, SERUM, QUALITATIVE       Radiology  MRI BRAIN WO CONTRAST    (Results Pending)   MRA HEAD W WO CONTRAST    (Results Pending)         Attending Physician Additional  Notes    She was seen recently for new onset migraine type headache with photophobia and nausea, primarily on the left side. She is diagnosed as having a 2 mm aneurysm versus infundibulum. She was seen by neurology and recommended outpatient treatment with DSA. She has had persistence of her headaches and in fact mild worsening. No new strokelike symptoms. No stiff neck or AP  Muscle movement. On exam GCS is 15, vital signs are normal.  She appears nontoxic. Normal speech and mentation. She moves all extremities. She being seen by neurology. Impression is persistent migraine, consider subarachnoid hemorrhage versus vasospasm. Plan is migraine cocktail, MR I/MRA, consider lumbar puncture for excluding subarachnoid hemorrhage. Arch Staff.  Henrry Ang MD, [facility]  Attending Emergency  Physician               Paris Jack MD  01/07/22 4707

## 2022-01-07 NOTE — ED NOTES
Dr. Charanjit Chan spoke with pt, she agrees to stay for one night; angiogram to be completed tmrw      Diaz Constantino RN  01/07/22 4922

## 2022-01-07 NOTE — CONSULTS
Endovascular Neurosurgery Consult      Reason for evaluation: L acom aneurysm    SUBJECTIVE:   History of Chief Complaint:    39yo female with pmh of hypothyroid, migraine, COVID19+ 12/24/2021. 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 Pt has a hx of migraines, frontal to apex pressure/throbbing pain. For the last 2-3mo symptoms have worsened and are now daily. Pt presented to er 1/3/2022 (4d ago) with new L eye negative scotoma in a J shape. At the time ct/cta were completed which showed no bleed but presence of a 2mm acom aneurysm. Pt left ama. Pt returned again today after her headache changed slightly, now with pain in her cranial apex. Pain is not severe, vision deficit was persistent. No associated n/v ams, thunderclap headache nuchal rigidity. Allergies  is allergic to bee venom and percocet [oxycodone-acetaminophen]. Medications  Prior to Admission medications    Medication Sig Start Date End Date Taking?  Authorizing Provider   ondansetron (ZOFRAN ODT) 4 MG disintegrating tablet Take 1 tablet by mouth every 8 hours as needed for Nausea or Vomiting 1/3/22 1/10/22  Mare Lemon MD   guaiFENesin-dextromethorphan (ROBITUSSIN DM) 100-10 MG/5ML syrup Take 5 mLs by mouth 3 times daily as needed for Cough 1/2/22 1/12/22  Loren Kimble,    benzonatate (TESSALON) 100 MG capsule Take 1 capsule by mouth every 8 hours as needed for Cough 12/31/21 1/7/22  Rakesh Wallis DO   pantoprazole (PROTONIX) 20 MG tablet Take 2 tablets by mouth daily 1/2/21   Gentry Stubbs DO   ibuprofen (ADVIL;MOTRIN) 800 MG tablet Take 1 tablet by mouth every 8 hours as needed for Pain  Patient not taking: Reported on 9/30/2019 8/8/19   Geraldo Rivero MD   cephALEXin (KEFLEX) 500 MG capsule Take 500 mg by mouth 3 times daily 3/31/19   Historical Provider, MD   sulfamethoxazole-trimethoprim (BACTRIM DS;SEPTRA DS) 800-160 MG per tablet Take 1 tablet by mouth 2 times daily 3/31/19   Historical Provider, MD   Multiple Vitamins-Minerals (THERAPEUTIC MULTIVITAMIN-MINERALS) tablet Take 1 tablet by mouth daily    Historical Provider, MD   ibuprofen (ADVIL;MOTRIN) 800 MG tablet Take 1 tablet by mouth every 8 hours as needed for Pain or Fever 3/7/19 3/17/19  Chantel Labor, MD   levothyroxine (SYNTHROID) 200 MCG tablet Take 0.5 tablets by mouth Daily. Patient taking differently:   Take 200 mcg by mouth Daily  14   See-Yin So, DO    Scheduled Meds:  Continuous Infusions:  PRN Meds:.diphenhydrAMINE  Past Medical History   has a past medical history of Heart palpitations and Hypothyroidism. Past Surgical History   has a past surgical history that includes  section; Dilation and curettage of uterus; Breast enhancement surgery; Hand surgery (Right); and Umbilical hernia repair (N/A, 3/7/2019). Social History   reports that she has quit smoking. Her smoking use included cigarettes. She has never used smokeless tobacco.   reports no history of alcohol use. reports no history of drug use. Family History  family history includes Diabetes in an other family member. Review of Systems:  CONSTITUTIONAL:  negative for fevers, chills, fatigue and malaise    EYES:  negative for double vision, blurred vision and photophobia     HEENT:  negative for tinnitus, epistaxis and sore throat    RESPIRATORY:  negative for cough, shortness of breath, wheezing    CARDIOVASCULAR:  negative for chest pain, palpitations, syncope, edema    GASTROINTESTINAL:  negative for nausea, vomiting    GENITOURINARY:  negative for incontinence    MUSCULOSKELETAL:  negative for neck or back pain    NEUROLOGICAL:  Negative for weakness and tingling  negative for headaches and dizziness    PSYCHIATRIC:  negative for anxiety      Review of systems otherwise negative.       OBJECTIVE:     Vitals:    22 1517   BP: 118/77   Pulse:    Resp:    Temp:    SpO2: 96% 2 mm aneurysm at the junction of the left anterior cerebral and anterior   communicating arteries. 3. No evidence of large vessel occlusion or hemodynamically significant   stenosis involving the head and neck arteries. 4. Scattered areas of patchy and ground-glass opacities are noted at the   bilateral pulmonary apices. These are nonspecific but could indicate an   atypical/viral pneumonia, inclusive of COVID-19. ASSESSMENT:   41yo female with pmh of hypothyroid, migraine, COVID19+ 12/24/2021. 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 reasonable to check dx dsa given persistent symptoms. Risks and benefits discussed, risks include but are not limited to bruising, stroke, subarachnoid hemorrhage, death, retroperitoneal hematoma, pseudoaneurysm, lower extremity/renal/peripheral vascular compromise, informed consent obtained form pt. PLAN:   --check mri brain wo con and mra head w and wo con now, please complete prior to dx dsa.  --dx dsa tomorrow ~1pm. Npo midnight. Pt is consented. --sbp < 140  --admit to floor.  --f/u dr May Matthews 2w after discharge, dr Ld Apodaca 3 mo after discharge. Case discussed with Dr. Lo Hughes attending.     Chasity Jenkins MD, PhD  Stroke, [facility]  [facility]  Electronically signed 1/7/2022 at 5:32 PM

## 2022-01-07 NOTE — LETTER
[facility]  [id number] [place] [address]  Phone: [phone]    No name on file. January 12, 2022     Patient: Terrell Cespedes   YOB: 1975   Date of Visit: 1/7/2022       To Whom It May Concern: It is my medical opinion that Luis Armando Donnelly may return to work under light duty only with no heavy lifting or bending for the next four weeks until she follows up with the endovascular office. If you have any questions or concerns, please don't hesitate to call.     Sincerely,        MARQUES Hawk-[facility]  [phone]

## 2022-01-08 LAB
MAGNESIUM: 2.1 MG/DL (ref 1.6–2.6)
SARS-COV-2, RAPID: NOT DETECTED
SPECIMEN DESCRIPTION: NORMAL

## 2022-01-08 PROCEDURE — 99233 SBSQ HOSP IP/OBS HIGH 50: CPT | Performed by: PSYCHIATRY & NEUROLOGY

## 2022-01-08 PROCEDURE — 2709999900 HC NON-CHARGEABLE SUPPLY

## 2022-01-08 PROCEDURE — 83735 ASSAY OF MAGNESIUM: CPT

## 2022-01-08 PROCEDURE — 6370000000 HC RX 637 (ALT 250 FOR IP): Performed by: STUDENT IN AN ORGANIZED HEALTH CARE EDUCATION/TRAINING PROGRAM

## 2022-01-08 PROCEDURE — 2060000000 HC ICU INTERMEDIATE R&B

## 2022-01-08 PROCEDURE — C1769 GUIDE WIRE: HCPCS

## 2022-01-08 PROCEDURE — 36415 COLL VENOUS BLD VENIPUNCTURE: CPT

## 2022-01-08 PROCEDURE — 2580000003 HC RX 258: Performed by: STUDENT IN AN ORGANIZED HEALTH CARE EDUCATION/TRAINING PROGRAM

## 2022-01-08 PROCEDURE — C1887 CATHETER, GUIDING: HCPCS

## 2022-01-08 PROCEDURE — 99254 IP/OBS CNSLTJ NEW/EST MOD 60: CPT | Performed by: PSYCHIATRY & NEUROLOGY

## 2022-01-08 PROCEDURE — C1894 INTRO/SHEATH, NON-LASER: HCPCS

## 2022-01-08 PROCEDURE — 87635 SARS-COV-2 COVID-19 AMP PRB: CPT

## 2022-01-08 RX ORDER — TOPIRAMATE 25 MG/1
25 TABLET ORAL NIGHTLY
Status: DISCONTINUED | OUTPATIENT
Start: 2022-01-08 | End: 2022-01-11

## 2022-01-08 RX ADMIN — TOPIRAMATE 25 MG: 25 TABLET, FILM COATED ORAL at 21:03

## 2022-01-08 RX ADMIN — PANTOPRAZOLE SODIUM 40 MG: 40 TABLET, DELAYED RELEASE ORAL at 09:42

## 2022-01-08 RX ADMIN — LEVOTHYROXINE SODIUM 200 MCG: 100 TABLET ORAL at 09:42

## 2022-01-08 RX ADMIN — SODIUM CHLORIDE, PRESERVATIVE FREE 10 ML: 5 INJECTION INTRAVENOUS at 21:03

## 2022-01-08 ASSESSMENT — PAIN DESCRIPTION - PROGRESSION

## 2022-01-08 ASSESSMENT — ENCOUNTER SYMPTOMS
WHEEZING: 0
ABDOMINAL PAIN: 0
DIARRHEA: 0
VOMITING: 0
SHORTNESS OF BREATH: 0
NAUSEA: 0
ABDOMINAL DISTENTION: 0
COUGH: 0
CHEST TIGHTNESS: 0

## 2022-01-08 ASSESSMENT — PAIN SCALES - GENERAL: PAINLEVEL_OUTOF10: 0

## 2022-01-08 NOTE — PROGRESS NOTES
Endovascular Neurosurgery Progress Note    SUBJECTIVE:     Patient seen and examined at bedside. No acute events overnight. Pt tells me she has an itchy sensation that kept her up on her scalp. Patient has been NPO since midnight. Review of Systems:  CONSTITUTIONAL:  negative for fevers, chills, fatigue and malaise    EYES:  negative for double vision, blurred vision and photophobia     HEENT:  negative for tinnitus, epistaxis and sore throat    RESPIRATORY:  negative for cough, shortness of breath, wheezing    CARDIOVASCULAR:  negative for chest pain, palpitations, syncope, edema    GASTROINTESTINAL:  negative for nausea, vomiting    GENITOURINARY:  negative for incontinence    MUSCULOSKELETAL:  negative for neck or back pain    NEUROLOGICAL:  Negative for weakness and tingling  negative for headaches and dizziness    PSYCHIATRIC:  negative for anxiety      Review of systems otherwise negative. OBJECTIVE:     Vitals:    01/08/22 0400   BP: 116/75   Pulse: 66   Resp: 22   Temp: 98.4 °F (36.9 °C)   SpO2: 94%        General:  Gen: normal habitus, NAD  HEENT: NCAT, mucosa moist  Cvs: RRR, S1 S2 normal  Resp: symmetric unlabored breathing  Abd: s/nd/nt  Ext: no edema  Skin: no lesions seen, warm and dry     Neuro:  Gen: awake and alert, oriented x3. Lang/speech: no aphasia or dysarthria. Follows commands. CN: PERRL, EOMI, VFF to finger count. L eye central J shaped grey scotoma. V1-3 intact, face symmetric, hearing intact, shoulder shrug symmetric, tongue midline  Motor: grossly 5/5 UE and LE b/l  Sense: LT intact in all 4 ext. Coord: FTN and HTS intact b/l  DTR: deferred  Gait: deferred    NIH Stroke Scale:   1a  Level of consciousness: 0 - alert; keenly responsive   1b. LOC questions:  0 - answers both questions correctly   1c. LOC commands: 0 - performs both tasks correctly   2. Best Gaze: 0 - normal   3. Visual: 1 - partial hemianopia   4. Facial Palsy: 0 - normal symmetric movement   5a.  Motor left arm: 0 - no drift, limb holds 90 (or 45) degrees for full 10 seconds   5b. Motor right arm: 0 - no drift, limb holds 90 (or 45) degrees for full 10 seconds   6a. Motor left le - no drift; leg holds 30 degree position for full 5 seconds   6b  Motor right le - no drift; leg holds 30 degree position for full 5 seconds   7. Limb Ataxia: 0 - absent   8. Sensory: 0 - normal; no sensory loss   9. Best Language:  0 - no aphasia, normal   10. Dysarthria: 0 - normal   11. Extinction and Inattention: 0 - no abnormality         Total:   1     MRS: 1      LABS:   Reviewed. Lab Results   Component Value Date    HGB 10.0 (L) 2022    WBC 5.6 2022     (H) 2022     2022    BUN 6 2022    CREATININE 0.61 2022    AST 26 2022    ALT 13 2022    MG 2.0 2017      Lab Results   Component Value Date    COVID19 Not Detected 2022    COVID19 Not Detected 11/10/2020       RADIOLOGY:   Images were personally reviewed including:    MRA Head w/o contrast 2022  Unchanged 2 mm superior projecting aneurysm at the junction of left  DIPTI A1 segment and anterior communicating artery. No hemodynamically significant stenosis. MRI brain w/o contrast 2022  Unremarkable brain MRI. Satira Cones is no acute infarction, intracranial  hemorrhage, or intracranial mass lesion. CT/A head and neck w con 1/3/2022  1. No acute intracranial abnormality. 2. 2 mm aneurysm at the junction of the left anterior cerebral and anterior  communicating arteries. 3. No evidence of large vessel occlusion or hemodynamically significant  stenosis involving the head and neck arteries. 4. Scattered areas of patchy and ground-glass opacities are noted at the  bilateral pulmonary apices. These are nonspecific but could indicate an  atypical/viral pneumonia, inclusive of COVID-19. ASSESSMENT:     41yo female with pmh of hypothyroid, migraine, COVID19+ 2021.   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 reasonable to check dx dsa given persistent symptoms. MRI/MRA completed showing 2 mm aneurysm location at junction of left DIPTI and ACOMM. Recommendations:     --dx dsa today~1pm. Npo since midnight. Pt is consented. --sbp < 140  --f/u dr Leonela Jackson 2w after discharge, dr Jamie Lezama 3 mo after discharge. Federica Bautista, MS4  Stroke, [facility]  Electronically signed 1/8/2022 at 8:15 AM    Addendum:  Agree with above. recs as above.     Rafael Garcia fellow

## 2022-01-08 NOTE — ED NOTES
PT given turkey sandwich box. Informed she will be NPO at midnight for IR tomorrow. Given warm blanket and repositioned for comfort. VSS.       Ema Painter RN  01/07/22 2034

## 2022-01-08 NOTE — PLAN OF CARE
Problem: Pain:  Goal: Pain level will decrease  Description: Pain level will decrease  1/8/2022 0511 by Woody Keene RN  Outcome: Ongoing  1/8/2022 0511 by Woody Keene RN  Outcome: Ongoing  Goal: Control of acute pain  Description: Control of acute pain  Outcome: Ongoing

## 2022-01-08 NOTE — ED PROVIDER NOTES
[facility] ED  Emergency Department  Emergency Medicine Resident Sign-out     Care of Mirza Sandy was assumed from Dr. Sonu Pike  and is being seen for Headache (Hx of brain aneurysm)  . The patient's initial evaluation and plan have been discussed with the prior provider who initially evaluated the patient. EMERGENCY DEPARTMENT COURSE / MEDICAL DECISION MAKING:       MEDICATIONS GIVEN:  Orders Placed This Encounter   Medications    0.9 % sodium chloride bolus    prochlorperazine (COMPAZINE) injection 10 mg    diphenhydrAMINE (BENADRYL) injection 25 mg       LABS / RADIOLOGY:     Labs Reviewed   CBC WITH AUTO DIFFERENTIAL - Abnormal; Notable for the following components:       Result Value    RBC 3.79 (*)     Hemoglobin 10.0 (*)     Hematocrit 31.0 (*)     MCV 81.8 (*)     RDW 15.3 (*)     Platelets 554 (*)     Immature Granulocytes 1 (*)     All other components within normal limits   BASIC METABOLIC PANEL - Abnormal; Notable for the following components:    Calcium 8.5 (*)     Potassium 3.5 (*)     All other components within normal limits   HCG, SERUM, QUALITATIVE       CT Head WO Contrast    Result Date: 1/3/2022  EXAMINATION: CTA OF THE HEAD AND NECK WITH CONTRAST; CT OF THE HEAD WITHOUT CONTRAST 1/3/2022 9:00 pm: TECHNIQUE: CTA of the head and neck was performed with the administration of intravenous contrast. Multiplanar reformatted images are provided for review. MIP images are provided for review. Stenosis of the internal carotid arteries measured using NASCET criteria.  Dose modulation, iterative reconstruction, and/or weight based adjustment of the mA/kV was utilized to reduce the radiation dose to as low as reasonably achievable.; CT of the head was performed without the administration of intravenous contrast. Dose modulation, iterative reconstruction, and/or weight based adjustment of the mA/kV was utilized to reduce the radiation dose to as low as reasonably achievable. Noncontrast CT of the head with reconstructed 2-D images are also provided for review. COMPARISON: None. HISTORY: ORDERING SYSTEM PROVIDED HISTORY: dizziness TECHNOLOGIST PROVIDED HISTORY: dizziness Decision Support Exception - unselect if not a suspected or confirmed emergency medical condition->Emergency Medical Condition (MA); ORDERING SYSTEM PROVIDED HISTORY: dizziness TECHNOLOGIST PROVIDED HISTORY: dizziness Decision Support Exception - unselect if not a suspected or confirmed emergency medical condition->Emergency Medical Condition (MA) Is the patient pregnant?->No FINDINGS: CT HEAD: BRAIN/VENTRICLES:  No acute intracranial hemorrhage or extraaxial fluid collection. Grey-white differentiation is maintained. No evidence of mass, mass effect or midline shift. No evidence of hydrocephalus. ORBITS: The visualized portion of the orbits demonstrate no acute abnormality. SINUSES:  The visualized paranasal sinuses and mastoid air cells demonstrate no acute abnormality. SOFT TISSUES/SKULL: No acute abnormality of the visualized skull or soft tissues. CTA NECK: AORTIC ARCH/ARCH VESSELS: No dissection or arterial injury. No significant stenosis of the brachiocephalic or subclavian arteries. CAROTID ARTERIES: No dissection, arterial injury, or hemodynamically significant stenosis by NASCET criteria. VERTEBRAL ARTERIES: No dissection, arterial injury, or significant stenosis. SOFT TISSUES: Scattered areas of patchy and ground-glass opacities are noted at the bilateral pulmonary apices. These are nonspecific but could indicate an atypical/viral pneumonia, inclusive of COVID-19. No cervical or superior mediastinal lymphadenopathy. The larynx and pharynx are unremarkable. No acute abnormality of the salivary and thyroid glands. BONES: No acute osseous abnormality. 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   No significant stenosis of the intracranial internal carotid, anterior cerebral, or middle cerebral arteries. No aneurysm. POSTERIOR CIRCULATION: No significant stenosis of the vertebral, basilar, or posterior cerebral arteries. No aneurysm. OTHER: No dural venous sinus thrombosis on this non-dedicated study. 1. No acute intracranial abnormality. 2. 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 3. No evidence of large vessel occlusion or hemodynamically significant stenosis involving the head and neck arteries. 4. Scattered areas of patchy and ground-glass opacities are noted at the bilateral pulmonary apices. These are nonspecific but could indicate an atypical/viral pneumonia, inclusive of COVID-19. MRA HEAD WO CONTRAST    Result Date: 1/7/2022  EXAMINATION: MRA OF THE HEAD WITHOUT CONTRAST; MRI OF THE BRAIN WITHOUT CONTRAST 1/7/2022 5:42 pm TECHNIQUE: MRA of the head was performed utilizing time-of-flight imaging with MIP images. No intravenous contrast was administered.; Multiplanar multisequence MRI of the brain was performed without the administration of intravenous contrast. COMPARISON: None HISTORY: ORDERING SYSTEM PROVIDED HISTORY: worsening headaches, hx of cerebral aneurysm TECHNOLOGIST PROVIDED HISTORY: worsening headaches, hx of cerebral aneurysm Is the patient pregnant?->No Reason for Exam: worsening headaches, history of cerebral aneurysm FINDINGS: MRA head: ANTERIOR CIRCULATION: Unchanged 2 mm superiorly projecting outpouching at the junction of left DIPTI A1 segment and anterior communicating artery likely representing a small aneurysm. No significant stenosis of the intracranial internal carotid, anterior cerebral, or middle cerebral arteries. POSTERIOR CIRCULATION: No significant stenosis of the vertebral, basilar, or posterior cerebral arteries. MRI brain: There is no acute infarction, intracranial hemorrhage, or intracranial mass lesion.  No mass effect, midline shift, or extra-axial collection is noted. The brain parenchyma is  normal. The pituitary gland is normal in appearance. The cerebellar tonsils are in normal position. The ventricles, sulci, and cisterns are within normal size and range. No significant volume loss. .  The intracranial flow voids are preserved. The globes and orbits are within normal limits. The visualized extracranial structures including paranasal sinuses and mastoid air cells are unremarkable. MRA head: Unchanged 2 mm superior projecting aneurysm at the junction of left DIPTI A1 segment and anterior communicating artery. No hemodynamically significant stenosis. Brain MRI: Unremarkable brain MRI. There is no acute infarction, intracranial hemorrhage, or intracranial mass lesion. RECOMMENDATIONS: Unavailable     XR CHEST PORTABLE    Result Date: 1/2/2022  EXAMINATION: ONE XRAY VIEW OF THE CHEST 1/2/2022 1:12 am COMPARISON: 01/02/2021. HISTORY: ORDERING SYSTEM PROVIDED HISTORY: sob cough TECHNOLOGIST PROVIDED HISTORY: sob cough Reason for Exam: Upright port, Cough, SOB FINDINGS: Cardiomediastinal silhouette appears within normal limits. Scattered bilateral patchy airspace opacities, most notable in the left mid and right lower lung zones. Costophrenic angles are sharp. No evidence of pneumothorax. No acute osseous abnormalities. Scattered bilateral patchy airspace opacities, most notable in the left mid and right lower lung zones. Finding concerning for multifocal pneumonia, possibly COVID-19 pneumonia in the appropriate clinical setting. CTA HEAD NECK W CONTRAST    Result Date: 1/3/2022  EXAMINATION: CTA OF THE HEAD AND NECK WITH CONTRAST; CT OF THE HEAD WITHOUT CONTRAST 1/3/2022 9:00 pm: TECHNIQUE: CTA of the head and neck was performed with the administration of intravenous contrast. Multiplanar reformatted images are provided for review. MIP images are provided for review.  Stenosis of the internal carotid arteries measured using NASCET criteria. Dose modulation, iterative reconstruction, and/or weight based adjustment of the mA/kV was utilized to reduce the radiation dose to as low as reasonably achievable.; CT of the head was performed without the administration of intravenous contrast. Dose modulation, iterative reconstruction, and/or weight based adjustment of the mA/kV was utilized to reduce the radiation dose to as low as reasonably achievable. Noncontrast CT of the head with reconstructed 2-D images are also provided for review. COMPARISON: None. HISTORY: ORDERING SYSTEM PROVIDED HISTORY: dizziness TECHNOLOGIST PROVIDED HISTORY: dizziness Decision Support Exception - unselect if not a suspected or confirmed emergency medical condition->Emergency Medical Condition (MA); ORDERING SYSTEM PROVIDED HISTORY: dizziness TECHNOLOGIST PROVIDED HISTORY: dizziness Decision Support Exception - unselect if not a suspected or confirmed emergency medical condition->Emergency Medical Condition (MA) Is the patient pregnant?->No FINDINGS: CT HEAD: BRAIN/VENTRICLES:  No acute intracranial hemorrhage or extraaxial fluid collection. Grey-white differentiation is maintained. No evidence of mass, mass effect or midline shift. No evidence of hydrocephalus. ORBITS: The visualized portion of the orbits demonstrate no acute abnormality. SINUSES:  The visualized paranasal sinuses and mastoid air cells demonstrate no acute abnormality. SOFT TISSUES/SKULL: No acute abnormality of the visualized skull or soft tissues. CTA NECK: AORTIC ARCH/ARCH VESSELS: No dissection or arterial injury. No significant stenosis of the brachiocephalic or subclavian arteries. CAROTID ARTERIES: No dissection, arterial injury, or hemodynamically significant stenosis by NASCET criteria. VERTEBRAL ARTERIES: No dissection, arterial injury, or significant stenosis.  SOFT TISSUES: Scattered areas of patchy and ground-glass opacities are noted at the bilateral pulmonary at the junction of left DIPTI A1 segment and anterior communicating artery likely representing a small aneurysm. No significant stenosis of the intracranial internal carotid, anterior cerebral, or middle cerebral arteries. POSTERIOR CIRCULATION: No significant stenosis of the vertebral, basilar, or posterior cerebral arteries. MRI brain: There is no acute infarction, intracranial hemorrhage, or intracranial mass lesion. No mass effect, midline shift, or extra-axial collection is noted. The brain parenchyma is  normal. The pituitary gland is normal in appearance. The cerebellar tonsils are in normal position. The ventricles, sulci, and cisterns are within normal size and range. No significant volume loss. .  The intracranial flow voids are preserved. The globes and orbits are within normal limits. The visualized extracranial structures including paranasal sinuses and mastoid air cells are unremarkable. MRA head: Unchanged 2 mm superior projecting aneurysm at the junction of left DIPTI A1 segment and anterior communicating artery. No hemodynamically significant stenosis. Brain MRI: Unremarkable brain MRI. There is no acute infarction, intracranial hemorrhage, or intracranial mass lesion. RECOMMENDATIONS: Unavailable       RECENT VITALS:     Temp: 98.4 °F (36.9 °C),  Pulse: 67, Resp: 20, BP: 118/77, SpO2: 96 %    This patient is a 42 y.o. Female with history of aneurysm diagnosed 1/32 mm. Has previously worsening headache, left eye visual disturbance. Neurology and neuro endovascular consulted patient to go for MRI, IR angio. Patient admitted to neurology. OUTSTANDING TASKS / RECOMMENDATIONS:    1. Admitted to neurology bed pending     FINAL IMPRESSION:     1. Acute nonintractable headache, unspecified headache type    2.  Cerebral aneurysm        DISPOSITION:         DISPOSITION:  []  Discharge   []  Transfer -    [x]  Admission -  Neurology   []  Against Medical Advice   []  Eloped   FOLLOW-UP: No follow-up provider specified.    DISCHARGE MEDICATIONS: New Prescriptions    No medications on file           Jacob Fernández MD  Emergency Medicine Resident  [facility]       Jacob Fernández MD  Resident  01/07/22 0605

## 2022-01-08 NOTE — PLAN OF CARE
Problem: Pain:  Goal: Pain level will decrease  Description: Pain level will decrease  1/8/2022 1836 by Loy Luciano RN  Outcome: Ongoing  1/8/2022 0511 by Mar Ivy RN  Outcome: Ongoing  1/8/2022 0511 by Mar Ivy RN  Outcome: Ongoing  Goal: Control of acute pain  Description: Control of acute pain  1/8/2022 1836 by Loy Luciano RN  Outcome: Ongoing  1/8/2022 0511 by Mar Ivy RN  Outcome: Ongoing  Goal: Control of chronic pain  Description: Control of chronic pain  Outcome: Ongoing

## 2022-01-08 NOTE — CARE COORDINATION
Case Management Initial Discharge Plan  Xander Vargas,             Met with:patient to discuss discharge plans. Information verified: address, contacts, phone number, , insurance Yes  Insurance Provider: [address] [place] [address]    Emergency Contact/Next of Kin name & number: Mother Gentry Ramos [phone]  Who are involved in patient's support system? family    PCP: Eduard Nolan MD  Date of last visit: cant remember      Discharge Planning    Living Arrangements:  Family Members     Home has 2 stories  few stairs to climb to get into front door, flightstairs to climb to reach second floor  Location of bedroom/bathroom in home main    Patient able to perform ADL's:Independent    Current Services (outpatient & in home) none   DME equipment: none   DME provider: n/a    Is patient receiving oral anticoagulation therapy? No    If indicated:   Physician managing anticoagulation treatment:   Where does patient obtain lab work for ATC treatment? Potential Assistance Needed:  N/A    Patient agreeable to home care: No  [place] of choice provided:  n/a    Prior SNF/Rehab Placement and Facility:   Agreeable to SNF/Rehab: No  [place] of choice provided: n/a     Evaluation: no    Expected Discharge date:  01/10/22    Patient expects to be discharged to: If home: is the family and/or caregiver wiling & able to provide support at home? yes  Who will be providing this support? family*    Follow Up Appointment: Best Day/ Time:      Transportation provider: self/family  Transportation arrangements needed for discharge: Yes    Readmission Risk              Risk of Unplanned Readmission:  13             Does patient have a readmission risk score greater than 14?: No  If yes, follow-up appointment must be made within 7 days of discharge.      Goals of Care: pain control and safety      Educated patient  on transitional options, provided freedom of choice and are agreeable with plan      Discharge Plan: home independently.            Electronically signed by Rosario RN on 1/8/22 at 9:13 AM EST

## 2022-01-08 NOTE — H&P
[facility]   [address] [place] [address]    HISTORY AND PHYSICAL EXAMINATION            Date:   2022  Patient name:  Yulisa Villela  Date of admission:  2022  2:43 PM  MRN:   [id number]  Account:  [de-identified]  YOB: 1975  PCP:    Ellyn Car MD  Room:   [address] [place] [address]  Code Status:    Full Code    Chief Complaint:     Chief Complaint   Patient presents with    Headache     Hx of brain aneurysm     History Obtained From:     patient    History of Present Illness: The patient is a 42 y.o.  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 Patient has a past medical history of migraines, hypothyroidism, umbilical hernia, [id number] diagnosed 2 weeks ago. Patient's migraines described as frontal, throbbing, daily. 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 She was treated with a migraine cocktail and wished to go home. Patient presents to the emergency room again on  for change in headache with pain more focused around cranial apex.     MRI brain: Unremarkable  MRA head shows unchanged 2 mm superior projecting aneurysm at the junction of left DIPTI A1 segment and anterior communicating artery      Past Medical History:     Past Medical History:   Diagnosis Date    Heart palpitations     Hypothyroidism     s/p radioiodine ablation         Past Surgical History:     Past Surgical History:   Procedure Laterality Date    BREAST ENHANCEMENT SURGERY      x3     SECTION      failure to progress    DILATION AND CURETTAGE OF UTERUS      s/p SAB x 3    HAND SURGERY Right     CRPP R fifth MC    UMBILICAL HERNIA REPAIR N/A [id number]    HERNIA UMBILICAL REPAIR W/MESH performed by Ashley Harrington MD at [address]        Medications Prior to Admission:     Prior to Admission medications    Medication Sig Start Date End Date Taking? Authorizing Provider   ondansetron (ZOFRAN ODT) 4 MG disintegrating tablet Take 1 tablet by mouth every 8 hours as needed for Nausea or Vomiting 1/3/22 1/10/22  MD Say Polk-dextromethorphan (ROBITUSSIN DM) 100-10 MG/5ML syrup Take 5 mLs by mouth 3 times daily as needed for Cough 1/2/22 1/12/22  Earma Rough, DO   Multiple Vitamins-Minerals (THERAPEUTIC MULTIVITAMIN-MINERALS) tablet Take 1 tablet by mouth daily    Historical Provider, MD   levothyroxine (SYNTHROID) 200 MCG tablet Take 0.5 tablets by mouth Daily. Patient taking differently: Take 125 mcg by mouth Daily  7/26/14   See-Yin So, DO        Allergies:     Bee venom and Percocet [oxycodone-acetaminophen]    Social History:     Tobacco:    reports that she has quit smoking. Her smoking use included cigarettes. She has never used smokeless tobacco.  Alcohol:      reports no history of alcohol use. Drug Use:  reports no history of drug use. Family History:     Family History   Problem Relation Age of Onset    Diabetes Other        Review of Systems:     Review of Systems   Constitutional: Negative for activity change, appetite change, chills and fever. HENT: Negative for congestion. Eyes: Positive for visual disturbance. Respiratory: Negative for cough, chest tightness, shortness of breath and wheezing. Cardiovascular: Negative for chest pain and leg swelling. Gastrointestinal: Negative for abdominal distention, abdominal pain, diarrhea, nausea and vomiting. Genitourinary: Negative for dysuria and flank pain. Skin: Negative for rash. Neurological: Positive for headaches. Negative for dizziness, weakness and numbness. Psychiatric/Behavioral: Negative for agitation, behavioral problems, confusion and sleep disturbance.          Physical Exam:   /75   Pulse 66   Temp 98.4 °F (36.9 °C) (Oral)   Resp 22   Ht 5' 5\" (1.651 m)   Wt 153 lb 3.5 oz (69.5 kg)   SpO2 94%   BMI 25.50 kg/m²   Temp (24hrs), Av.1 °F (36.7 °C), Min:97.6 °F (36.4 °C), Max:98.4 °F (36.9 °C)    No results for input(s): POCGLU in the last 72 hours.     Intake/Output Summary (Last 24 hours) at 2022 0617  Last data filed at 2022 1705  Gross per 24 hour   Intake 1000 ml   Output --   Net 1000 ml         Neurologic Exam    GENERAL  Appears comfortable and in no distress   HEENT  NC/ AT   HEART  S1 and S2 heard; palpation of pulses: radial pulse    NECK  Supple and no bruits heard   MENTAL STATUS:  Alert, oriented, intact memory, no confusion, normal speech, normal language, no hallucination or delusion   CRANIAL NERVES: II     -      Visual fields intact to confrontation  III,IV,VI -  PERR, EOMs full, no ptosis  V     -     Normal facial sensation   VII    -     Normal facial symmetry  VIII   -     Intact hearing   IX,X -     Symmetrical palate  XI    -     Symmetrical shoulder shrug  XII   -     Midline tongue, no atrophy    MOTOR FUNCTION: RUE: Significant for good strength of grade 5/5 in proximal and distal muscle groups   LUE: Significant for good strength of grade 5/5 in proximal and distal muscle groups   RLE: Significant for good strength of grade 5/5 in proximal and distal muscle groups   LLE: Significant for good strength of grade 5/5 in proximal and distal muscle groups      Normal bulk, normal tone and no involuntary movements, no tremor   SENSORY FUNCTION:  Normal touch, normal pinprick, normal vibration, normal proprioception   CEREBELLAR FUNCTION:  Intact fine motor control over upper limbs and lower limbs   REFLEX FUNCTION:  Symmetric in upper and lower extremities, no Babinski sign   STATION and GAIT  Normal gait and tandem station, normal tip toes and heel walking         Investigations:      Laboratory Testing:  Recent Results (from the past 24 hour(s))   CBC WITH AUTO DIFFERENTIAL    Collection Time: 22  3:15 PM   Result Value Ref Range    WBC 5.6 3.5 - 11.3 k/uL    RBC 3.79 (L) 3.95 - 5.11 m/uL    Hemoglobin 10.0 (L) 11.9 - 15.1 g/dL    Hematocrit 31.0 (L) 36.3 - 47.1 %    MCV 81.8 (L) 82.6 - 102.9 fL    MCH 26.4 25.2 - 33.5 pg    MCHC 32.3 28.4 - 34.8 g/dL    RDW 15.3 (H) 11.8 - 14.4 %    Platelets 205 (H) 068 - 453 k/uL    MPV 9.1 8.1 - 13.5 fL    NRBC Automated 0.0 0.0 per 100 WBC    Differential Type NOT REPORTED     WBC Morphology NOT REPORTED     RBC Morphology ANISOCYTOSIS PRESENT     Platelet Estimate NOT REPORTED     Seg Neutrophils 55 36 - 65 %    Lymphocytes 35 24 - 43 %    Monocytes 8 3 - 12 %    Eosinophils % 1 1 - 4 %    Basophils 0 0 - 2 %    Immature Granulocytes 1 (H) 0 %    Segs Absolute 3.05 1.50 - 8.10 k/uL    Absolute Lymph # 1.93 1.10 - 3.70 k/uL    Absolute Mono # 0.43 0.10 - 1.20 k/uL    Absolute Eos # 0.08 0.00 - 0.44 k/uL    Basophils Absolute <0.03 0.00 - 0.20 k/uL    Absolute Immature Granulocyte 0.08 0.00 - 0.30 k/uL   BASIC METABOLIC PANEL    Collection Time: 01/07/22  3:15 PM   Result Value Ref Range    Glucose 92 70 - 99 mg/dL    BUN 6 6 - 20 mg/dL    CREATININE 0.61 0.50 - 0.90 mg/dL    Bun/Cre Ratio NOT REPORTED 9 - 20    Calcium 8.5 (L) 8.6 - 10.4 mg/dL    Sodium 138 135 - 144 mmol/L    Potassium 3.5 (L) 3.7 - 5.3 mmol/L    Chloride 103 98 - 107 mmol/L    CO2 24 20 - 31 mmol/L    Anion Gap 11 9 - 17 mmol/L    GFR Non-African American >60 >60 mL/min    GFR African American >60 >60 mL/min    GFR Comment          GFR Staging NOT REPORTED    HCG Qualitative, Serum    Collection Time: 01/07/22  3:15 PM   Result Value Ref Range    hCG Qual NEGATIVE NEGATIVE   COVID-19, Rapid    Collection Time: 01/08/22 12:55 AM    Specimen: Nasopharyngeal Swab   Result Value Ref Range    Specimen Description . NASOPHARYNGEAL SWAB     SARS-CoV-2, Rapid Not Detected Not Detected         Assessment :      Primary Problem  <principal problem not specified>    Active Hospital Problems    Diagnosis Date Noted    Migraine syndrome [G43.909] 01/07/2022

## 2022-01-09 ENCOUNTER — APPOINTMENT (OUTPATIENT)
Dept: INTERVENTIONAL RADIOLOGY/VASCULAR | Age: 47
DRG: 054 | End: 2022-01-09
Payer: COMMERCIAL

## 2022-01-09 PROCEDURE — 36224 PLACE CATH CAROTD ART: CPT | Performed by: PSYCHIATRY & NEUROLOGY

## 2022-01-09 PROCEDURE — 2709999900 HC NON-CHARGEABLE SUPPLY

## 2022-01-09 PROCEDURE — 6370000000 HC RX 637 (ALT 250 FOR IP): Performed by: STUDENT IN AN ORGANIZED HEALTH CARE EDUCATION/TRAINING PROGRAM

## 2022-01-09 PROCEDURE — 76377 3D RENDER W/INTRP POSTPROCES: CPT | Performed by: PSYCHIATRY & NEUROLOGY

## 2022-01-09 PROCEDURE — B31F1ZZ FLUOROSCOPY OF LEFT VERTEBRAL ARTERY USING LOW OSMOLAR CONTRAST: ICD-10-PCS | Performed by: PSYCHIATRY & NEUROLOGY

## 2022-01-09 PROCEDURE — B31C1ZZ FLUOROSCOPY OF BILATERAL EXTERNAL CAROTID ARTERIES USING LOW OSMOLAR CONTRAST: ICD-10-PCS | Performed by: PSYCHIATRY & NEUROLOGY

## 2022-01-09 PROCEDURE — B31R1ZZ FLUOROSCOPY OF INTRACRANIAL ARTERIES USING LOW OSMOLAR CONTRAST: ICD-10-PCS | Performed by: PSYCHIATRY & NEUROLOGY

## 2022-01-09 PROCEDURE — 36226 PLACE CATH VERTEBRAL ART: CPT

## 2022-01-09 PROCEDURE — B3181ZZ FLUOROSCOPY OF BILATERAL INTERNAL CAROTID ARTERIES USING LOW OSMOLAR CONTRAST: ICD-10-PCS | Performed by: PSYCHIATRY & NEUROLOGY

## 2022-01-09 PROCEDURE — 76377 3D RENDER W/INTRP POSTPROCES: CPT

## 2022-01-09 PROCEDURE — 36224 PLACE CATH CAROTD ART: CPT

## 2022-01-09 PROCEDURE — 99233 SBSQ HOSP IP/OBS HIGH 50: CPT | Performed by: PSYCHIATRY & NEUROLOGY

## 2022-01-09 PROCEDURE — B3151ZZ FLUOROSCOPY OF BILATERAL COMMON CAROTID ARTERIES USING LOW OSMOLAR CONTRAST: ICD-10-PCS | Performed by: PSYCHIATRY & NEUROLOGY

## 2022-01-09 PROCEDURE — 99152 MOD SED SAME PHYS/QHP 5/>YRS: CPT | Performed by: PSYCHIATRY & NEUROLOGY

## 2022-01-09 PROCEDURE — 36226 PLACE CATH VERTEBRAL ART: CPT | Performed by: PSYCHIATRY & NEUROLOGY

## 2022-01-09 PROCEDURE — 2580000003 HC RX 258: Performed by: STUDENT IN AN ORGANIZED HEALTH CARE EDUCATION/TRAINING PROGRAM

## 2022-01-09 PROCEDURE — B41F1ZZ FLUOROSCOPY OF RIGHT LOWER EXTREMITY ARTERIES USING LOW OSMOLAR CONTRAST: ICD-10-PCS | Performed by: PSYCHIATRY & NEUROLOGY

## 2022-01-09 PROCEDURE — C1760 CLOSURE DEV, VASC: HCPCS

## 2022-01-09 PROCEDURE — 2060000000 HC ICU INTERMEDIATE R&B

## 2022-01-09 PROCEDURE — 6360000004 HC RX CONTRAST MEDICATION: Performed by: PSYCHIATRY & NEUROLOGY

## 2022-01-09 RX ORDER — CLOPIDOGREL BISULFATE 75 MG/1
300 TABLET ORAL ONCE
Status: COMPLETED | OUTPATIENT
Start: 2022-01-09 | End: 2022-01-09

## 2022-01-09 RX ORDER — ASPIRIN 300 MG/1
300 SUPPOSITORY RECTAL ONCE
Status: COMPLETED | OUTPATIENT
Start: 2022-01-09 | End: 2022-01-09

## 2022-01-09 RX ORDER — IODIXANOL 270 MG/ML
100 INJECTION, SOLUTION INTRAVASCULAR
Status: COMPLETED | OUTPATIENT
Start: 2022-01-09 | End: 2022-01-09

## 2022-01-09 RX ADMIN — LEVOTHYROXINE SODIUM 200 MCG: 100 TABLET ORAL at 15:20

## 2022-01-09 RX ADMIN — ASPIRIN 300 MG: 300 SUPPOSITORY RECTAL at 11:38

## 2022-01-09 RX ADMIN — TOPIRAMATE 25 MG: 25 TABLET, FILM COATED ORAL at 21:01

## 2022-01-09 RX ADMIN — CLOPIDOGREL 300 MG: 75 TABLET, FILM COATED ORAL at 11:39

## 2022-01-09 RX ADMIN — PANTOPRAZOLE SODIUM 40 MG: 40 TABLET, DELAYED RELEASE ORAL at 15:20

## 2022-01-09 RX ADMIN — SODIUM CHLORIDE: 9 INJECTION, SOLUTION INTRAVENOUS at 00:55

## 2022-01-09 RX ADMIN — IODIXANOL 100 ML: 270 INJECTION, SOLUTION INTRAVASCULAR at 11:11

## 2022-01-09 ASSESSMENT — PAIN SCALES - GENERAL: PAINLEVEL_OUTOF10: 0

## 2022-01-09 NOTE — PROGRESS NOTES
[facility] Neurology   IN-PATIENT SERVICE      NEUROLOGY PROGRESS  NOTE            Date:   2022  Patient name:  Qing Ayala  Date of admission:  2022  YOB: 1975      Interval History:   Qing Ayala is a  42 y.o. female admitted on 2022 with Migraine syndrome [G43.909]  Cerebral aneurysm [I67.1]  Acute nonintractable headache, unspecified headache type [R51.9]. This is a follow-up neurology progress note. The patient was seen and examined and the chart was reviewed. Vitals: Stable   No acute events overnight. Magnesium level 2.1  Patient was started on Topamax 25 mg hs for migraine headache     History of Present Illness: The patient is a 42 y.o.  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 Patient has a past medical history of migraines, hypothyroidism, umbilical hernia, LAUNU-95 diagnosed 2 weeks ago. Patient's migraines described as frontal, throbbing, daily. 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 She was treated with a migraine cocktail and wished to go home. Patient presents to the emergency room again on  for change in headache with pain more focused around cranial apex.      MRI brain: Unremarkable  MRA head shows unchanged 2 mm superior projecting aneurysm at the junction of left DIPTI A1 segment and anterior communicating artery          Past Medical History:     Past Medical History:   Diagnosis Date    Heart palpitations     Hypothyroidism 2008    s/p radioiodine ablation         Past Surgical History:     Past Surgical History:   Procedure Laterality Date    BREAST ENHANCEMENT SURGERY      x3     SECTION      failure to progress    DILATION AND CURETTAGE OF UTERUS      s/p SAB x 3    HAND SURGERY Right     CRPP R fifth     UMBILICAL HERNIA REPAIR N/A [id number]    HERNIA UMBILICAL REPAIR W/MESH performed by Janine Gutierrez MD at [address]        Medications during admission:      topiramate  25 mg Oral Nightly    levothyroxine  200 mcg Oral Daily    pantoprazole  40 mg Oral Daily    sodium chloride flush  5-40 mL IntraVENous 2 times per day         Physical Exam:   /76   Pulse 56   Temp 98.5 °F (36.9 °C) (Oral)   Resp 16   Ht 5' 5\" (1.651 m)   Wt 153 lb 3.5 oz (69.5 kg)   SpO2 100%   BMI 25.50 kg/m²   Temp (24hrs), Av °F (36.7 °C), Min:97.5 °F (36.4 °C), Max:98.5 °F (36.9 °C)        General examination:      General Appearance:  alert, well appearing, and in no acute distress  HEENT: Normocephalic, atraumatic, moist mucus membranes  Neck: supple, no carotid bruits, (-) nuchal rigidity  Lungs:  Respirations unlabored, chest wall no deformity, BS normal  Cardiovascular: normal rate, regular rhythm  Abdomen: Soft, nontender, nondistended, normal bowel sounds  Skin: No gross lesions, rashes, bruising or bleeding on exposed skin area  Extremities:  peripheral pulses palpable, clubbing or edema  Psych: normal affect      Neurological examination:      Mental status   Alert and oriented x 3; following all commands;   speech is fluent, no dysarthria, aphasia.       Cranial nerves   II - visual fields intact to confrontation; pupils reactive  III, IV, VI - extraocular muscles intact; no LILLIE; no nystagmus; no ptosis   V - normal facial sensation                                                               VII - normal facial symmetry                                                             VIII - intact hearing                                                                             IX, X - symmetrical palate elevation                                               XI - symmetrical shoulder shrug                                                       XII - midline tongue without atrophy or fasciculation     Motor function Strength:   5/5 RUE, 5/5 RLE  5/5 LUE, 5/5  LLE  Normal bulk and tone. Sensory function Intact to touch,       Cerebellar Intact finger-nose-finger testing. Intact heel-shin testing. No dysdiadochokinesia present. No tremors                        Reflex function 2/4 symmetric throughout . Downgoing plantar response bilaterally. (-)Cole's sign bilaterally      Gait                  Normal station and gait. Diagnostics:      Laboratory Testing:  CBC:   Recent Labs     01/07/22  1515   WBC 5.6   HGB 10.0*   *       BMP:    Recent Labs     01/07/22  1515      K 3.5*      CO2 24   BUN 6   CREATININE 0.61   GLUCOSE 92         Lab Results   Component Value Date    CHOL 163 11/01/2013    LDLCHOLESTEROL 65 07/30/2019    HDL 58 07/30/2019    TRIG 49 11/01/2013    ALT 13 01/02/2022    AST 26 01/02/2022    TSH 4.48 01/02/2022         No results found for: PHENYTOIN, PHENYTOIN, VALPROATE, CBMZ        Imaging/Diagnostics:  CTA head and neck     Impression   1. No acute intracranial abnormality. 2. 2 mm aneurysm at the junction of the left anterior cerebral and anterior   communicating arteries. 3. No evidence of large vessel occlusion or hemodynamically significant   stenosis involving the head and neck arteries. 4. Scattered areas of patchy and ground-glass opacities are noted at the   bilateral pulmonary apices. These are nonspecific but could indicate an   atypical/viral pneumonia, inclusive of COVID-19.             MRI brain: Unremarkable    MRA head shows unchanged 2 mm superior projecting aneurysm at the junction of left DIPTI A1 segment and anterior communicating artery      Impression:      Primary Problem  <principal problem not specified>    Active Hospital Problems    Diagnosis Date Noted    Acute nonintractable headache [R51.9]     Migraine syndrome [G43.909] 01/07/2022           Assessment and plan:     Patient status Admit as inpatient in the  Progressive Unit/Step down      Impression:   - Undiagnosed Migraine   - Family history of Migraine   - 2 mm aneurysm at L DIPTI and Acom   - COVID positive  12/24/2021  - Left eye scotoma       Underwent DSA, with possible intervention  Iliac artery dissection, during the procedure  Repeat CT abdomen pelvis with groin on Tuesday evening  Topamax 25 mg hs   Normal Saline 75 ml/hr         DVT prophylaxis: Lovenox 40 mg SC  GI prophylaxis:  Protonix 40 mg   Code status: Full code    Consultations:   IP CONSULT TO ENDOVASCULAR NEUROSURGERY  PT/OT            Electronically signed by Moises Boone MD on 1/9/2022 at 7:45 AM      Dex Maria MD  PGY-3 Neurology Resident   [facility]/[place]

## 2022-01-09 NOTE — PLAN OF CARE
Problem: Pain:  Goal: Pain level will decrease  Description: Pain level will decrease  1/9/2022 0507 by Ellis Guerrero RN  Outcome: Ongoing  1/8/2022 1836 by Araceli Morin RN  Outcome: Ongoing  Goal: Control of acute pain  Description: Control of acute pain  1/9/2022 0507 by Ellis Guerrero RN  Outcome: Ongoing  1/8/2022 1836 by Araceli Morin RN  Outcome: Ongoing  Goal: Control of chronic pain  Description: Control of chronic pain  1/9/2022 0507 by Ellis Guerrero RN  Outcome: Ongoing  1/8/2022 1836 by Araceli Morin RN  Outcome: Ongoing

## 2022-01-09 NOTE — BRIEF OP NOTE
[facility]    NEUROENDOVASCULAR SERVICE: POST-OP NOTE: 1/9/2022    Pt Name: Canelo Sanchez  MRN: [id number]  YOB: 1975  Date of Procedure: 1/9/2022  Primary Care Physician: Fabio Wallis MD      Pre-Procedural Diagnosis: L A2/acom aneurysm  Post-Procedural Diagnosis: as above      Procedure Performed: cerebral angiogram, 3d reconstruction    Surgeon:   Kellee Kamara MD    Fellow:  Davide Jimenez MD, PhD    1st Assistant:  Luciano Sarmiento    PRE-PROCEDURAL EXAM:  Prestroke baseline mRS MODIFIED ROBBY SCORE: 0 - No symptoms at all. Neurological exam performed and unchanged from initial H&P or consult    Anesthesia: IV Moderate Sedation  Complications: none    Intra-Operative EXAM:  Patient sedated with unchanged limited neurological exam    EBL: < less than 50       Cc            Specimens: Were not Obtained  Contrast:     Visipaque 270 low osmolar 100 Cc             Fluoro: 10.2 min    Findings:  Please see dictated Radiology note for further details  --right and anterior facing blister L A2 DIPTI origin aneurysm, dimensions height 1.22mm, length 1.48mm, width 3.05mm, neck 3.05mm  --the femoral artery noted with retrograded dissection flap in the R external iliac artery, most likely happened during access under US with micropuncture technique, there is good anterograde flow into the distal branches. Loaded asa 325 rectal and Plavix 300        POST-PROCEDURAL EXAM :   Stable neurological Exam  Neurological exam performed and unchanged from initial H&P or consult. R DP pulse palpable    Closure:  right Vascade 5   F        POST-PROCEDURAL MONITORING : see orders  Disposition: neuro stepdown      Recommendations:  --Back to neuro stepdown  --Do not bend right leg for 3 hours. --Groin checks per protocol. --Peripheral pulse checks per protocol.   --SBP goal 100-140  --continue asa 81 and plavix 75 daily  --continue admission, in consideration for aneurysm embolization by FD on 1/12/2022. Plan to discuss case in multidisciplinary meeting.  --check cta pelvis with femoral runoff w and wo con 1/11/2022 pm, to be reviewed on 1/12/2022  --Follow up with Dr. Bairon Wolfe 2 weeks after discharge and Dr. Phuong Smith 3 months after discharge.     Ozzy Herrera MD PhD fellow    Kacy Santana MD   Pager: [phone]  Stroke, [facility]  [address] [place] [address]  Electronically signed 1/9/2022 at 11:37 AM

## 2022-01-09 NOTE — PROGRESS NOTES
Endovascular Neurosurgery Progress Note    SUBJECTIVE:     No reported events overnight. This am pt continues to have the abnormal scalp sensation, but is improved. With massaging her scalp. She denies any new focal symptoms. Review of Systems:  CONSTITUTIONAL:  negative for fevers, chills, fatigue and malaise    EYES:  negative for double vision, blurred vision and photophobia     HEENT:  negative for tinnitus, epistaxis and sore throat    RESPIRATORY:  negative for cough, shortness of breath, wheezing    CARDIOVASCULAR:  negative for chest pain, palpitations, syncope, edema    GASTROINTESTINAL:  negative for nausea, vomiting    GENITOURINARY:  negative for incontinence    MUSCULOSKELETAL:  negative for neck or back pain    NEUROLOGICAL:  Negative for weakness and tingling  negative for headaches and dizziness    PSYCHIATRIC:  negative for anxiety      Review of systems otherwise negative. OBJECTIVE:     Vitals:    01/09/22 0442   BP: 126/76   Pulse: 56   Resp: 16   Temp: 98.5 °F (36.9 °C)   SpO2: 100%        General:  Gen: normal habitus, NAD  HEENT: NCAT, mucosa moist  Cvs: RRR, S1 S2 normal  Resp: symmetric unlabored breathing  Abd: s/nd/nt  Ext: no edema  Skin: no lesions seen, warm and dry     Neuro:  Gen: awake and alert, oriented x3. Lang/speech: no aphasia or dysarthria. Follows commands. CN: PERRL, EOMI, VFF to finger count. L eye central J shaped grey scotoma. V1-3 intact, face symmetric, hearing intact, shoulder shrug symmetric, tongue midline  Motor: grossly 5/5 UE and LE b/l  Sense: LT intact in all 4 ext. Coord: FTN and HTS intact b/l  DTR: deferred  Gait: deferred    NIH Stroke Scale:   1a  Level of consciousness: 0 - alert; keenly responsive   1b. LOC questions:  0 - answers both questions correctly   1c. LOC commands: 0 - performs both tasks correctly   2. Best Gaze: 0 - normal   3. Visual: 1 - partial hemianopia   4. Facial Palsy: 0 - normal symmetric movement   5a.  Motor left arm: 0 - no drift, limb holds 90 (or 45) degrees for full 10 seconds   5b. Motor right arm: 0 - no drift, limb holds 90 (or 45) degrees for full 10 seconds   6a. Motor left le - no drift; leg holds 30 degree position for full 5 seconds   6b  Motor right le - no drift; leg holds 30 degree position for full 5 seconds   7. Limb Ataxia: 0 - absent   8. Sensory: 0 - normal; no sensory loss   9. Best Language:  0 - no aphasia, normal   10. Dysarthria: 0 - normal   11. Extinction and Inattention: 0 - no abnormality         Total:   1     MRS: 1      LABS:   Reviewed. Lab Results   Component Value Date    HGB 10.0 (L) 2022    WBC 5.6 2022     (H) 2022     2022    BUN 6 2022    CREATININE 0.61 2022    AST 26 2022    ALT 13 2022    MG 2.1 2022      Lab Results   Component Value Date    COVID19 Not Detected 2022    COVID19 Not Detected 11/10/2020       RADIOLOGY:   Images were personally reviewed including:    MRA Head w/o contrast 2022  Unchanged 2 mm superior projecting aneurysm at the junction of left  DIPTI A1 segment and anterior communicating artery. No hemodynamically significant stenosis. MRI brain w/o contrast 2022  Unremarkable brain MRI. [facility] Pilon is no acute infarction, intracranial  hemorrhage, or intracranial mass lesion. CT/A head and neck w con 1/3/2022  1. No acute intracranial abnormality. 2. 2 mm aneurysm at the junction of the left anterior cerebral and anterior  communicating arteries. 3. No evidence of large vessel occlusion or hemodynamically significant  stenosis involving the head and neck arteries. 4. Scattered areas of patchy and ground-glass opacities are noted at the  bilateral pulmonary apices. These are nonspecific but could indicate an  atypical/viral pneumonia, inclusive of COVID-19. ASSESSMENT:     41yo female with pmh of hypothyroid, migraine, COVID19+ 2021.   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 reasonable to check dx dsa given persistent symptoms. MRI/MRA completed showing 2 mm aneurysm location at junction of left DIPTI and ACOMM. Recommendations:     --dx dsa today. Npo since midnight. Pt is consented. --sbp < 140  --f/u dr Travis Obrien 2w after discharge, dr Jaclyn Calles 3 mo after discharge.     Anish Cazares MD PhD  Stroke, [facility]  [facility]  Electronically signed 1/9/2022 at 8:55 AM

## 2022-01-09 NOTE — SEDATION DOCUMENTATION
Neuro assessment done no change. Pulses strong with doppler, foot warm to touch.  Aspirin 300mg rectal and Plavix 300mg given to pt

## 2022-01-10 LAB
ABSOLUTE EOS #: 0.09 K/UL (ref 0–0.44)
ABSOLUTE IMMATURE GRANULOCYTE: 0.08 K/UL (ref 0–0.3)
ABSOLUTE LYMPH #: 1.86 K/UL (ref 1.1–3.7)
ABSOLUTE MONO #: 0.29 K/UL (ref 0.1–1.2)
ANION GAP SERPL CALCULATED.3IONS-SCNC: 13 MMOL/L (ref 9–17)
BASOPHILS # BLD: 1 % (ref 0–2)
BASOPHILS ABSOLUTE: 0.04 K/UL (ref 0–0.2)
BUN BLDV-MCNC: 12 MG/DL (ref 6–20)
BUN/CREAT BLD: ABNORMAL (ref 9–20)
CALCIUM SERPL-MCNC: 8.6 MG/DL (ref 8.6–10.4)
CHLORIDE BLD-SCNC: 101 MMOL/L (ref 98–107)
CO2: 18 MMOL/L (ref 20–31)
CREAT SERPL-MCNC: 0.56 MG/DL (ref 0.5–0.9)
DIFFERENTIAL TYPE: ABNORMAL
EOSINOPHILS RELATIVE PERCENT: 2 % (ref 1–4)
GFR AFRICAN AMERICAN: >60 ML/MIN
GFR NON-AFRICAN AMERICAN: >60 ML/MIN
GFR SERPL CREATININE-BSD FRML MDRD: ABNORMAL ML/MIN/{1.73_M2}
GFR SERPL CREATININE-BSD FRML MDRD: ABNORMAL ML/MIN/{1.73_M2}
GLUCOSE BLD-MCNC: 105 MG/DL (ref 70–99)
HCT VFR BLD CALC: 34.1 % (ref 36.3–47.1)
HEMOGLOBIN: 10.5 G/DL (ref 11.9–15.1)
IMMATURE GRANULOCYTES: 1 %
LYMPHOCYTES # BLD: 34 % (ref 24–43)
MCH RBC QN AUTO: 25.6 PG (ref 25.2–33.5)
MCHC RBC AUTO-ENTMCNC: 30.8 G/DL (ref 28.4–34.8)
MCV RBC AUTO: 83.2 FL (ref 82.6–102.9)
MONOCYTES # BLD: 5 % (ref 3–12)
NRBC AUTOMATED: 0 PER 100 WBC
PDW BLD-RTO: 15.4 % (ref 11.8–14.4)
PLATELET # BLD: 655 K/UL (ref 138–453)
PLATELET ESTIMATE: ABNORMAL
PMV BLD AUTO: 8.4 FL (ref 8.1–13.5)
POTASSIUM SERPL-SCNC: 4.1 MMOL/L (ref 3.7–5.3)
RBC # BLD: 4.1 M/UL (ref 3.95–5.11)
RBC # BLD: ABNORMAL 10*6/UL
SEG NEUTROPHILS: 57 % (ref 36–65)
SEGMENTED NEUTROPHILS ABSOLUTE COUNT: 3.17 K/UL (ref 1.5–8.1)
SODIUM BLD-SCNC: 132 MMOL/L (ref 135–144)
WBC # BLD: 5.5 K/UL (ref 3.5–11.3)
WBC # BLD: ABNORMAL 10*3/UL

## 2022-01-10 PROCEDURE — 99233 SBSQ HOSP IP/OBS HIGH 50: CPT | Performed by: PSYCHIATRY & NEUROLOGY

## 2022-01-10 PROCEDURE — 6370000000 HC RX 637 (ALT 250 FOR IP): Performed by: STUDENT IN AN ORGANIZED HEALTH CARE EDUCATION/TRAINING PROGRAM

## 2022-01-10 PROCEDURE — 85025 COMPLETE CBC W/AUTO DIFF WBC: CPT

## 2022-01-10 PROCEDURE — 99232 SBSQ HOSP IP/OBS MODERATE 35: CPT | Performed by: PSYCHIATRY & NEUROLOGY

## 2022-01-10 PROCEDURE — 36415 COLL VENOUS BLD VENIPUNCTURE: CPT

## 2022-01-10 PROCEDURE — APPSS30 APP SPLIT SHARED TIME 16-30 MINUTES: Performed by: NURSE PRACTITIONER

## 2022-01-10 PROCEDURE — 2580000003 HC RX 258: Performed by: STUDENT IN AN ORGANIZED HEALTH CARE EDUCATION/TRAINING PROGRAM

## 2022-01-10 PROCEDURE — 80048 BASIC METABOLIC PNL TOTAL CA: CPT

## 2022-01-10 PROCEDURE — 2060000000 HC ICU INTERMEDIATE R&B

## 2022-01-10 RX ORDER — ASPIRIN 81 MG/1
81 TABLET, CHEWABLE ORAL DAILY
Status: DISCONTINUED | OUTPATIENT
Start: 2022-01-10 | End: 2022-01-12 | Stop reason: HOSPADM

## 2022-01-10 RX ORDER — CLOPIDOGREL BISULFATE 75 MG/1
75 TABLET ORAL DAILY
Status: DISCONTINUED | OUTPATIENT
Start: 2022-01-10 | End: 2022-01-12 | Stop reason: HOSPADM

## 2022-01-10 RX ADMIN — CLOPIDOGREL 75 MG: 75 TABLET, FILM COATED ORAL at 10:06

## 2022-01-10 RX ADMIN — LEVOTHYROXINE SODIUM 125 MCG: 100 TABLET ORAL at 09:00

## 2022-01-10 RX ADMIN — SODIUM CHLORIDE, PRESERVATIVE FREE 10 ML: 5 INJECTION INTRAVENOUS at 20:44

## 2022-01-10 RX ADMIN — SODIUM CHLORIDE, PRESERVATIVE FREE 10 ML: 5 INJECTION INTRAVENOUS at 09:30

## 2022-01-10 RX ADMIN — TOPIRAMATE 25 MG: 25 TABLET, FILM COATED ORAL at 20:44

## 2022-01-10 RX ADMIN — ACETAMINOPHEN 650 MG: 325 TABLET ORAL at 03:58

## 2022-01-10 RX ADMIN — ASPIRIN 81 MG: 81 TABLET, CHEWABLE ORAL at 10:06

## 2022-01-10 RX ADMIN — PANTOPRAZOLE SODIUM 40 MG: 40 TABLET, DELAYED RELEASE ORAL at 10:07

## 2022-01-10 ASSESSMENT — PAIN SCALES - GENERAL
PAINLEVEL_OUTOF10: 3
PAINLEVEL_OUTOF10: 0

## 2022-01-10 NOTE — PROGRESS NOTES
Endovascular Neurosurgery Progress Note    SUBJECTIVE:     Patient seen and examined at bedside. States that she had a headache overnight which was relieved with Tylenol rating it a 3/10 when it occurred. When she has been walking she tells me that her RLE is tingling but subsides when in bed. Review of Systems:  CONSTITUTIONAL:  negative for fevers, chills, fatigue and malaise    EYES:  negative for double vision, blurred vision and photophobia     HEENT:  negative for tinnitus, epistaxis and sore throat    RESPIRATORY:  negative for cough, shortness of breath, wheezing    CARDIOVASCULAR:  negative for chest pain, palpitations, syncope, edema    GASTROINTESTINAL:  negative for nausea, vomiting    GENITOURINARY:  negative for incontinence    MUSCULOSKELETAL:  negative for neck or back pain    NEUROLOGICAL:  Tingling on RLE when walking  negative for headaches and dizziness    PSYCHIATRIC:  negative for anxiety      Review of systems otherwise negative. OBJECTIVE:     Vitals:    01/10/22 0735   BP: 98/68   Pulse: 62   Resp: 15   Temp: 97.5 °F (36.4 °C)   SpO2: 95%        Gen: normal habitus, NAD  HEENT: NCAT, mucosa moist  Cvs: RRR, S1 S2 normal,   Resp: symmetric unlabored breathing  Abd: s/nd/nt  Ext: no edema  Skin: Puncture site from angiogram is dry and covered. No hematoma present. Pulses 2+ RLE      Neuro:  Gen: awake and alert, oriented x3. Lang/speech: no aphasia or dysarthria. Follows commands. CN: PERRL, EOMI, VFF to finger count. L eye central J shaped grey scotoma. V1-3 intact, face symmetric, hearing intact, shoulder shrug symmetric, tongue midline  Motor: grossly 5/5 UE and LE b/l  Sense: LT intact in all 4 ext. Coord: FTN and HTS intact b/l  DTR: deferred  Gait: narrow base gait    NIH Stroke Scale:   1a  Level of consciousness: 0 - alert; keenly responsive   1b. LOC questions:  0 - answers both questions correctly   1c. LOC commands: 0 - performs both tasks correctly   2.   Best Gaze: 0 - normal   3. Visual: 1 - partial hemianopia   4. Facial Palsy: 0 - normal symmetric movement   5a. Motor left arm: 0 - no drift, limb holds 90 (or 45) degrees for full 10 seconds   5b. Motor right arm: 0 - no drift, limb holds 90 (or 45) degrees for full 10 seconds   6a. Motor left le - no drift; leg holds 30 degree position for full 5 seconds   6b  Motor right le - no drift; leg holds 30 degree position for full 5 seconds   7. Limb Ataxia: 0 - absent   8. Sensory: 0 - normal; no sensory loss   9. Best Language:  0 - no aphasia, normal   10. Dysarthria: 0 - normal   11. Extinction and Inattention: 0 - no abnormality         Total:   1     MRS: 1      LABS:   Reviewed. Lab Results   Component Value Date    HGB 10.0 (L) 2022    WBC 5.6 2022     (H) 2022     2022    BUN 6 2022    CREATININE 0.61 2022    AST 26 2022    ALT 13 2022    MG 2.1 2022      Lab Results   Component Value Date    COVID19 Not Detected 2022    COVID19 Not Detected 11/10/2020       RADIOLOGY:   Images were personally reviewed including:      MRA Head w/o contrast 2022  Unchanged 2 mm superior projecting aneurysm at the junction of left  DIPTI A1 segment and anterior communicating artery. No hemodynamically significant stenosis.     MRI brain w/o contrast 2022  Unremarkable brain MRI. [facility] is no acute infarction, intracranial  hemorrhage, or intracranial mass lesion.     CT/A head and neck w con 1/3/2022  1. No acute intracranial abnormality. 2. 2 mm aneurysm at the junction of the left anterior cerebral and anterior  communicating arteries. 3. No evidence of large vessel occlusion or hemodynamically significant  stenosis involving the head and neck arteries. 4. Scattered areas of patchy and ground-glass opacities are noted at the  bilateral pulmonary apices.  These are nonspecific but could indicate an  atypical/viral pneumonia, inclusive of COVID-19. ASSESSMENT:     39yo female with pmh of hypothyroid, migraine, COVID19+ 12/24/2021.  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 reasonable to check dx dsa given persistent symptoms. MRI/MRA completed showing 2 mm aneurysm location at junction of left DIPTI and ACOMM. 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 Loaded asa 325 rectal and Plavix 300. Recommendations:      --sbp 110-140  -- Continue ASA 81 and Plavix 75 mg daily  --Possible ACOM aneurysm embolization by FD on 1/12/2022. Plan to discuss case in multidisciplinary meeting.  --check cta pelvis with femoral runoff w and wo con 1/11/2022 pm, to be reviewed on 1/12/2022  --f/u Dr Shelli Stanley 2w after discharge, Dr Fuad Gant 3 mo after discharge. Romeo Garduno, MS4  Stroke, [facility]  Jossy Espinal  Electronically signed 1/10/2022 at 8:28 AM     Agree with above. Recommendations as noted   Case was discussed with Dr. Patrick Chadwick.      Danuta Schultz MD, Fellow  Stroke, [facility]  [id number]

## 2022-01-10 NOTE — PROGRESS NOTES
NEUROLOGY INPATIENT PROGRESS NOTE    1/10/2022         Current Exam:     Chart reviewed. Discussed with RN. Patient headache is currently resolved. Planning for CTA abdomen/pelvis tomorrow to follow up on dissecting flap. Endovascular considering intervention for aneurysm; timing still to be finalized. Patient has no acute complaints currently. Is able to ambulate, no vision changes. Brief History:    Chiquita Angel is a  42 y.o. 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 She describes her migraines as frontal, throbbing, and daily for the last several months. 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 She was treated with migraine cocktail and wished to go home but presented to the emergency room again on 1/7 for a change in the headache with more intense pain focused around the cranial apex. Upon further discussion with the patient it was found that she has had headaches most of her life and given her daily headaches at home for the last several months she was started on prophylactic Topamax 25 mg nightly. MRI brain was unremarkable. MRA shows a unchanged 2 mm superior projecting aneurysm at the junction of the left DIPTI A1 segment and anterior communicating artery. Endovascular was consulted and took the patient for an angiogram on 1/9. 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 She was continued on aspirin 81 mg and Plavix 75 mg daily and is in consideration for aneurysm embolization by FD on 1/12/2022 per endovascular note. CT pelvis with femoral runoff with and without contrast also ordered for 1/12/2022.        No current facility-administered medications on file prior to encounter. Current Outpatient Medications on File Prior to Encounter   Medication Sig Dispense Refill    ondansetron (ZOFRAN ODT) 4 MG disintegrating tablet Take 1 tablet by mouth every 8 hours as needed for Nausea or Vomiting 21 tablet 0    guaiFENesin-dextromethorphan (ROBITUSSIN DM) 100-10 MG/5ML syrup Take 5 mLs by mouth 3 times daily as needed for Cough 120 mL 0    Multiple Vitamins-Minerals (THERAPEUTIC MULTIVITAMIN-MINERALS) tablet Take 1 tablet by mouth daily      levothyroxine (SYNTHROID) 200 MCG tablet Take 0.5 tablets by mouth Daily. (Patient taking differently: Take 125 mcg by mouth Daily ) 30 tablet 3       Allergies: Mirza Sandy is allergic to bee venom and percocet [oxycodone-acetaminophen]. Past Medical History:   Diagnosis Date    Heart palpitations     Hypothyroidism     s/p radioiodine ablation        Past Surgical History:   Procedure Laterality Date    BREAST ENHANCEMENT SURGERY      x3     SECTION      failure to progress    DILATION AND CURETTAGE OF UTERUS      s/p SAB x 3    HAND SURGERY Right     CRPP R fifth MC    UMBILICAL HERNIA REPAIR N/A 3355    HERNIA UMBILICAL REPAIR W/MESH performed by Luz Alfredo MD at [address] [place] [address] History: Mirza Sandy  reports that she has quit smoking. Her smoking use included cigarettes. She has never used smokeless tobacco. She reports that she does not drink alcohol and does not use drugs.     Family History   Problem Relation Age of Onset    Diabetes Other        Objective:   BP 98/68   Pulse 62   Temp 97.5 °F (36.4 °C) (Oral)   Resp 15   Ht 5' 5\" (1.651 m)   Wt 153 lb 3.5 oz (69.5 kg)   SpO2 95%   BMI 25.50 kg/m²     Blood pressure range: Systolic (65AMU), JY , Min:93 , BGK:382   ; Diastolic (94UFZ), AUL:99, Min:53, Max:90      Review of Systems:  Constitutional  Negative for fever and chills    HEENT  Negative for ear discharge, ear pain, nosebleed    Eyes  Negative for photophobia, pain left anterior cerebral and anterior   communicating arteries. 3. No evidence of large vessel occlusion or hemodynamically significant   stenosis involving the head and neck arteries. 4. Scattered areas of patchy and ground-glass opacities are noted at the   bilateral pulmonary apices. These are nonspecific but could indicate an   atypical/viral pneumonia, inclusive of COVID-19. MRI BRAIN/MRA HEAD (1/7/22) -  MRA head: Unchanged 2 mm superior projecting aneurysm at the junction of left   DIPTI A1 segment and anterior communicating artery.       No hemodynamically significant stenosis.       Brain MRI:       Unremarkable brain MRI. Ahmed Ship is no acute infarction, intracranial   hemorrhage, or intracranial mass lesion. CEREBRAL ANGIOGRAM (1/9/22) -  Please see dictated Radiology note for further details  --right and anterior facing blister L A2 DIPTI origin aneurysm, dimensions height 1.22mm, length 1.48mm, width 3.05mm, neck 3.05mm  --dissection flap in the R external iliac artery, there is good anterograde flow into the distal branches. 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 Although every effort was made to ensure the accuracy of this automated transcription, some errors in transcription may have occurred.

## 2022-01-11 ENCOUNTER — APPOINTMENT (OUTPATIENT)
Dept: CT IMAGING | Age: 47
DRG: 054 | End: 2022-01-11
Payer: COMMERCIAL

## 2022-01-11 LAB
ABSOLUTE EOS #: 0.1 K/UL (ref 0–0.44)
ABSOLUTE IMMATURE GRANULOCYTE: 0.07 K/UL (ref 0–0.3)
ABSOLUTE LYMPH #: 2.42 K/UL (ref 1.1–3.7)
ABSOLUTE MONO #: 0.44 K/UL (ref 0.1–1.2)
ANION GAP SERPL CALCULATED.3IONS-SCNC: 13 MMOL/L (ref 9–17)
BASOPHILS # BLD: 1 % (ref 0–2)
BASOPHILS ABSOLUTE: 0.03 K/UL (ref 0–0.2)
BUN BLDV-MCNC: 12 MG/DL (ref 6–20)
BUN/CREAT BLD: NORMAL (ref 9–20)
CALCIUM SERPL-MCNC: 9.1 MG/DL (ref 8.6–10.4)
CHLORIDE BLD-SCNC: 103 MMOL/L (ref 98–107)
CO2: 20 MMOL/L (ref 20–31)
CREAT SERPL-MCNC: 0.5 MG/DL (ref 0.5–0.9)
DIFFERENTIAL TYPE: ABNORMAL
EKG ATRIAL RATE: 64 BPM
EKG P AXIS: 52 DEGREES
EKG P-R INTERVAL: 142 MS
EKG Q-T INTERVAL: 404 MS
EKG QRS DURATION: 82 MS
EKG QTC CALCULATION (BAZETT): 416 MS
EKG R AXIS: 49 DEGREES
EKG T AXIS: 52 DEGREES
EKG VENTRICULAR RATE: 64 BPM
EOSINOPHILS RELATIVE PERCENT: 2 % (ref 1–4)
GFR AFRICAN AMERICAN: >60 ML/MIN
GFR NON-AFRICAN AMERICAN: >60 ML/MIN
GFR SERPL CREATININE-BSD FRML MDRD: NORMAL ML/MIN/{1.73_M2}
GFR SERPL CREATININE-BSD FRML MDRD: NORMAL ML/MIN/{1.73_M2}
GLUCOSE BLD-MCNC: 96 MG/DL (ref 70–99)
HCT VFR BLD CALC: 33.1 % (ref 36.3–47.1)
HEMOGLOBIN: 10.3 G/DL (ref 11.9–15.1)
IMMATURE GRANULOCYTES: 1 %
LYMPHOCYTES # BLD: 42 % (ref 24–43)
MAGNESIUM: 2.4 MG/DL (ref 1.6–2.6)
MCH RBC QN AUTO: 26.1 PG (ref 25.2–33.5)
MCHC RBC AUTO-ENTMCNC: 31.1 G/DL (ref 28.4–34.8)
MCV RBC AUTO: 83.8 FL (ref 82.6–102.9)
MONOCYTES # BLD: 8 % (ref 3–12)
NRBC AUTOMATED: 0 PER 100 WBC
PDW BLD-RTO: 15.7 % (ref 11.8–14.4)
PHOSPHORUS: 3.8 MG/DL (ref 2.6–4.5)
PLATELET # BLD: 637 K/UL (ref 138–453)
PLATELET ESTIMATE: ABNORMAL
PMV BLD AUTO: 8.7 FL (ref 8.1–13.5)
POTASSIUM SERPL-SCNC: 4.2 MMOL/L (ref 3.7–5.3)
RBC # BLD: 3.95 M/UL (ref 3.95–5.11)
RBC # BLD: ABNORMAL 10*6/UL
SEG NEUTROPHILS: 46 % (ref 36–65)
SEGMENTED NEUTROPHILS ABSOLUTE COUNT: 2.7 K/UL (ref 1.5–8.1)
SODIUM BLD-SCNC: 136 MMOL/L (ref 135–144)
THYROXINE, FREE: 1.21 NG/DL (ref 0.93–1.7)
TROPONIN INTERP: NORMAL
TROPONIN T: NORMAL NG/ML
TROPONIN, HIGH SENSITIVITY: <6 NG/L (ref 0–14)
WBC # BLD: 5.8 K/UL (ref 3.5–11.3)
WBC # BLD: ABNORMAL 10*3/UL

## 2022-01-11 PROCEDURE — 84439 ASSAY OF FREE THYROXINE: CPT

## 2022-01-11 PROCEDURE — 85025 COMPLETE CBC W/AUTO DIFF WBC: CPT

## 2022-01-11 PROCEDURE — APPSS30 APP SPLIT SHARED TIME 16-30 MINUTES: Performed by: NURSE PRACTITIONER

## 2022-01-11 PROCEDURE — 99232 SBSQ HOSP IP/OBS MODERATE 35: CPT | Performed by: PSYCHIATRY & NEUROLOGY

## 2022-01-11 PROCEDURE — 84484 ASSAY OF TROPONIN QUANT: CPT

## 2022-01-11 PROCEDURE — 2580000003 HC RX 258: Performed by: STUDENT IN AN ORGANIZED HEALTH CARE EDUCATION/TRAINING PROGRAM

## 2022-01-11 PROCEDURE — 80048 BASIC METABOLIC PNL TOTAL CA: CPT

## 2022-01-11 PROCEDURE — 6370000000 HC RX 637 (ALT 250 FOR IP): Performed by: STUDENT IN AN ORGANIZED HEALTH CARE EDUCATION/TRAINING PROGRAM

## 2022-01-11 PROCEDURE — 74174 CTA ABD&PLVS W/CONTRAST: CPT

## 2022-01-11 PROCEDURE — 93005 ELECTROCARDIOGRAM TRACING: CPT | Performed by: STUDENT IN AN ORGANIZED HEALTH CARE EDUCATION/TRAINING PROGRAM

## 2022-01-11 PROCEDURE — 6360000004 HC RX CONTRAST MEDICATION: Performed by: STUDENT IN AN ORGANIZED HEALTH CARE EDUCATION/TRAINING PROGRAM

## 2022-01-11 PROCEDURE — 2060000000 HC ICU INTERMEDIATE R&B

## 2022-01-11 PROCEDURE — 84100 ASSAY OF PHOSPHORUS: CPT

## 2022-01-11 PROCEDURE — 99233 SBSQ HOSP IP/OBS HIGH 50: CPT | Performed by: PSYCHIATRY & NEUROLOGY

## 2022-01-11 PROCEDURE — 36415 COLL VENOUS BLD VENIPUNCTURE: CPT

## 2022-01-11 PROCEDURE — 83735 ASSAY OF MAGNESIUM: CPT

## 2022-01-11 RX ORDER — CHOLECALCIFEROL (VITAMIN D3) 125 MCG
5 CAPSULE ORAL NIGHTLY PRN
Status: DISCONTINUED | OUTPATIENT
Start: 2022-01-11 | End: 2022-01-12 | Stop reason: HOSPADM

## 2022-01-11 RX ADMIN — Medication 5 MG: at 22:56

## 2022-01-11 RX ADMIN — IOPAMIDOL 75 ML: 755 INJECTION, SOLUTION INTRAVENOUS at 17:29

## 2022-01-11 RX ADMIN — PANTOPRAZOLE SODIUM 40 MG: 40 TABLET, DELAYED RELEASE ORAL at 09:47

## 2022-01-11 RX ADMIN — CLOPIDOGREL 75 MG: 75 TABLET, FILM COATED ORAL at 09:47

## 2022-01-11 RX ADMIN — SODIUM CHLORIDE, PRESERVATIVE FREE 10 ML: 5 INJECTION INTRAVENOUS at 09:47

## 2022-01-11 RX ADMIN — ASPIRIN 81 MG: 81 TABLET, CHEWABLE ORAL at 09:47

## 2022-01-11 RX ADMIN — SODIUM CHLORIDE, PRESERVATIVE FREE 10 ML: 5 INJECTION INTRAVENOUS at 21:06

## 2022-01-11 RX ADMIN — LEVOTHYROXINE SODIUM 125 MCG: 100 TABLET ORAL at 08:38

## 2022-01-11 ASSESSMENT — PAIN DESCRIPTION - PROGRESSION

## 2022-01-11 NOTE — PROGRESS NOTES
NEUROLOGY INPATIENT PROGRESS NOTE    1/11/2022         Current Exam:     Chart reviewed. Discussed with RN. CTA abd/pelvis tonight. Endovascular following for possible intervention tomorrow 1/12. Patient reports resolved headache, she continues to have J shaped negative scotoma that is unchanged. Will discontinue Topamax due to reported heart palpitations overnight. Brief History:    Richard Henderson is a  42 y.o. 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 She describes her migraines as frontal, throbbing, and daily for the last several months. 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 She was treated with migraine cocktail and wished to go home but presented to the emergency room again on 1/7 for a change in the headache with more intense pain focused around the cranial apex. Upon further discussion with the patient it was found that she has had headaches most of her life and given her daily headaches at home for the last several months she was started on prophylactic Topamax 25 mg nightly. MRI brain was unremarkable. MRA shows a unchanged 2 mm superior projecting aneurysm at the junction of the left DIPTI A1 segment and anterior communicating artery. Endovascular was consulted and took the patient for an angiogram on 1/9. 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 She was continued on aspirin 81 mg and Plavix 75 mg daily and is in consideration for aneurysm embolization by FD on 1/12/2022 per endovascular note. CT pelvis with femoral runoff with and without contrast also ordered for 1/12/2022. No current facility-administered medications on file prior to encounter. Current Outpatient Medications on File Prior to Encounter   Medication Sig Dispense Refill    guaiFENesin-dextromethorphan (ROBITUSSIN DM) 100-10 MG/5ML syrup Take 5 mLs by mouth 3 times daily as needed for Cough 120 mL 0    Multiple Vitamins-Minerals (THERAPEUTIC MULTIVITAMIN-MINERALS) tablet Take 1 tablet by mouth daily      levothyroxine (SYNTHROID) 200 MCG tablet Take 0.5 tablets by mouth Daily. (Patient taking differently: Take 125 mcg by mouth Daily ) 30 tablet 3       Allergies: Qing Ayala is allergic to bee venom and percocet [oxycodone-acetaminophen]. Past Medical History:   Diagnosis Date    Heart palpitations     Hypothyroidism     s/p radioiodine ablation        Past Surgical History:   Procedure Laterality Date    BREAST ENHANCEMENT SURGERY      x3     SECTION      failure to progress    DILATION AND CURETTAGE OF UTERUS      s/p SAB x 3    HAND SURGERY Right     CRPP R fifth MC    UMBILICAL HERNIA REPAIR N/A 297    HERNIA UMBILICAL REPAIR W/MESH performed by Suzie Rajan MD at [place] [id number] History: Qing Ayala  reports that she has quit smoking. Her smoking use included cigarettes. She has never used smokeless tobacco. She reports that she does not drink alcohol and does not use drugs.     Family History   Problem Relation Age of Onset    Diabetes Other        Objective:   BP (!) 96/51   Pulse 61   Temp 97.7 °F (36.5 °C) (Oral)   Resp 17   Ht 5' 5\" (1.651 m)   Wt 153 lb 3.5 oz (69.5 kg)   SpO2 96%   BMI 25.50 kg/m²     Blood pressure range: Systolic (23AIZ), QVT:680 , Min:96 , RBZ:687   ; Diastolic (97ETQ), ZVN:36, Min:51, Max:74      Review of Systems:  Constitutional  Negative for fever and chills    HEENT  Negative for ear discharge, ear pain, nosebleed    Eyes  Negative for photophobia, pain and discharge    Respiratory  Negative for hemoptysis and sputum    Cardiovascular  Negative for orthopnea, claudication and PND    Gastrointestinal Negative for abdominal pain, diarrhea, blood in stool    Musculoskeletal  Negative for joint pain, negative for myalgia    Skin  Negative for rash or itching    Endo/heme/allergies  Negative for polydipsia, environmental allergy    Psychiatric/behavioral  Negative for suicidal ideation. Patient is not anxious        NEUROLOGIC EXAMINATION  GENERAL  Appears comfortable and in no distress   HEENT  NC/ AT   NECK  Supple   MENTAL STATUS:  Alert, oriented, intact memory, no confusion, normal speech, normal language, no hallucination or delusion   CRANIAL NERVES: II     -      Visual fields intact to confrontation  III,IV,VI -  EOMs full, no afferent defect, no LILLIE, no ptosis  V     -     Normal facial sensation  VII    -     Normal facial symmetry  VIII   -     Intact hearing  IX,X -     Symmetrical palate  XI    -     Symmetrical shoulder shrug  XII   -     Midline tongue, no atrophy    MOTOR FUNCTION:  Moves all limbs easily with normal bulk, normal tone and no involuntary movements, no tremor   SENSORY FUNCTION:  Normal touch, normal pin   CEREBELLAR FUNCTION:  Intact fine motor control over upper limbs   REFLEX FUNCTION:  Symmetric, no perverted reflex, no Babinski sign   STATION and GAIT  Steady gait       Data:    Lab Results:   CBC:   Recent Labs     01/10/22  1903   WBC 5.5   HGB 10.5*   *     BMP:    Recent Labs     01/10/22  1903   *   K 4.1      CO2 18*   BUN 12   CREATININE 0.56   GLUCOSE 105*         Lab Results   Component Value Date    CHOL 163 11/01/2013    LDLCHOLESTEROL 65 07/30/2019    HDL 58 07/30/2019    TRIG 49 11/01/2013    ALT 13 01/02/2022    AST 26 01/02/2022    TSH 4.48 01/02/2022           Diagnostic data reviewed:  CT HEAD/CTA HEAD AND NECK (1/3/22) -  1. No acute intracranial abnormality. 2. 2 mm aneurysm at the junction of the left anterior cerebral and anterior   communicating arteries.    3. No evidence of large vessel occlusion or hemodynamically significant stenosis involving the head and neck arteries. 4. Scattered areas of patchy and ground-glass opacities are noted at the   bilateral pulmonary apices. These are nonspecific but could indicate an   atypical/viral pneumonia, inclusive of COVID-19. MRI BRAIN/MRA HEAD (1/7/22) -  MRA head: Unchanged 2 mm superior projecting aneurysm at the junction of left   DIPTI A1 segment and anterior communicating artery.       No hemodynamically significant stenosis.       Brain MRI:       Unremarkable brain MRI. Alberto Mention is no acute infarction, intracranial   hemorrhage, or intracranial mass lesion. CEREBRAL ANGIOGRAM (1/9/22) -  Please see dictated Radiology note for further details  --right and anterior facing blister L A2 DIPTI origin aneurysm, dimensions height 1.22mm, length 1.48mm, width 3.05mm, neck 3.05mm  --dissection flap in the R external iliac artery, there is good anterograde flow into the distal branches. 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 Final decision tomorrow  -Continue groin checks; groin with no sign of ecchymosis on today's exam  -Will discontinue Topamax due to palpitations. Patient requests nothing further for headache as this time  -DVT prophylaxis; Lovenox    Please note that this note was generated using a voice recognition dictation software. Although every effort was made to ensure the accuracy of this automated transcription, some errors in transcription may have occurred.

## 2022-01-11 NOTE — CARE COORDINATION
[facility] Quality Flow/Interdisciplinary Rounds Progress Note    Quality Flow Rounds held on January 11, 2022 at [address] Attending:  Bedside Nurse, ,  and Nursing Unit Leadership    Barriers to Discharge: CTA today, Possible ACOM aneurysm embolization tomorrow     Anticipated Discharge Date:  Expected Discharge Date: 01/10/22    Anticipated Discharge Disposition: home independent. No needs     Readmission Risk              Risk of Unplanned Readmission:  14           Discussed patient goal for the day, patient clinical progression, and barriers to discharge. The following Goal(s) of the Day/Commitment(s) have been identified:  Embolization plan    Patient currently at CT; Home goal per previous note.      Audie Servin RN  January 11, 2022

## 2022-01-11 NOTE — PLAN OF CARE
Problem: Pain:  Goal: Pain level will decrease  Description: Pain level will decrease  1/11/2022 0705 by Lincoln Camarillo RN  Outcome: Ongoing  1/11/2022 0704 by Lincoln Camarillo RN  Outcome: Ongoing  Goal: Control of acute pain  Description: Control of acute pain  1/11/2022 0705 by Lincoln Camarillo RN  Outcome: Ongoing  1/11/2022 0704 by Licnoln Camarillo RN  Outcome: Ongoing  Goal: Control of chronic pain  Description: Control of chronic pain  1/11/2022 0705 by Lincoln Camarillo RN  Outcome: Ongoing  1/11/2022 0704 by Lincoln Camarillo RN  Outcome: Ongoing

## 2022-01-11 NOTE — PROGRESS NOTES
Endovascular Neurosurgery Progress Note    SUBJECTIVE:     Patient seen and examined at bedside. States that she had a headache overnight which was relieved with Tylenol rating it a 3/10 when it occurred. Review of Systems:  CONSTITUTIONAL:  negative for fevers, chills, fatigue and malaise    EYES:  negative for double vision, blurred vision and photophobia     HEENT:  negative for tinnitus, epistaxis and sore throat    RESPIRATORY:  negative for cough, shortness of breath, wheezing    CARDIOVASCULAR:  negative for chest pain, palpitations, syncope, edema    GASTROINTESTINAL:  negative for nausea, vomiting    GENITOURINARY:  negative for incontinence    MUSCULOSKELETAL:  negative for neck or back pain    NEUROLOGICAL:  Tingling on RLE when walking  negative for headaches and dizziness    PSYCHIATRIC:  negative for anxiety      Review of systems otherwise negative. OBJECTIVE:     Vitals:    01/11/22 0856   BP: (!) 96/51   Pulse: 61   Resp: 17   Temp: 97.7 °F (36.5 °C)   SpO2: 96%        Gen: normal habitus, NAD  HEENT: NCAT, mucosa moist  Cvs: RRR, S1 S2 normal,   Resp: symmetric unlabored breathing  Abd: s/nd/nt  Ext: no edema  Skin: Puncture site from angiogram is dry and covered. No hematoma present. Pulses 2+ RLE      Neuro:  Gen: awake and alert, oriented x3. Lang/speech: no aphasia or dysarthria. Follows commands. CN: PERRL, EOMI, VFF to finger count. L eye central J shaped grey scotoma. V1-3 intact, face symmetric, hearing intact, shoulder shrug symmetric, tongue midline  Motor: grossly 5/5 UE and LE b/l  Sense: LT intact in all 4 ext. Coord: FTN and HTS intact b/l  DTR: deferred  Gait: narrow base gait    NIH Stroke Scale:   1a  Level of consciousness: 0 - alert; keenly responsive   1b. LOC questions:  0 - answers both questions correctly   1c. LOC commands: 0 - performs both tasks correctly   2. Best Gaze: 0 - normal   3. Visual: 1 - partial hemianopia   4.  Facial Palsy: 0 - normal symmetric movement   5a. Motor left arm: 0 - no drift, limb holds 90 (or 45) degrees for full 10 seconds   5b. Motor right arm: 0 - no drift, limb holds 90 (or 45) degrees for full 10 seconds   6a. Motor left le - no drift; leg holds 30 degree position for full 5 seconds   6b  Motor right le - no drift; leg holds 30 degree position for full 5 seconds   7. Limb Ataxia: 0 - absent   8. Sensory: 0 - normal; no sensory loss   9. Best Language:  0 - no aphasia, normal   10. Dysarthria: 0 - normal   11. Extinction and Inattention: 0 - no abnormality         Total:   1     MRS: 1      LABS:   Reviewed. Lab Results   Component Value Date    HGB 10.3 (L) 2022    WBC 5.8 2022     (H) 2022     2022    BUN 12 2022    CREATININE 0.50 2022    AST 26 2022    ALT 13 2022    MG 2.4 2022      Lab Results   Component Value Date    COVID19 Not Detected 2022    COVID19 Not Detected 11/10/2020       RADIOLOGY:   Images were personally reviewed including:      MRA Head w/o contrast 2022  Unchanged 2 mm superior projecting aneurysm at the junction of left  DIPTI A1 segment and anterior communicating artery. No hemodynamically significant stenosis.     MRI brain w/o contrast 2022  Unremarkable brain MRI. [place] Pott is no acute infarction, intracranial  hemorrhage, or intracranial mass lesion.     CT/A head and neck w con 1/3/2022  1. No acute intracranial abnormality. 2. 2 mm aneurysm at the junction of the left anterior cerebral and anterior  communicating arteries. 3. No evidence of large vessel occlusion or hemodynamically significant  stenosis involving the head and neck arteries. 4. Scattered areas of patchy and ground-glass opacities are noted at the  bilateral pulmonary apices. These are nonspecific but could indicate an  atypical/viral pneumonia, inclusive of COVID-19.       ASSESSMENT:     41yo female with pmh of hypothyroid, migraine, COVID19+ 12/24/2021.  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 reasonable to check dx dsa given persistent symptoms. MRI/MRA completed showing 2 mm aneurysm location at junction of left DIPTI and ACOMM. 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 Loaded asa 325 rectal and Plavix 300. Recommendations:      --sbp 110-140  -- Continue ASA 81 and Plavix 75 mg daily  --Possible ACOM aneurysm embolization by FD on 1/12/2022. Plan to discuss case in multidisciplinary meeting.  --check cta pelvis with femoral runoff w and wo con 1/11/2022 pm  --f/u Dr Ko Dai 2w after discharge, Dr Ashwin Russell 3 mo after discharge. Case was discussed with Dr. Ashwin Russell.      Anisa Harrison MD, Fellow  Stroke, [facility]  [address]

## 2022-01-12 VITALS
BODY MASS INDEX: 25.53 KG/M2 | SYSTOLIC BLOOD PRESSURE: 90 MMHG | HEIGHT: 65 IN | DIASTOLIC BLOOD PRESSURE: 55 MMHG | RESPIRATION RATE: 11 BRPM | TEMPERATURE: 98 F | HEART RATE: 62 BPM | OXYGEN SATURATION: 77 % | WEIGHT: 153.22 LBS

## 2022-01-12 PROBLEM — I77.72 ILIAC ARTERY DISSECTION (HCC): Status: ACTIVE | Noted: 2022-01-12

## 2022-01-12 PROBLEM — I67.1 CEREBRAL ANEURYSM: Status: ACTIVE | Noted: 2022-01-12

## 2022-01-12 LAB
ABSOLUTE EOS #: 0.1 K/UL (ref 0–0.44)
ABSOLUTE IMMATURE GRANULOCYTE: 0.07 K/UL (ref 0–0.3)
ABSOLUTE LYMPH #: 2.33 K/UL (ref 1.1–3.7)
ABSOLUTE MONO #: 0.38 K/UL (ref 0.1–1.2)
ANION GAP SERPL CALCULATED.3IONS-SCNC: 10 MMOL/L (ref 9–17)
BASOPHILS # BLD: 1 % (ref 0–2)
BASOPHILS ABSOLUTE: 0.05 K/UL (ref 0–0.2)
BUN BLDV-MCNC: 14 MG/DL (ref 6–20)
BUN/CREAT BLD: NORMAL (ref 9–20)
CALCIUM SERPL-MCNC: 8.8 MG/DL (ref 8.6–10.4)
CHLORIDE BLD-SCNC: 106 MMOL/L (ref 98–107)
CO2: 22 MMOL/L (ref 20–31)
CREAT SERPL-MCNC: 0.63 MG/DL (ref 0.5–0.9)
DIFFERENTIAL TYPE: ABNORMAL
EOSINOPHILS RELATIVE PERCENT: 2 % (ref 1–4)
GFR AFRICAN AMERICAN: >60 ML/MIN
GFR NON-AFRICAN AMERICAN: >60 ML/MIN
GFR SERPL CREATININE-BSD FRML MDRD: NORMAL ML/MIN/{1.73_M2}
GFR SERPL CREATININE-BSD FRML MDRD: NORMAL ML/MIN/{1.73_M2}
GLUCOSE BLD-MCNC: 94 MG/DL (ref 70–99)
HCT VFR BLD CALC: 34.2 % (ref 36.3–47.1)
HEMOGLOBIN: 10.3 G/DL (ref 11.9–15.1)
IMMATURE GRANULOCYTES: 1 %
LYMPHOCYTES # BLD: 39 % (ref 24–43)
MCH RBC QN AUTO: 25.5 PG (ref 25.2–33.5)
MCHC RBC AUTO-ENTMCNC: 30.1 G/DL (ref 28.4–34.8)
MCV RBC AUTO: 84.7 FL (ref 82.6–102.9)
MONOCYTES # BLD: 6 % (ref 3–12)
NRBC AUTOMATED: 0 PER 100 WBC
PDW BLD-RTO: 15.8 % (ref 11.8–14.4)
PLATELET # BLD: 612 K/UL (ref 138–453)
PLATELET ESTIMATE: ABNORMAL
PMV BLD AUTO: 8.5 FL (ref 8.1–13.5)
POTASSIUM SERPL-SCNC: 4.1 MMOL/L (ref 3.7–5.3)
RBC # BLD: 4.04 M/UL (ref 3.95–5.11)
RBC # BLD: ABNORMAL 10*6/UL
SEG NEUTROPHILS: 51 % (ref 36–65)
SEGMENTED NEUTROPHILS ABSOLUTE COUNT: 3 K/UL (ref 1.5–8.1)
SODIUM BLD-SCNC: 138 MMOL/L (ref 135–144)
WBC # BLD: 5.9 K/UL (ref 3.5–11.3)
WBC # BLD: ABNORMAL 10*3/UL

## 2022-01-12 PROCEDURE — APPSS45 APP SPLIT SHARED TIME 31-45 MINUTES: Performed by: NURSE PRACTITIONER

## 2022-01-12 PROCEDURE — 36415 COLL VENOUS BLD VENIPUNCTURE: CPT

## 2022-01-12 PROCEDURE — 6360000002 HC RX W HCPCS: Performed by: NURSE PRACTITIONER

## 2022-01-12 PROCEDURE — 99239 HOSP IP/OBS DSCHRG MGMT >30: CPT | Performed by: NURSE PRACTITIONER

## 2022-01-12 PROCEDURE — 85025 COMPLETE CBC W/AUTO DIFF WBC: CPT

## 2022-01-12 PROCEDURE — 99233 SBSQ HOSP IP/OBS HIGH 50: CPT | Performed by: PSYCHIATRY & NEUROLOGY

## 2022-01-12 PROCEDURE — 6360000002 HC RX W HCPCS: Performed by: STUDENT IN AN ORGANIZED HEALTH CARE EDUCATION/TRAINING PROGRAM

## 2022-01-12 PROCEDURE — 99238 HOSP IP/OBS DSCHRG MGMT 30/<: CPT | Performed by: PSYCHIATRY & NEUROLOGY

## 2022-01-12 PROCEDURE — 2580000003 HC RX 258: Performed by: STUDENT IN AN ORGANIZED HEALTH CARE EDUCATION/TRAINING PROGRAM

## 2022-01-12 PROCEDURE — 6370000000 HC RX 637 (ALT 250 FOR IP): Performed by: STUDENT IN AN ORGANIZED HEALTH CARE EDUCATION/TRAINING PROGRAM

## 2022-01-12 PROCEDURE — 80048 BASIC METABOLIC PNL TOTAL CA: CPT

## 2022-01-12 RX ORDER — ASPIRIN 81 MG/1
81 TABLET, CHEWABLE ORAL DAILY
Qty: 30 TABLET | Refills: 3 | Status: SHIPPED | OUTPATIENT
Start: 2022-01-13 | End: 2022-05-26 | Stop reason: SDUPTHER

## 2022-01-12 RX ORDER — PANTOPRAZOLE SODIUM 20 MG/1
40 TABLET, DELAYED RELEASE ORAL DAILY
Qty: 30 TABLET | Refills: 0 | Status: SHIPPED | OUTPATIENT
Start: 2022-01-12

## 2022-01-12 RX ORDER — CLOPIDOGREL BISULFATE 75 MG/1
75 TABLET ORAL DAILY
Qty: 30 TABLET | Refills: 3 | Status: SHIPPED | OUTPATIENT
Start: 2022-01-13 | End: 2022-06-20 | Stop reason: ALTCHOICE

## 2022-01-12 RX ORDER — ASPIRIN 81 MG/1
81 TABLET, CHEWABLE ORAL DAILY
Qty: 30 TABLET | Refills: 3 | Status: SHIPPED | OUTPATIENT
Start: 2022-01-13 | End: 2022-01-12

## 2022-01-12 RX ORDER — CLOPIDOGREL BISULFATE 75 MG/1
75 TABLET ORAL DAILY
Qty: 30 TABLET | Refills: 3 | Status: SHIPPED | OUTPATIENT
Start: 2022-01-13 | End: 2022-01-12

## 2022-01-12 RX ORDER — BENZONATATE 100 MG/1
100 CAPSULE ORAL EVERY 8 HOURS PRN
Qty: 21 CAPSULE | Refills: 0 | Status: SHIPPED | OUTPATIENT
Start: 2022-01-12 | End: 2022-01-19

## 2022-01-12 RX ORDER — ONDANSETRON 4 MG/1
4 TABLET, ORALLY DISINTEGRATING ORAL EVERY 8 HOURS PRN
Qty: 21 TABLET | Refills: 0 | Status: SHIPPED | OUTPATIENT
Start: 2022-01-12 | End: 2022-01-19

## 2022-01-12 RX ADMIN — SODIUM CHLORIDE, PRESERVATIVE FREE 10 ML: 5 INJECTION INTRAVENOUS at 09:30

## 2022-01-12 RX ADMIN — LEVOTHYROXINE SODIUM 125 MCG: 100 TABLET ORAL at 08:01

## 2022-01-12 RX ADMIN — PANTOPRAZOLE SODIUM 40 MG: 40 TABLET, DELAYED RELEASE ORAL at 09:20

## 2022-01-12 RX ADMIN — DIPHENHYDRAMINE HYDROCHLORIDE 25 MG: 50 INJECTION, SOLUTION INTRAMUSCULAR; INTRAVENOUS at 07:56

## 2022-01-12 RX ADMIN — ASPIRIN 81 MG: 81 TABLET, CHEWABLE ORAL at 09:20

## 2022-01-12 RX ADMIN — CLOPIDOGREL 75 MG: 75 TABLET, FILM COATED ORAL at 09:19

## 2022-01-12 RX ADMIN — ENOXAPARIN SODIUM 40 MG: 100 INJECTION SUBCUTANEOUS at 09:20

## 2022-01-12 ASSESSMENT — PAIN DESCRIPTION - PROGRESSION

## 2022-01-12 NOTE — PROGRESS NOTES
Respiratory  Negative for hemoptysis and sputum    Cardiovascular  Negative for orthopnea, claudication and PND    Gastrointestinal  Negative for abdominal pain, diarrhea, blood in stool    Musculoskeletal  Negative for joint pain, negative for myalgia    Skin  Negative for rash or itching    Endo/heme/allergies  Negative for polydipsia, environmental allergy    Psychiatric/behavioral  Negative for suicidal ideation.  Patient is not anxious        NEUROLOGIC EXAMINATION  GENERAL  Appears comfortable and in no distress   HEENT  NC/ AT   NECK  Supple   MENTAL STATUS:  Alert, oriented, intact memory, no confusion, normal speech, normal language, no hallucination or delusion   CRANIAL NERVES: II     -      Visual fields intact to confrontation  III,IV,VI -  EOMs full, no afferent defect, no LILLIE, no ptosis  V     -     Normal facial sensation  VII    -     Normal facial symmetry  VIII   -     Intact hearing  IX,X -     Symmetrical palate  XI    -     Symmetrical shoulder shrug  XII   -     Midline tongue, no atrophy    MOTOR FUNCTION:  Moves all limbs easily with normal bulk, normal tone and no involuntary movements, no tremor   SENSORY FUNCTION:  Normal touch, normal pin   CEREBELLAR FUNCTION:  Intact fine motor control over upper limbs   REFLEX FUNCTION:  Symmetric, no perverted reflex, no Babinski sign   STATION and GAIT  Steady gait       Data:    Lab Results:   CBC:   Recent Labs     01/10/22  1903 01/11/22  0728 01/12/22  0646   WBC 5.5 5.8 5.9   HGB 10.5* 10.3* 10.3*   * 637* 612*     BMP:    Recent Labs     01/10/22  1903 01/11/22  0728 01/12/22  0646   * 136 138   K 4.1 4.2 4.1    103 106   CO2 18* 20 22   BUN 12 12 14   CREATININE 0.56 0.50 0.63   GLUCOSE 105* 96 94         Lab Results   Component Value Date    CHOL 163 11/01/2013    LDLCHOLESTEROL 65 07/30/2019    HDL 58 07/30/2019    TRIG 49 11/01/2013    ALT 13 01/02/2022    AST 26 01/02/2022    TSH 4.48 01/02/2022           Diagnostic data reviewed:  CT HEAD/CTA HEAD AND NECK (1/3/22) -  1. No acute intracranial abnormality. 2. 2 mm aneurysm at the junction of the left anterior cerebral and anterior   communicating arteries. 3. No evidence of large vessel occlusion or hemodynamically significant   stenosis involving the head and neck arteries. 4. Scattered areas of patchy and ground-glass opacities are noted at the   bilateral pulmonary apices. These are nonspecific but could indicate an   atypical/viral pneumonia, inclusive of COVID-19. MRI BRAIN/MRA HEAD (1/7/22) -  MRA head: Unchanged 2 mm superior projecting aneurysm at the junction of left   DIPTI A1 segment and anterior communicating artery.       No hemodynamically significant stenosis.       Brain MRI:       Unremarkable brain MRI. Becki Cordova is no acute infarction, intracranial   hemorrhage, or intracranial mass lesion. CEREBRAL ANGIOGRAM (1/9/22) -  Please see dictated Radiology note for further details  --right and anterior facing blister L A2 DIPTI origin aneurysm, dimensions height 1.22mm, length 1.48mm, width 3.05mm, neck 3.05mm  --dissection flap in the R external iliac artery, there is good anterograde flow into the distal branches. 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       Right inguinal region soft tissue inflammation at the vascular access site. No hematoma within the retroperitoneum or right groin.       Extensive airspace disease at the visualized lung bases which is nonspecific   although suspicious for multilobar pneumonia, inclusive of active COVID-19   viral pneumonia in the appropriate setting.                    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 Plan to repeat CTA abd/pelvis in one month and repeat MRA head in 3 months with further follow up for possible aneurysm intervention to be done in the outpatient endovascular office  -Topamax discontinued due to palpitations. Patient requests nothing further for headache as this time  -Discussed activity restrictions at length at the bedside; patient instructed to avoid strenuous activity, and continue with only light duty and no heavy lifting until seen/cleared by the outpatient neurology office  -DVT prophylaxis; Lovenox    Please note that this note was generated using a voice recognition dictation software. Although every effort was made to ensure the accuracy of this automated transcription, some errors in transcription may have occurred.

## 2022-01-12 NOTE — DISCHARGE INSTR - ACTIVITY
Avoid strenuous exercise and continue with light duty only with no heavy lifting until cleared by the outpatient neurology office.

## 2022-01-12 NOTE — DISCHARGE SUMMARY
[id number] [facility] Neurology  [facility]    Discharge Summary     Patient ID: Kristina Meng  :  1975   MRN: [id number]     ACCOUNT:  [de-identified]   Patient's PCP: May Jameson MD  Admit Date: 2022   Discharge Date: 2022     Length of Stay: 5  Code Status:  Full Code  Admitting Physician: Isabela Bah MD  Discharge Physician: Montserrat Cummings, APRN - CNP     Active Discharge Diagnoses:     Hospital Problem Lists:  Active Problems:    Migraine syndrome    Acute nonintractable headache    Cerebral aneurysm    Iliac artery dissection [facility])  Resolved Problems:    * No resolved hospital problems. *      Admission Condition:  fair     Discharged Condition: stable    Hospital Stay:     Hospital Course:  Kristina Meng is a 41 y.o. female who was admitted for the management of headache. She describes her migraines as frontal, throbbing, and daily for the last several months. 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 She was treated with migraine cocktail and wished to go home but presented to the emergency room again on  for a change in the headache with more intense pain focused around the cranial apex. Upon further discussion with the patient it was found that she has had headaches most of her life and given her daily headaches at home for the last several months she was started on prophylactic Topamax 25 mg nightly. Unfortunately she developed heart palpitations so Topamax was stopped and patient did not desire further pharmaceutical treatment at this time. MRI brain was unremarkable. MRA shows a unchanged 2 mm superior projecting aneurysm at the junction of the left DIPTI A1 segment and anterior communicating artery. Endovascular was consulted and took the patient for an angiogram on .   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 The case was discussed with endovascular team who felt the dissection looked improved as compared to initial and recommend repeat CTA abdomen/pelvis in one month as well as repeat MRA head in 3 months and further discussion of possible aneurysm intervention to be done in the outpatient setting. No intervention planned for this admission from endovascular standpoint. Patient's headache did resolve, she has no new acute issues however her left eye J shaped negative scotoma has remained the same during her admission. She is alert and oriented and all questions answered at the bedside at length. She was instructed to avoid strenuous exercise and continue on light duty only at work with no heavy lifting until further guidance from the outpatient neurology office.       Significant therapeutic interventions: s/p cerebral angiogram    Significant Diagnostic Studies:   Labs / Micro:  CBC:   Lab Results   Component Value Date    WBC 5.9 01/12/2022    RBC 4.04 01/12/2022    HGB 10.3 01/12/2022    HCT 34.2 01/12/2022    MCV 84.7 01/12/2022    MCH 25.5 01/12/2022    MCHC 30.1 01/12/2022    RDW 15.8 01/12/2022     01/12/2022     BMP:    Lab Results   Component Value Date    GLUCOSE 94 01/12/2022     01/12/2022    K 4.1 01/12/2022     01/12/2022    CO2 22 01/12/2022    ANIONGAP 10 01/12/2022    BUN 14 01/12/2022    CREATININE 0.63 01/12/2022    BUNCRER NOT REPORTED 01/12/2022    CALCIUM 8.8 01/12/2022    LABGLOM >60 01/12/2022    GFRAA >60 01/12/2022    GFR      01/12/2022    GFR NOT REPORTED 01/12/2022       Radiology:    CT Head WO Contrast    Result Date: 1/3/2022  EXAMINATION: CTA OF THE HEAD AND NECK WITH CONTRAST; CT OF THE HEAD WITHOUT CONTRAST 1/3/2022 9:00 pm: TECHNIQUE: CTA of the head and neck was performed with the administration of intravenous contrast. Multiplanar reformatted images are provided for review. MIP images are provided for review. Stenosis of the internal carotid arteries measured using NASCET criteria. Dose modulation, iterative reconstruction, and/or weight based adjustment of the mA/kV was utilized to reduce the radiation dose to as low as reasonably achievable.; CT of the head was performed without the administration of intravenous contrast. Dose modulation, iterative reconstruction, and/or weight based adjustment of the mA/kV was utilized to reduce the radiation dose to as low as reasonably achievable. Noncontrast CT of the head with reconstructed 2-D images are also provided for review. COMPARISON: None. HISTORY: ORDERING SYSTEM PROVIDED HISTORY: dizziness TECHNOLOGIST PROVIDED HISTORY: dizziness Decision Support Exception - unselect if not a suspected or confirmed emergency medical condition->Emergency Medical Condition (MA); ORDERING SYSTEM PROVIDED HISTORY: dizziness TECHNOLOGIST PROVIDED HISTORY: dizziness Decision Support Exception - unselect if not a suspected or confirmed emergency medical condition->Emergency Medical Condition (MA) Is the patient pregnant?->No FINDINGS: CT HEAD: BRAIN/VENTRICLES:  No acute intracranial hemorrhage or extraaxial fluid collection. Grey-white differentiation is maintained. No evidence of mass, mass effect or midline shift. No evidence of hydrocephalus. ORBITS: The visualized portion of the orbits demonstrate no acute abnormality. SINUSES:  The visualized paranasal sinuses and mastoid air cells demonstrate no acute abnormality. SOFT TISSUES/SKULL: No acute abnormality of the visualized skull or soft tissues. CTA NECK: AORTIC ARCH/ARCH VESSELS: No dissection or arterial injury.   No significant stenosis of the brachiocephalic or subclavian arteries. CAROTID ARTERIES: No dissection, arterial injury, or hemodynamically significant stenosis by NASCET criteria. VERTEBRAL ARTERIES: No dissection, arterial injury, or significant stenosis. SOFT TISSUES: Scattered areas of patchy and ground-glass opacities are noted at the bilateral pulmonary apices. These are nonspecific but could indicate an atypical/viral pneumonia, inclusive of COVID-19. No cervical or superior mediastinal lymphadenopathy. The larynx and pharynx are unremarkable. No acute abnormality of the salivary and thyroid glands. BONES: No acute osseous abnormality. 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 No significant stenosis of the intracranial internal carotid, anterior cerebral, or middle cerebral arteries. No aneurysm. POSTERIOR CIRCULATION: No significant stenosis of the vertebral, basilar, or posterior cerebral arteries. No aneurysm. OTHER: No dural venous sinus thrombosis on this non-dedicated study. 1. No acute intracranial abnormality. 2. 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 3. No evidence of large vessel occlusion or hemodynamically significant stenosis involving the head and neck arteries. 4. Scattered areas of patchy and ground-glass opacities are noted at the bilateral pulmonary apices. These are nonspecific but could indicate an atypical/viral pneumonia, inclusive of COVID-19. MRA HEAD WO CONTRAST    Result Date: 1/7/2022  EXAMINATION: MRA OF THE HEAD WITHOUT CONTRAST; MRI OF THE BRAIN WITHOUT CONTRAST 1/7/2022 5:42 pm TECHNIQUE: MRA of the head was performed utilizing time-of-flight imaging with MIP images.  No intravenous contrast was administered.; Multiplanar multisequence MRI of the brain was performed without the administration of intravenous contrast. COMPARISON: None HISTORY: ORDERING SYSTEM PROVIDED HISTORY: worsening headaches, hx of cerebral aneurysm TECHNOLOGIST PROVIDED HISTORY: worsening headaches, hx of cerebral aneurysm Is the patient pregnant?->No Reason for Exam: worsening headaches, history of cerebral aneurysm FINDINGS: MRA head: ANTERIOR CIRCULATION: Unchanged 2 mm superiorly projecting outpouching at the junction of left DIPTI A1 segment and anterior communicating artery likely representing a small aneurysm. No significant stenosis of the intracranial internal carotid, anterior cerebral, or middle cerebral arteries. POSTERIOR CIRCULATION: No significant stenosis of the vertebral, basilar, or posterior cerebral arteries. MRI brain: There is no acute infarction, intracranial hemorrhage, or intracranial mass lesion. No mass effect, midline shift, or extra-axial collection is noted. The brain parenchyma is  normal. The pituitary gland is normal in appearance. The cerebellar tonsils are in normal position. The ventricles, sulci, and cisterns are within normal size and range. No significant volume loss. .  The intracranial flow voids are preserved. The globes and orbits are within normal limits. The visualized extracranial structures including paranasal sinuses and mastoid air cells are unremarkable. MRA head: Unchanged 2 mm superior projecting aneurysm at the junction of left DIPTI A1 segment and anterior communicating artery. No hemodynamically significant stenosis. Brain MRI: Unremarkable brain MRI. There is no acute infarction, intracranial hemorrhage, or intracranial mass lesion. RECOMMENDATIONS: Unavailable     XR CHEST PORTABLE    Result Date: 1/2/2022  EXAMINATION: ONE XRAY VIEW OF THE CHEST 1/2/2022 1:12 am COMPARISON: 01/02/2021. HISTORY: ORDERING SYSTEM PROVIDED HISTORY: sob cough TECHNOLOGIST PROVIDED HISTORY: sob cough Reason for Exam: Upright port, Cough, SOB FINDINGS: Cardiomediastinal silhouette appears within normal limits.   Scattered bilateral patchy airspace opacities, most notable in the left mid and right lower lung zones. Costophrenic angles are sharp. No evidence of pneumothorax. No acute osseous abnormalities. Scattered bilateral patchy airspace opacities, most notable in the left mid and right lower lung zones. Finding concerning for multifocal pneumonia, possibly COVID-19 pneumonia in the appropriate clinical setting. CTA ABDOMEN PELVIS W CONTRAST    Result Date: 1/11/2022  EXAMINATION: CTA OF THE ABDOMEN AND PELVIS WITH CONTRAST 1/11/2022 4:27 pm: TECHNIQUE: CTA of the abdomen and pelvis was performed with the administration of intravenous contrast. Multiplanar reformatted images are provided for review. MIP images are provided for review. Dose modulation, iterative reconstruction, and/or weight based adjustment of the mA/kV was utilized to reduce the radiation dose to as low as reasonably achievable. COMPARISON: None. HISTORY: ORDERING SYSTEM PROVIDED HISTORY: Evaluation of right external iliac dissection TECHNOLOGIST PROVIDED HISTORY: Include groin Evaluation of right external iliac dissection Reason for Exam: Evaluation of right external iliac dissection FINDINGS: Lower chest: There are patchy areas of moderate-sized ground-glass opacity at the visualized lung bases. The appearance is of uncertain chronicity although suspicious for multilobar pneumonia. Heart size is normal.  No pleural effusion. Vascular: The abdominal aorta and the bilateral common iliac, internal iliac, and left external iliac arteries are patent and of normal caliber, without evidence of aneurysm, plaque formation, stenosis, or acute abnormality. The bilateral common femoral and visualized proximal superficial femoral and deep femoral arteries are patent and normal in caliber, also free of plaque formation, stenosis, or vessel irregularity. A mild degree of inflammation of the soft tissue surrounds the right femoral arteries, without associated pseudoaneurysm or hematoma. A dissection of the mid right external iliac artery is present which results in severe luminal narrowing, spanning a length of approximately 4-5 cm, likely acute or subacute although contrast passes beyond this into a normal-appearing external iliac artery beyond the area of dissection. The lumen is narrowed to a diameter of 2.2 mm and the normal caliber vessel proximal to the dissection measures approximately 8-mm. The celiac, superior mesenteric, inferior mesenteric, and renal arteries are all patent and of normal caliber, without significant stenosis or vessel irregularity. Organs: The liver, spleen, pancreas, kidneys, gallbladder, and adrenal glands appear normal. GI/Bowel: The stomach and the small and large bowel loops are normal in caliber, contour, and morphology, without acute or significant abnormality. No dilated loops or areas of bowel wall thickening. The appendix is normal. Peritoneum/Retroperitoneum: There is no free fluid or extraluminal gas. No mesenteric, retroperitoneal, or body wall hematoma. No enlarged or suspicious mesenteric or retroperitoneal lymphadenopathy. Pelvis: No pelvic free fluid or enlarged or suspicious pelvic or inguinal lymphadenopathy. No appreciable uterine or adnexal abnormality. The urinary bladder and the pelvic bowel loops are unremarkable. Bones/Soft Tissues: Degenerative changes are present within the lower lumbar spine at L5-S1. No abdominal wall or inguinal hernia. Moderate length dissection of the right external iliac artery which results in severe vessel luminal narrowing, although may not be flow limiting. Right inguinal region soft tissue inflammation at the vascular access site. No hematoma within the retroperitoneum or right groin. Extensive airspace disease at the visualized lung bases which is nonspecific although suspicious for multilobar pneumonia, inclusive of active COVID-19 viral pneumonia in the appropriate setting.  RECOMMENDATIONS: Unavailable CTA HEAD NECK W CONTRAST    Result Date: 1/3/2022  EXAMINATION: CTA OF THE HEAD AND NECK WITH CONTRAST; CT OF THE HEAD WITHOUT CONTRAST 1/3/2022 9:00 pm: TECHNIQUE: CTA of the head and neck was performed with the administration of intravenous contrast. Multiplanar reformatted images are provided for review. MIP images are provided for review. Stenosis of the internal carotid arteries measured using NASCET criteria. Dose modulation, iterative reconstruction, and/or weight based adjustment of the mA/kV was utilized to reduce the radiation dose to as low as reasonably achievable.; CT of the head was performed without the administration of intravenous contrast. Dose modulation, iterative reconstruction, and/or weight based adjustment of the mA/kV was utilized to reduce the radiation dose to as low as reasonably achievable. Noncontrast CT of the head with reconstructed 2-D images are also provided for review. COMPARISON: None. HISTORY: ORDERING SYSTEM PROVIDED HISTORY: dizziness TECHNOLOGIST PROVIDED HISTORY: dizziness Decision Support Exception - unselect if not a suspected or confirmed emergency medical condition->Emergency Medical Condition (MA); ORDERING SYSTEM PROVIDED HISTORY: dizziness TECHNOLOGIST PROVIDED HISTORY: dizziness Decision Support Exception - unselect if not a suspected or confirmed emergency medical condition->Emergency Medical Condition (MA) Is the patient pregnant?->No FINDINGS: CT HEAD: BRAIN/VENTRICLES:  No acute intracranial hemorrhage or extraaxial fluid collection. Grey-white differentiation is maintained. No evidence of mass, mass effect or midline shift. No evidence of hydrocephalus. ORBITS: The visualized portion of the orbits demonstrate no acute abnormality. SINUSES:  The visualized paranasal sinuses and mastoid air cells demonstrate no acute abnormality. SOFT TISSUES/SKULL: No acute abnormality of the visualized skull or soft tissues.  CTA NECK: AORTIC ARCH/ARCH VESSELS: No dissection or arterial injury. No significant stenosis of the brachiocephalic or subclavian arteries. CAROTID ARTERIES: No dissection, arterial injury, or hemodynamically significant stenosis by NASCET criteria. VERTEBRAL ARTERIES: No dissection, arterial injury, or significant stenosis. SOFT TISSUES: Scattered areas of patchy and ground-glass opacities are noted at the bilateral pulmonary apices. These are nonspecific but could indicate an atypical/viral pneumonia, inclusive of COVID-19. No cervical or superior mediastinal lymphadenopathy. The larynx and pharynx are unremarkable. No acute abnormality of the salivary and thyroid glands. BONES: No acute osseous abnormality. 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 No significant stenosis of the intracranial internal carotid, anterior cerebral, or middle cerebral arteries. No aneurysm. POSTERIOR CIRCULATION: No significant stenosis of the vertebral, basilar, or posterior cerebral arteries. No aneurysm. OTHER: No dural venous sinus thrombosis on this non-dedicated study. 1. No acute intracranial abnormality. 2. 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 3. No evidence of large vessel occlusion or hemodynamically significant stenosis involving the head and neck arteries. 4. Scattered areas of patchy and ground-glass opacities are noted at the bilateral pulmonary apices. These are nonspecific but could indicate an atypical/viral pneumonia, inclusive of COVID-19. MRI BRAIN WO CONTRAST    Result Date: 1/7/2022  EXAMINATION: MRA OF THE HEAD WITHOUT CONTRAST; MRI OF THE BRAIN WITHOUT CONTRAST 1/7/2022 5:42 pm TECHNIQUE: MRA of the head was performed utilizing time-of-flight imaging with MIP images.  No intravenous contrast was administered.; Multiplanar multisequence MRI of the brain was performed without the administration of intravenous contrast. COMPARISON: None HISTORY: ORDERING SYSTEM PROVIDED HISTORY: worsening headaches, hx of cerebral aneurysm TECHNOLOGIST PROVIDED HISTORY: worsening headaches, hx of cerebral aneurysm Is the patient pregnant?->No Reason for Exam: worsening headaches, history of cerebral aneurysm FINDINGS: MRA head: ANTERIOR CIRCULATION: Unchanged 2 mm superiorly projecting outpouching at the junction of left DIPTI A1 segment and anterior communicating artery likely representing a small aneurysm. No significant stenosis of the intracranial internal carotid, anterior cerebral, or middle cerebral arteries. POSTERIOR CIRCULATION: No significant stenosis of the vertebral, basilar, or posterior cerebral arteries. MRI brain: There is no acute infarction, intracranial hemorrhage, or intracranial mass lesion. No mass effect, midline shift, or extra-axial collection is noted. The brain parenchyma is  normal. The pituitary gland is normal in appearance. The cerebellar tonsils are in normal position. The ventricles, sulci, and cisterns are within normal size and range. No significant volume loss. .  The intracranial flow voids are preserved. The globes and orbits are within normal limits. The visualized extracranial structures including paranasal sinuses and mastoid air cells are unremarkable. MRA head: Unchanged 2 mm superior projecting aneurysm at the junction of left DIPTI A1 segment and anterior communicating artery. No hemodynamically significant stenosis. Brain MRI: Unremarkable brain MRI. There is no acute infarction, intracranial hemorrhage, or intracranial mass lesion. RECOMMENDATIONS: Unavailable         Consultations:    Consults:     Final Specialist Recommendations/Findings:   IP CONSULT TO ENDOVASCULAR NEUROSURGERY      The patient was seen and examined on day of discharge and this discharge summary is in conjunction with any daily progress note from day of discharge.     Discharge plan:     Disposition: Home    Physician Follow Up:     Jose Manuel Garcia MD  [address] [facility]  [phone]    In 1 month  With CTA abdomen/pelvis to be completed before appointment    Brandi Singh MD  [address], [address]  MOB # Jose A Cummings [address] [facility]  [phone]    In 3 months  With MRA head to be completed before appointment    Connie Green MD  [facility] [address]  [address]B [id number] [facility]  [phone]    In 1 month      Genesis Flores, [address] [facility] [address]  [phone]    Schedule an appointment as soon as possible for a visit         Requiring Further Evaluation/Follow Up POST HOSPITALIZATION/Incidental Findings: CTA abdomen/pelvis in 1 month  MRA head in 3 months    Diet: regular diet    Activity: As tolerated; No heavy lifting or strenuous exercise. Instructions to Patient: Return to the ER for any new or worsening headache or associated symptoms. Discharge Medications:      Medication List      START taking these medications    aspirin 81 MG chewable tablet  Take 1 tablet by mouth daily  Start taking on: January 13, 2022     clopidogrel 75 MG tablet  Commonly known as: PLAVIX  Take 1 tablet by mouth daily  Start taking on: January 13, 2022        CHANGE how you take these medications    levothyroxine 200 MCG tablet  Commonly known as: SYNTHROID  Take 0.5 tablets by mouth Daily.   What changed: how much to take        CONTINUE taking these medications    benzonatate 100 MG capsule  Commonly known as: TESSALON  Take 1 capsule by mouth every 8 hours as needed for Cough     guaiFENesin-dextromethorphan 100-10 MG/5ML syrup  Commonly known as: ROBITUSSIN DM  Take 5 mLs by mouth 3 times daily as needed for Cough     ondansetron 4 MG disintegrating tablet  Commonly known as: Zofran ODT  Take 1 tablet by mouth every 8 hours as needed for Nausea or Vomiting     pantoprazole 20 MG tablet  Commonly known as: Protonix  Take 2 tablets by mouth daily     therapeutic multivitamin-minerals tablet           Where to Get Your Medications      These medications were sent to [facility] [address]  [facility], [address] [place] [address],[address]    Phone: [phone]   · aspirin 81 MG chewable tablet  · clopidogrel 75 MG tablet     You can get these medications from any pharmacy    Bring a paper prescription for each of these medications  · benzonatate 100 MG capsule  · ondansetron 4 MG disintegrating tablet  · pantoprazole 20 MG tablet         Time Spent on discharge is  45 mins in patient examination, evaluation, counseling as well as medication reconciliation, prescriptions for required medications, discharge plan and follow up. Electronically signed by   MARQUES Parekh CNP  1/12/2022  3:31 PM      Thank you Dr. Washington Daily MD for the opportunity to be involved in this patient's care.

## 2022-01-12 NOTE — CARE COORDINATION
Discharge 1 [facility] [place] [place] Case Management Department  Written by: Jaime Garduno RN    Patient Name: Yulisa Villela  Attending Provider: Yao Ortega MD  Admit Date: 2022  2:43 PM  MRN: [id number]  Account: [de-identified]                     : 1975  Discharge Date: 22      Disposition: home with significant other, has ride, independent per RN    Jaime Garduno RN

## 2022-01-12 NOTE — PROGRESS NOTES
Endovascular Neurosurgery Progress Note    SUBJECTIVE:     Patient seen and examined at bedside. Patient stated she was have anxiety attacks during the night she thought may be due to Topimax. Review of Systems:  CONSTITUTIONAL:  negative for fevers, chills, fatigue and malaise    EYES:  negative for double vision, blurred vision and photophobia     HEENT:  negative for tinnitus, epistaxis and sore throat    RESPIRATORY:  negative for cough, shortness of breath, wheezing    CARDIOVASCULAR:  negative for chest pain, palpitations, syncope, edema    GASTROINTESTINAL:  negative for nausea, vomiting    GENITOURINARY:  negative for incontinence    MUSCULOSKELETAL:  negative for neck or back pain    NEUROLOGICAL:  Negative for weakness and tingling  negative for headaches and dizziness    PSYCHIATRIC:  negative for anxiety      Review of systems otherwise negative. OBJECTIVE:     Vitals:    01/12/22 0349   BP: (!) 98/54   Pulse: 74   Resp: 18   Temp: 98.1 °F (36.7 °C)   SpO2: 90%        Gen: normal habitus, NAD  HEENT: NCAT, mucosa moist  Cvs: RRR, S1 S2 normal,   Resp: symmetric unlabored breathing  Abd: s/nd/nt  Ext: no edema  Skin: Puncture site from angiogram is dry and covered. No hematoma present. Pulses 2+ RLE      Neuro:  Gen: awake and alert, oriented x3. Lang/speech: no aphasia or dysarthria. Follows commands. CN: PERRL, EOMI, VFF to finger count. L eye central J shaped grey scotoma. V1-3 intact, face symmetric, hearing intact, shoulder shrug symmetric, tongue midline  Motor: grossly 5/5 UE and LE b/l  Sense: LT intact in all 4 ext. Coord: FTN and HTS intact b/l  DTR: deferred  Gait: narrow base gait      NIH Stroke Scale:   1a  Level of consciousness: 0 - alert; keenly responsive   1b. LOC questions:  0 - answers both questions correctly   1c. LOC commands: 0 - performs both tasks correctly   2. Best Gaze: 1 - partial gaze palsy   3. Visual: 0 - no visual loss   4.  Facial Palsy: 0 - normal symmetric movement   5a. Motor left arm: 0 - no drift, limb holds 90 (or 45) degrees for full 10 seconds   5b. Motor right arm: 0 - no drift, limb holds 90 (or 45) degrees for full 10 seconds   6a. Motor left le - no drift; leg holds 30 degree position for full 5 seconds   6b  Motor right le - no drift; leg holds 30 degree position for full 5 seconds   7. Limb Ataxia: 0 - absent   8. Sensory: 0 - normal; no sensory loss   9. Best Language:  0 - no aphasia, normal   10. Dysarthria: 0 - normal   11. Extinction and Inattention: 0 - no abnormality         Total:   1     MRS: 0        LABS:   Reviewed. Lab Results   Component Value Date    HGB 10.3 (L) 2022    WBC 5.9 2022     (H) 2022     2022    BUN 14 2022    CREATININE 0.63 2022    AST 26 2022    ALT 13 2022    MG 2.4 2022      Lab Results   Component Value Date    COVID19 Not Detected 2022    COVID19 Not Detected 11/10/2020       RADIOLOGY:   Images were personally reviewed including:    CTA Abdomen and Pelvis 2022  Moderate length dissection of the right external iliac artery which results  in severe vessel luminal narrowing, although may not be flow limiting.     Right inguinal region soft tissue inflammation at the vascular access site. No hematoma within the retroperitoneum or right groin.     Extensive airspace disease at the visualized lung bases which is nonspecific  although suspicious for multilobar pneumonia, inclusive of active COVID-19  viral pneumonia in the appropriate setting. MRA Head w/o contrast 2022  Unchanged 2 mm superior projecting aneurysm at the junction of left  DIPTI A1 segment and anterior communicating artery. No hemodynamically significant stenosis.     MRI brain w/o contrast 2022  Unremarkable brain MRI. Demetrio Motto is no acute infarction, intracranial  hemorrhage, or intracranial mass lesion.     CT/A head and neck w con 1/3/2022  1.  No acute intracranial abnormality. 2. 2 mm aneurysm at the junction of the left anterior cerebral and anterior  communicating arteries. 3. No evidence of large vessel occlusion or hemodynamically significant  stenosis involving the head and neck arteries. 4. Scattered areas of patchy and ground-glass opacities are noted at the  bilateral pulmonary apices. These are nonspecific but could indicate an  atypical/viral pneumonia, inclusive of COVID-19.         ASSESSMENT:     41yo female with pmh of hypothyroid, migraine, COVID19+ 12/24/2021.  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 reasonable to check dx dsa given persistent symptoms. MRI/MRA completed showing 2 mm aneurysm location at junction of left DIPTI and ACOMM.      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 Loaded asa 325 rectal and Plavix 300. Recommendations:     --sbp 110-140  -- Continue ASA 81 and Plavix 75 mg daily  -- CTA Abdomen 1 month after discharge before seeing Dr. Jennie Hardy  -- MRA head 3 month after discharge before seeing Dr. Patrick Chadwick  --f/u Dr Jennie Hardy 1 month after discharge, Dr Patrick Chadwick 3 mo after discharge.     Romeo Garduno, MS4  Stroke, [facility]  [facility]  Electronically signed 1/12/2022 at 7:26 AM

## 2022-01-13 ENCOUNTER — CARE COORDINATION (OUTPATIENT)
Dept: CASE MANAGEMENT | Age: 47
End: 2022-01-13

## 2022-01-13 NOTE — PROGRESS NOTES
CLINICAL PHARMACY NOTE: MEDS TO BEDS    Total # of Prescriptions Filled: 2   The following medications were delivered to the patient:  · Aspirin 81mg chew  · Clopidogrel 75mg tabs    Additional Documentation:  Delivered to patient in room 543 at 5:57pm. Patient only wanted these two rxs. RN was in room when I arrived, no visitors. No copay.  HR

## 2022-01-13 NOTE — CARE COORDINATION
[facility] Transitions Initial Follow Up Call    Call within 2 business days of discharge: Yes    Patient: Qing Ayala Patient : 1975   MRN: [id number]  Reason for Admission: Headache  Discharge Date: 22 RARS: Readmission Risk Score: 9.8 ( )      Last Discharge  [place] [id number]       Complaint Diagnosis Description Type Department Provider    22 Headache Acute nonintractable headache, unspecified headache type . .. ED to Hosp-Admission (Discharged) (ADMITTED) Dino Chow MD; Rachel Montaño. .. First attempt at initial 24 hour CTN call     Spoke with:  Called to speak with patient for initial  transition of care. Left HIPPA compliant voice message with contact information [phone] for a call  Back with an update. Autumn Haddad returned call to CTN states she is feeling good, denies headache today denies chest pain,SOB, dizziness, she is eating and drinking well, medications reviewed and taking all except zofran , tesslon pearls, and protonix she is waiting to pick them up until she gets money. Incision in groin is swollen but looks good has some tightness in leg around incision. Has f/u tomorrow has no other concerns. Facility: [facility] services provided:  Obtained and reviewed discharge summary and/or continuity of care documents  Transitions of Care Initial Call    Was this an external facility discharge? No     Challenges to be reviewed by the provider   Additional needs identified to be addressed with provider: No  none             Method of communication with provider : none      Advance Care Planning:   Does patient have an Advance Directive: reviewed and current, reviewed and needs to be updated, not on file; education provided, decision maker updated and referral to internal ACP facilitator. Was this a readmission?  No  Patient stated reason for admission: Headache  Patients top risk factors for readmission: medication management    Care Transition Nurse (CTN) contacted the patient by telephone to perform post hospital discharge assessment. Verified name and  with patient as identifiers. Provided introduction to self, and explanation of the CTN role. CTN reviewed discharge instructions, medical action plan and red flags with patient who verbalized understanding. Patient given an opportunity to ask questions and does not have any further questions or concerns at this time. Were discharge instructions available to patient? Yes. Reviewed appropriate site of care based on symptoms and resources available to patient including: PCP and Specialist. The patient agrees to contact the PCP office for questions related to their healthcare. Medication reconciliation was performed with patient, who verbalizes understanding of administration of home medications. Advised obtaining a 90-day supply of all daily and as-needed medications. Covid Risk Education     Educated patient about risk for severe COVID-19 due to risk factors according to CDC guidelines. LPN CC reviewed discharge instructions, medical action plan and red flag symptoms with the patient who verbalized understanding. Discussed COVID vaccination status: Yes. Education provided on COVID-19 vaccination as appropriate. Discussed exposure protocols and quarantine with CDC Guidelines. Patient was given an opportunity to verbalize any questions and concerns and agrees to contact LPN CC or health care provider for questions related to their healthcare. Reviewed and educated patient on any new and changed medications related to discharge diagnosis. Was patient discharged with a pulse oximeter? No Discussed and confirmed pulse oximeter discharge instructions and when to notify provider or seek emergency care. LPN CC provided contact information. No further follow-up call indicated based on severity of symptoms and risk factors.       Care Transitions 24 Hour Call    Care Transitions Interventions         Follow Up  Future Appointments   Date Time Provider Scot Mcdaniels   1/14/2022  9:30 AM Jose C Wiley MD Neuro Endo [facility] Dailey LPN

## 2022-01-14 ENCOUNTER — OFFICE VISIT (OUTPATIENT)
Dept: NEUROLOGY | Age: 47
End: 2022-01-14
Payer: COMMERCIAL

## 2022-01-14 VITALS
WEIGHT: 153 LBS | HEART RATE: 111 BPM | SYSTOLIC BLOOD PRESSURE: 105 MMHG | OXYGEN SATURATION: 99 % | HEIGHT: 65 IN | BODY MASS INDEX: 25.49 KG/M2 | DIASTOLIC BLOOD PRESSURE: 71 MMHG

## 2022-01-14 DIAGNOSIS — I67.1 CEREBRAL ANEURYSM: ICD-10-CM

## 2022-01-14 DIAGNOSIS — I67.1 ANTERIOR CEREBRAL ANEURYSM: ICD-10-CM

## 2022-01-14 DIAGNOSIS — I77.72 DISSECTION OF ILIAC ARTERY (HCC): Primary | ICD-10-CM

## 2022-01-14 PROCEDURE — G8419 CALC BMI OUT NRM PARAM NOF/U: HCPCS | Performed by: PSYCHIATRY & NEUROLOGY

## 2022-01-14 PROCEDURE — 1036F TOBACCO NON-USER: CPT | Performed by: PSYCHIATRY & NEUROLOGY

## 2022-01-14 PROCEDURE — G8484 FLU IMMUNIZE NO ADMIN: HCPCS | Performed by: PSYCHIATRY & NEUROLOGY

## 2022-01-14 PROCEDURE — 99214 OFFICE O/P EST MOD 30 MIN: CPT | Performed by: PSYCHIATRY & NEUROLOGY

## 2022-01-14 PROCEDURE — 1111F DSCHRG MED/CURRENT MED MERGE: CPT | Performed by: PSYCHIATRY & NEUROLOGY

## 2022-01-14 PROCEDURE — G8427 DOCREV CUR MEDS BY ELIG CLIN: HCPCS | Performed by: PSYCHIATRY & NEUROLOGY

## 2022-01-14 RX ORDER — DOXYCYCLINE HYCLATE 100 MG/1
CAPSULE ORAL
COMMUNITY
End: 2022-05-26

## 2022-01-14 RX ORDER — METHOCARBAMOL 750 MG/1
TABLET, FILM COATED ORAL
COMMUNITY

## 2022-01-14 RX ORDER — SULFAMETHOXAZOLE AND TRIMETHOPRIM 800; 160 MG/1; MG/1
TABLET ORAL
COMMUNITY
End: 2022-05-26

## 2022-01-14 RX ORDER — CLINDAMYCIN PHOSPHATE 600 MG/50ML
600 INJECTION INTRAVENOUS
COMMUNITY
End: 2022-01-14 | Stop reason: SDUPTHER

## 2022-01-14 RX ORDER — CEPHALEXIN 500 MG/1
CAPSULE ORAL
COMMUNITY
End: 2022-05-26

## 2022-01-14 RX ORDER — CIPROFLOXACIN 500 MG/1
TABLET, FILM COATED ORAL
COMMUNITY
End: 2022-05-26

## 2022-01-14 RX ORDER — CLINDAMYCIN PHOSPHATE 150 MG/ML
600 INJECTION, SOLUTION INTRAVENOUS
COMMUNITY
End: 2022-05-26

## 2022-01-14 RX ORDER — IBUPROFEN 800 MG/1
TABLET ORAL
COMMUNITY

## 2022-01-14 NOTE — PROGRESS NOTES
Endovascular Neurosurgery Clinic Note      Reason for evaluation: ed f/u L A2 aneurysm, R external iliac dissection    SUBJECTIVE:   History of Chief Complaint:    39yo female with pmh of hypothyroid, migraine, COVID19+ 12/24/2021.  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 DSA 1/9/2022 showed L A2 DIPTI origin aneurysm, dimensions height 1.22mm, length 1.48mm, width 3.05mm, neck 3.05mm, and also dissection flap in the R external iliac artery. Since discharge has some RLE muscle tightness and paresthesia with exertion (e.g. walking up a flight of stairs). Otherwise pt denies any new focal symptoms. Pt's headaches have since back to baseline. She continues to have L eye scotoma. Allergies  is allergic to bee venom and percocet [oxycodone-acetaminophen]. Medications  Prior to Admission medications    Medication Sig Start Date End Date Taking? Authorizing Provider   sulfamethoxazole-trimethoprim (BACTRIM DS;SEPTRA DS) 800-160 MG per tablet sulfamethoxazole 800 mg-trimethoprim 160 mg tablet   Take 1 tablet twice a day by oral route for 10 days. Patient not taking: Reported on 1/14/2022    Historical Provider, MD   methocarbamol (ROBAXIN) 750 MG tablet methocarbamol 750 mg tablet    Historical Provider, MD   ibuprofen (ADVIL;MOTRIN) 800 MG tablet ibuprofen 800 mg tablet   Take 1 tablet 3 times a day by oral route as directed for 7 days. Historical Provider, MD   doxycycline hyclate (VIBRAMYCIN) 100 MG capsule doxycycline hyclate 100 mg capsule    Historical Provider, MD   clindamycin (CLEOCIN PHOSPHATE) 600 MG/4ML SOLN injection 600 mg    Historical Provider, MD   ciprofloxacin (CIPRO) 500 MG tablet ciprofloxacin 500 mg tablet    Historical Provider, MD   cephALEXin (KEFLEX) 500 MG capsule cephalexin 500 mg capsule   Take 1 capsule twice a day by oral route as directed for 10 days.     Historical Provider, MD   ondansetron (ZOFRAN ODT) 4 MG disintegrating tablet Take 1 tablet by mouth every 8 hours as needed for Nausea or Vomiting  Patient not taking: Reported on 2022  MARQUES Julian CNP   benzonatate (TESSALON) 100 MG capsule Take 1 capsule by mouth every 8 hours as needed for Cough  Patient not taking: Reported on 2022  MARQUES Julian CNP   pantoprazole (PROTONIX) 20 MG tablet Take 2 tablets by mouth daily  Patient not taking: Reported on 2022   MARQUES Julian CNP   aspirin 81 MG chewable tablet Take 1 tablet by mouth daily 22   MARQUES Julian CNP   clopidogrel (PLAVIX) 75 MG tablet Take 1 tablet by mouth daily 22   MARQUES Julian CNP   Multiple Vitamins-Minerals (THERAPEUTIC MULTIVITAMIN-MINERALS) tablet Take 1 tablet by mouth daily    Historical Provider, MD   levothyroxine (SYNTHROID) 200 MCG tablet Take 0.5 tablets by mouth Daily. Patient not taking: Reported on 2022   Azeb So, DO    Scheduled Meds:  Continuous Infusions:  PRN Meds:.  Past Medical History   has a past medical history of Heart palpitations and Hypothyroidism. Past Surgical History   has a past surgical history that includes  section; Dilation and curettage of uterus; Breast enhancement surgery; Hand surgery (Right); and Umbilical hernia repair (N/A, 3/7/2019). Social History   reports that she has quit smoking. Her smoking use included cigarettes. She has never used smokeless tobacco.   reports no history of alcohol use. reports no history of drug use. Family History  family history includes Diabetes in an other family member.     Review of Systems:  CONSTITUTIONAL:  negative for fevers, chills, fatigue and malaise    EYES:  negative for double vision, blurred vision and photophobia     HEENT:  negative for tinnitus, epistaxis and sore throat    RESPIRATORY:  negative for cough, shortness of breath, wheezing    CARDIOVASCULAR:  negative for chest pain, palpitations, syncope, edema GASTROINTESTINAL:  negative for nausea, vomiting    GENITOURINARY:  negative for incontinence    MUSCULOSKELETAL:  negative for neck or back pain    NEUROLOGICAL:  Negative for weakness and tingling  negative for headaches and dizziness    PSYCHIATRIC:  negative for anxiety      Review of systems otherwise negative. OBJECTIVE:     Vitals:    22 0942   BP: 105/71   Pulse: 111   SpO2: 99%        General:  Gen: normal habitus, NAD  HEENT: NCAT, mucosa moist  Cvs: RRR, S1 S2 normal  Resp: symmetric unlabored breathing  Abd: s/nd/nt  Ext: no edema  Skin: no lesions seen, warm and dry    Neuro:  Gen: awake and alert, oriented x3. Lang/speech: no aphasia or dysarthria. Follows commands. CN: PERRL, EOMI, VFF, V1-3 intact, face symmetric, hearing intact, shoulder shrug symmetric, tongue midline  Motor: grossly 5/5 UE and LE b/l  Sense: LT intact in all 4 ext. Coord: FTN and HTS intact b/l  DTR: deferred  Gait: narrow base gait    NIH Stroke Scale:   1a  Level of consciousness: 0 - alert; keenly responsive   1b. LOC questions:  0 - answers both questions correctly   1c. LOC commands: 0 - performs both tasks correctly   2. Best Gaze: 0 - normal   3. Visual: 0 - no visual loss   4. Facial Palsy: 0 - normal symmetric movement   5a. Motor left arm: 0 - no drift, limb holds 90 (or 45) degrees for full 10 seconds   5b. Motor right arm: 0 - no drift, limb holds 90 (or 45) degrees for full 10 seconds   6a. Motor left le - no drift; leg holds 30 degree position for full 5 seconds   6b  Motor right le - no drift; leg holds 30 degree position for full 5 seconds   7. Limb Ataxia: 0 - absent   8. Sensory: 0 - normal; no sensory loss   9. Best Language:  0 - no aphasia, normal   10. Dysarthria: 0 - normal   11. Extinction and Inattention: 0 - no abnormality         Total:   0     MRS: 0      LABS:   Reviewed.   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       Right inguinal region soft tissue inflammation at the vascular access site. No hematoma within the retroperitoneum or right groin.       Extensive airspace disease at the visualized lung bases which is nonspecific   although suspicious for multilobar pneumonia, inclusive of active COVID-19   viral pneumonia in the appropriate setting. DSA 1/9/2022  --right and anterior facing blister left A2 anterior cerebral artery origin aneurysm, dimensions height 1.22mm, length 1.48mm, width 3.05mm, neck 3.05mm. --dissection flap in the right external iliac artery, there is good anterograde flow into the distal branches. Patient was loaded with aspirin 300mg rectally and Plavix 300mg orally at this time. ASSESSMENT:   41yo female with pmh of hypothyroid, migraine, COVID19+ 12/24/2021.  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 DSA 1/9/2022 showed L A2 DIPTI origin aneurysm, dimensions height 1.22mm, length 1.48mm, width 3.05mm, neck 3.05mm, and also dissection flap in the R external iliac artery. Pt has some RLE muscle tightness and paresthesia with exertion. Otherwise pt is stable. PLAN:   --ophtho eval for L eye scotoma  --pt advised to avoid strenuous activity or lifting objects heavier than 10lbs for the next 7-10 days. This includes step aerobics, strenuous sex, lifting heavy objects at work (pt works in a  [employer]).   --work note to be provided   --check ct pelvis w and wo con with femoral runoff in 1 mo  --check mra head wo con in 3 mo  --continue asa 81 and plavix 75 for now  --f/u dr lopez in 3 mo     Case discussed with Dr. Serina Woodall attending.  Pt is followed by dr. Johnie Koehler MD, PhD  Stroke, [facility]  Electronically signed 1/14/2022 at 9:46 AM

## 2022-02-15 ENCOUNTER — OFFICE VISIT (OUTPATIENT)
Dept: NEUROLOGY | Age: 47
End: 2022-02-15
Payer: COMMERCIAL

## 2022-02-15 VITALS
DIASTOLIC BLOOD PRESSURE: 72 MMHG | BODY MASS INDEX: 25.49 KG/M2 | WEIGHT: 153 LBS | HEIGHT: 65 IN | SYSTOLIC BLOOD PRESSURE: 112 MMHG | HEART RATE: 83 BPM | OXYGEN SATURATION: 99 %

## 2022-02-15 DIAGNOSIS — M62.89 MUSCLE STIFFNESS: ICD-10-CM

## 2022-02-15 DIAGNOSIS — G43.019 INTRACTABLE MIGRAINE WITHOUT AURA AND WITHOUT STATUS MIGRAINOSUS: ICD-10-CM

## 2022-02-15 DIAGNOSIS — I77.72 DISSECTION OF ILIAC ARTERY (HCC): Primary | ICD-10-CM

## 2022-02-15 DIAGNOSIS — I67.1 ANTERIOR CEREBRAL ANEURYSM: ICD-10-CM

## 2022-02-15 PROCEDURE — 99215 OFFICE O/P EST HI 40 MIN: CPT | Performed by: STUDENT IN AN ORGANIZED HEALTH CARE EDUCATION/TRAINING PROGRAM

## 2022-02-15 RX ORDER — MAGNESIUM OXIDE 400 MG/1
400 TABLET ORAL DAILY
Qty: 30 TABLET | Refills: 3 | Status: SHIPPED | OUTPATIENT
Start: 2022-02-15 | End: 2022-08-23 | Stop reason: SDUPTHER

## 2022-02-15 ASSESSMENT — ENCOUNTER SYMPTOMS
EYE ITCHING: 0
EYE PAIN: 0
EYE REDNESS: 0
EYE DISCHARGE: 0
BACK PAIN: 0
PHOTOPHOBIA: 0

## 2022-02-15 NOTE — PROGRESS NOTES
[address] [place] [address] [address] [facility]  Dept: [phone]  Dept Fax: [phone]    NEUROLOGY NEW PATIENT NOTE       PATIENT NAME: Felicitas Edward  PATIENT MRN: [id number]  PRIMARY CARE PHYSICIAN: Darci Polk MD      HPI:      Felicitas Edward is a 41 y.o. female   HPI  1/12/2022  Felicitas Edward is a 41 y.o. female with past medical history of hypothyroidism, migraines, COVID-19 in December 2021, who was admitted for the management of headache. She describes her migraines as frontal, throbbing, and daily for the last several months. 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 She was treated with migraine cocktail and wished to go home but presented to the emergency room again on 1/7 for a change in the headache with more intense pain focused around the cranial apex. Upon further discussion with the patient it was found that she has had headaches most of her life and given her daily headaches at home for the last several months she was started on prophylactic Topamax 25 mg nightly. Unfortunately she developed heart palpitations so Topamax was stopped and patient did not desire further pharmaceutical treatment at this time. MRI brain was unremarkable. MRA shows a unchanged 2 mm superior projecting aneurysm at the junction of the left DIPTI A1 segment and anterior communicating artery. Endovascular was consulted and took the patient for an angiogram on 1/9. 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 The case was discussed with endovascular team who felt the dissection looked improved as compared to initial and recommend repeat CTA abdomen/pelvis in one month as well as repeat MRA head in 3 months and further discussion of possible aneurysm intervention to be done in the outpatient setting. No intervention planned for this admission from endovascular standpoint. Patient's headache did resolve, she has no new acute issues however her left eye J shaped negative scotoma has remained the same during her admission. Interval hx  The Patient was seen and examined at bedside  Is vitally stable alert oriented x3  The patient stated that she has intermittent very mild headaches with no nausea or vomiting, no photophobia, resolved with baby aspirin. She mentioned intermittent right lower extremity weakness after walking 1 minute. The patient was seen by endovascular clinic on 2022 and recommended to continue aspirin and Plavix, to follow-up with ophthalmology for left eye scotoma, to check CT pelvis with and without contrast with femoral runoff in a month, MRA of the head without contrast in 3 months. To follow-up with endovascular clinic in 3 months.         PREVIOUS WORKUP:     Lab Results   Component Value Date    WBC 5.9 2022    HGB 10.3 (L) 2022    HCT 34.2 (L) 2022    MCV 84.7 2022     (H) 2022       Past Medical History:   Diagnosis Date    Heart palpitations     Hypothyroidism     s/p radioiodine ablation         Past Surgical History:   Procedure Laterality Date    BREAST ENHANCEMENT SURGERY      x3     SECTION      failure to progress    DILATION AND CURETTAGE OF UTERUS      s/p SAB x 3    HAND SURGERY Right     CRPP R fifth [facility]    UMBILICAL HERNIA REPAIR N/A 3/9/5524    HERNIA UMBILICAL REPAIR W/MESH performed by Janine Gutierrez MD at [address] [place] [address] Marital status: Single     Spouse name: Not on file    Number of children: Not on file    Years of education: Not on file    Highest education level: Not on file   Occupational History    Not on file   Tobacco Use    Smoking status: Former Smoker     Types: Cigarettes    Smokeless tobacco: Never Used    Tobacco comment: ''Lots of 2nd hand smoke''   Vaping Use    Vaping Use: Some days   Substance and Sexual Activity    Alcohol use: No    Drug use: No    Sexual activity: Yes     Partners: Male   Other Topics Concern    Not on file   Social History Narrative    Not on file     Social Determinants of Health     Financial Resource Strain:     Difficulty of Paying Living Expenses: Not on file   Food Insecurity:     Worried About [address] [place] [employer] in the Last Year: Not on file    Adam of Food in the Last Year: Not on file   Transportation Needs:     Lack of Transportation (Medical): Not on file    Lack of Transportation (Non-Medical):  Not on file   Physical Activity:     Days of Exercise per Week: Not on file    Minutes of Exercise per Session: Not on file   Stress:     Feeling of Stress : Not on file   Social Connections:     Frequency of Communication with Friends and Family: Not on file    Frequency of Social Gatherings with Friends and Family: Not on file    Attends Christian Services: Not on file    Active Member of [address] [place] [address] [employer] or Organizations: Not on file    Attends Club or Organization Meetings: Not on file    Marital Status: Not on file   Intimate Partner Violence:     Fear of Current or Ex-Partner: Not on file    Emotionally Abused: Not on file    Physically Abused: Not on file    Sexually Abused: Not on file   Housing Stability:     Unable to Pay for Housing in the Last Year: Not on file    Number of [place] in the Last Year: Not on file    Unstable Housing in the Last Year: Not on file        Current Outpatient Medications   Medication Sig Dispense Refill    sulfamethoxazole-trimethoprim (BACTRIM DS;SEPTRA DS) 800-160 MG per tablet sulfamethoxazole 800 mg-trimethoprim 160 mg tablet   Take 1 tablet twice a day by oral route for 10 days. (Patient not taking: Reported on 1/14/2022)      methocarbamol (ROBAXIN) 750 MG tablet methocarbamol 750 mg tablet (Patient not taking: Reported on 1/14/2022)      ibuprofen (ADVIL;MOTRIN) 800 MG tablet ibuprofen 800 mg tablet   Take 1 tablet 3 times a day by oral route as directed for 7 days. (Patient not taking: Reported on 1/14/2022)      doxycycline hyclate (VIBRAMYCIN) 100 MG capsule doxycycline hyclate 100 mg capsule (Patient not taking: Reported on 1/14/2022)      clindamycin (CLEOCIN PHOSPHATE) 600 MG/4ML SOLN injection 600 mg (Patient not taking: Reported on 1/14/2022)      ciprofloxacin (CIPRO) 500 MG tablet ciprofloxacin 500 mg tablet (Patient not taking: Reported on 1/14/2022)      cephALEXin (KEFLEX) 500 MG capsule cephalexin 500 mg capsule   Take 1 capsule twice a day by oral route as directed for 10 days. (Patient not taking: Reported on 1/14/2022)      pantoprazole (PROTONIX) 20 MG tablet Take 2 tablets by mouth daily (Patient not taking: Reported on 1/13/2022) 30 tablet 0    aspirin 81 MG chewable tablet Take 1 tablet by mouth daily 30 tablet 3    clopidogrel (PLAVIX) 75 MG tablet Take 1 tablet by mouth daily 30 tablet 3    Multiple Vitamins-Minerals (THERAPEUTIC MULTIVITAMIN-MINERALS) tablet Take 1 tablet by mouth daily (Patient not taking: Reported on 1/14/2022)      levothyroxine (SYNTHROID) 200 MCG tablet Take 0.5 tablets by mouth Daily. (Patient taking differently: Take 125 mcg by mouth Daily ) 30 tablet 3     No current facility-administered medications for this visit. Allergies   Allergen Reactions    Bee Venom     Percocet [Oxycodone-Acetaminophen] Nausea And Vomiting        REVIEW OF SYSTEMS:     Review of Systems   Constitutional: Negative for diaphoresis, fatigue and fever. Eyes: Positive for visual disturbance.  Negative for photophobia, pain, discharge, redness and itching. Endocrine: Negative for polyuria. Genitourinary: Negative for dysuria. Musculoskeletal: Negative for back pain and gait problem. Neurological: Negative for dizziness, tremors, seizures, syncope, facial asymmetry, speech difficulty, weakness, light-headedness, numbness and headaches. Psychiatric/Behavioral: Negative for agitation. VITALS  There were no vitals taken for this visit. PHYSICAL EXAMINATION:     Physical Exam   General appearance: cooperative  Skin: no rash or skin lesions. HEENT: normocephalic  Optic Fundi: deferred  Neck: supple, no cervcical adenopathy or carotid bruit  Lungs: clear to auscultation  Heart: Regular rate and rhythm, normal S1, S2. No murmurs, clicks or gallops.   Peripheral pulses: radial pulses palpable  Abdominal: BS present, soft, NT, ND  Extremities: no edema    NEUROLOGICAL EXAMINATION:     GENERAL  Appears comfortable and in no distress   HEENT  NC/ AT   HEART  S1 and S2 heard; palpation of pulses: radial pulse    NECK  Supple and no bruits heard   MENTAL STATUS:  Alert, oriented, intact memory, no confusion, normal speech, normal language, no hallucination or delusion   CRANIAL NERVES: II     -      Visual fields intact to confrontation  III,IV,VI -  PERR, EOMs full, no ptosis  V     -     Normal facial sensation   VII    -     Normal facial symmetry  VIII   -     Intact hearing   IX,X -     Symmetrical palate  XI    -     Symmetrical shoulder shrug  XII   -     Midline tongue, no atrophy    MOTOR FUNCTION: RUE: Significant for good strength of grade 5/5 in proximal and distal muscle groups   LUE: Significant for good strength of grade 5/5 in proximal and distal muscle groups   RLE: Significant for good strength of grade 5/5 in proximal and distal muscle groups   LLE: Significant for good strength of grade 5/5 in proximal and distal muscle groups      Normal bulk, normal tone and no involuntary movements, no tremor SENSORY FUNCTION:  Normal touch, normal pin, normal vibration, normal proprioception   CEREBELLAR FUNCTION:  Intact fine motor control over upper limbs and lower limbs   REFLEX FUNCTION:  Symmetric in upper and lower extremities, no Babinski sign   STATION and GAIT  Normal gait and tandem station, normal tip toes and heel walking         ASSESSMENT:      41 y.o. female with past medical history of hypothyroidism, migraine, COVID-19 in December 2021 who was admitted for the management of headache. Was found to have left A2 DIPTI origin aneurysm. 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 On aspirin and Plavix  Follow-up CTA abdomen. Follow-up MRA head. PLAN:     1. Continue aspirin and Plavix. 2. Follow-up with ophthalmology clinic  3. Follow-up with endovascular clinic in 2 months. 4. To get CTA of the abdomen in 2 weeks  5. To get MRA of the head without contrast in 2 months. 6. Start magnesium oxide 400 mg at night for history of mild headaches. 7. To follow-up in neurology clinic in 4 months    Ms. Bin Dominguez received counseling on the following healthy behaviors: medical compliance, smoking cessation, blood pressure control, regular follow up with primary doctor.         Electronically signed by Precious Mirza MD on 2/15/2022 at 3:42 PM

## 2022-02-18 ENCOUNTER — TELEPHONE (OUTPATIENT)
Dept: NEUROLOGY | Age: 47
End: 2022-02-18

## 2022-02-18 NOTE — TELEPHONE ENCOUNTER
Patient called in stating a new order for CTA was to be placed. Per your note and plan, is she to use the orders that are already placed? PLAN:      1. Continue aspirin and Plavix. 2. Follow-up with ophthalmology clinic  3. Follow-up with endovascular clinic in 2 months. 4. To get CTA of the abdomen in 2 weeks  5. To get MRA of the head without contrast in 2 months. 6. Start magnesium oxide 400 mg at night for history of mild headaches.   7. To follow-up in neurology clinic in 4 months

## 2022-02-18 NOTE — TELEPHONE ENCOUNTER
The order was placed by [facility] on 1/12/2022 for CTA abdomen. To get it scheduled as outpatient within a week.

## 2022-03-04 ENCOUNTER — HOSPITAL ENCOUNTER (OUTPATIENT)
Dept: CT IMAGING | Age: 47
Discharge: HOME | End: 2022-03-06
Payer: COMMERCIAL

## 2022-03-04 DIAGNOSIS — I77.72 ILIAC ARTERY DISSECTION (HCC): ICD-10-CM

## 2022-03-04 PROCEDURE — 6360000004 HC RX CONTRAST MEDICATION: Performed by: NURSE PRACTITIONER

## 2022-03-04 PROCEDURE — 74174 CTA ABD&PLVS W/CONTRAST: CPT

## 2022-03-04 RX ADMIN — IOPAMIDOL 100 ML: 755 INJECTION, SOLUTION INTRAVENOUS at 12:09

## 2022-04-26 ENCOUNTER — TELEPHONE (OUTPATIENT)
Dept: NEUROSURGERY | Age: 47
End: 2022-04-26

## 2022-04-26 NOTE — TELEPHONE ENCOUNTER
Patient called and stated she is experiencing swelling in both her feet. Patient states having them elevated provides some relief but wants to know if there is anything that could be causing this.
37

## 2022-04-28 ENCOUNTER — HOSPITAL ENCOUNTER (OUTPATIENT)
Dept: MRI IMAGING | Age: 47
Discharge: HOME | End: 2022-04-30
Payer: COMMERCIAL

## 2022-04-28 DIAGNOSIS — I67.1 CEREBRAL ANEURYSM: ICD-10-CM

## 2022-04-28 PROCEDURE — 70544 MR ANGIOGRAPHY HEAD W/O DYE: CPT

## 2022-05-09 ENCOUNTER — OFFICE VISIT (OUTPATIENT)
Dept: NEUROLOGY | Age: 47
End: 2022-05-09
Payer: MEDICAID

## 2022-05-09 VITALS
WEIGHT: 175 LBS | HEART RATE: 75 BPM | SYSTOLIC BLOOD PRESSURE: 121 MMHG | DIASTOLIC BLOOD PRESSURE: 83 MMHG | HEIGHT: 65 IN | BODY MASS INDEX: 29.16 KG/M2 | TEMPERATURE: 97.5 F

## 2022-05-09 DIAGNOSIS — I67.1 ANTERIOR COMMUNICATING ARTERY ANEURYSM: Primary | ICD-10-CM

## 2022-05-09 PROCEDURE — 99215 OFFICE O/P EST HI 40 MIN: CPT | Performed by: PSYCHIATRY & NEUROLOGY

## 2022-05-09 RX ORDER — AMITRIPTYLINE HYDROCHLORIDE 25 MG/1
25 TABLET, FILM COATED ORAL NIGHTLY
Qty: 30 TABLET | Refills: 5 | Status: SHIPPED | OUTPATIENT
Start: 2022-05-09 | End: 2022-06-20

## 2022-05-09 RX ORDER — PROPRANOLOL HCL 60 MG
60 CAPSULE, EXTENDED RELEASE 24HR ORAL DAILY
Qty: 30 CAPSULE | Refills: 3 | Status: SHIPPED | OUTPATIENT
Start: 2022-05-09 | End: 2022-05-25 | Stop reason: SDUPTHER

## 2022-05-09 NOTE — PROGRESS NOTES
Neurocritical Care, Stroke & Neurointerventional Note    [address]   [address] [place] [address],  [address] , [address] [place] [address]   P: [phone]  Endovascular Neurosurgery Consult    Pt Name: Leticia Turcios  MRN: [id number]  [place]: 1975  Date of evaluation: 5/9/2022  Primary Care Physician: Daysi Griffin MD    Reason for evaluation: Cerebral small aneurysm < 3 mm     SUBJECTIVE:   History of Chief Complaint:    Leticia Turcios is a 41 y.o. female who presents with hx of Covid, migraine HA, hypo-thyroidism, presented with:    2.67 mm Acomm aneurysm at left A1/2 junction discovered during evaluation for refractory headache    Allergies  is allergic to bee venom and percocet [oxycodone-acetaminophen]. Medications  Prior to Admission medications    Medication Sig Start Date End Date Taking? Authorizing Provider   magnesium oxide (MAG-OX) 400 MG tablet Take 1 tablet by mouth daily 2/15/22  Yes Jesús Castro MD   aspirin 81 MG chewable tablet Take 1 tablet by mouth daily 1/13/22  Yes MARQUES Staples CNP   clopidogrel (PLAVIX) 75 MG tablet Take 1 tablet by mouth daily 1/13/22  Yes MARQUES Staples CNP   sulfamethoxazole-trimethoprim (BACTRIM DS;SEPTRA DS) 800-160 MG per tablet sulfamethoxazole 800 mg-trimethoprim 160 mg tablet   Take 1 tablet twice a day by oral route for 10 days. Patient not taking: Reported on 1/14/2022    Historical Provider, MD   methocarbamol (ROBAXIN) 750 MG tablet methocarbamol 750 mg tablet  Patient not taking: Reported on 1/14/2022    Historical Provider, MD   ibuprofen (ADVIL;MOTRIN) 800 MG tablet ibuprofen 800 mg tablet   Take 1 tablet 3 times a day by oral route as directed for 7 days.   Patient not taking: Reported on 1/14/2022    Historical Provider, MD   doxycycline hyclate (VIBRAMYCIN) 100 MG capsule doxycycline hyclate 100 mg capsule  Patient not taking: Reported on 1/14/2022    Historical Provider, MD   clindamycin (CLEOCIN PHOSPHATE) 600 MG/4ML SOLN injection 600 mg  Patient not taking: Reported on 2022    Historical Provider, MD   ciprofloxacin (CIPRO) 500 MG tablet ciprofloxacin 500 mg tablet  Patient not taking: Reported on 2022    Historical Provider, MD   cephALEXin (KEFLEX) 500 MG capsule cephalexin 500 mg capsule   Take 1 capsule twice a day by oral route as directed for 10 days. Patient not taking: Reported on 2022    Historical Provider, MD   pantoprazole (PROTONIX) 20 MG tablet Take 2 tablets by mouth daily  Patient not taking: Reported on 2022   Rey Gutierrez, APRN - CNP   Multiple Vitamins-Minerals (THERAPEUTIC MULTIVITAMIN-MINERALS) tablet Take 1 tablet by mouth daily  Patient not taking: Reported on 2022    Historical Provider, MD   levothyroxine (SYNTHROID) 200 MCG tablet Take 0.5 tablets by mouth Daily. Patient not taking: Reported on 2022   See-Jacey So, DO    Scheduled Meds:  Continuous Infusions:  PRN Meds:.  Past Medical History   has a past medical history of Heart palpitations and Hypothyroidism. Past Surgical History   has a past surgical history that includes  section; Dilation and curettage of uterus; Breast enhancement surgery; Hand surgery (Right); and Umbilical hernia repair (N/A, 3/7/2019). Social History   reports that she has quit smoking. Her smoking use included cigarettes. She has never used smokeless tobacco.   reports no history of alcohol use. reports no history of drug use. Family History  family history includes Diabetes in an other family member.     Review of Systems:  CONSTITUTIONAL:  negative for fevers, chills, fatigue and malaise    EYES:  negative for double vision, blurred vision and photophobia     HEENT:  negative for tinnitus, epistaxis and sore throat    RESPIRATORY:  negative for cough, shortness of breath, wheezing    CARDIOVASCULAR:  negative for chest pain, palpitations, syncope, edema    GASTROINTESTINAL:  negative for nausea, vomiting    GENITOURINARY:  negative for incontinence    MUSCULOSKELETAL:  negative for neck or back pain    NEUROLOGICAL:  Negative for weakness and tingling  negative for headaches and dizziness    PSYCHIATRIC:  negative for anxiety      Review of systems otherwise negative. OBJECTIVE:   Vitals: /83 (Site: Left Upper Arm, Position: Sitting, Cuff Size: Medium Adult)   Pulse 75   Temp 97.5 °F (36.4 °C)   Ht 5' 5\" (1.651 m)   Wt 175 lb (79.4 kg)   BMI 29.12 kg/m²   General appearance: Lying in bed, NAD  HEENT: Head: Normocephalic, no lesions, without obvious abnormality. Neck: no adenopathy, no carotid bruit, no JVD, supple, symmetrical, trachea midline and thyroid not enlarged, symmetric, no tenderness/mass/nodules  Lungs: clear to auscultation bilaterally  Heart: regular rate and rhythm, S1, S2 normal, no murmur, click, rub or gallop  Abdomen: soft, non-tender; nondistended, bowel sounds normal  Extremities: extremities normal, atraumatic, no cyanosis or edema    Neurologic: Mental status: Alert, oriented, thought content appropriate, Alert and oriented x 3,   Language/speech: intact language, attention, knowledge  CN: II-XII intact  MOTOR: 5/5 throughout with normal tone and bulk for age  SENSORY: LT and PP symmetrical and intact  COORDINATION: F to N and Gait intact for age    LABS:   No results for input(s): WBC, HGB, HCT, PLT, NA, K, CL, CO2, BUN, CREATININE, MG, PHOS, CALCIUM, PTT, INR, AST, ALT, BILITOT, BILIDIR, NITRU, COLORU, BACTERIA in the last 72 hours. Invalid input(s): PT, WBCU, RBCU, LEUKOCYTESUA  No results for input(s): ALKPHOS, ALT, AST, BILITOT, BILIDIR, LABALBU, AMYLASE, LIPASE in the last 72 hours.     RADIOLOGY:   Images were personally reviewed includin2022   2.67 mm Acomm aneurysm at left A1/2 junction         IMPRESSIONS:   Donny Mata is a 41 y.o. female who presents with hx of Covid, migraine HA, hypo-thyroidism, presented with:    2.67 mm Acomm aneurysm at left A1/2 junction discovered during evaluation for refractory headache    Aneurysm too small for treatment, monitoring only for now  HA, needs prevention medicine, very frequent, 2-3 per day, didn't tolerate   1. does not have any pertinent problems on file. PLANS:   1. MRA head  2. RTC in 6-8 months  3. ASA only   4. Inderal   5. Elavil    Consultation Visit Time:  40 minutes  Patient given educational materials - see patient instructions. Discussed use, benefit, and side effects of prescribed medications. Personally reviewed imaging with patients and all questions answered. Pt voiced understanding. Patient agreed with treatment plan. Follow up as directed below. Greater than 50% of the time was for counseling and providing answer to the patient question. The findings and the plan discussed with the patient and all her questions were answered. Thank you very much for your referral, please do not hesitate to contact me with any questions.     Silvia Champion MD Pager: [phone]  Stroke, [facility] Stroke MD Storm MD  Pager [phone]  Electronically signed 5/9/2022 at 1:21 PM

## 2022-05-26 RX ORDER — PROPRANOLOL HCL 60 MG
60 CAPSULE, EXTENDED RELEASE 24HR ORAL DAILY
Qty: 30 CAPSULE | Refills: 3 | Status: SHIPPED | OUTPATIENT
Start: 2022-05-26 | End: 2022-06-20

## 2022-06-20 ENCOUNTER — OFFICE VISIT (OUTPATIENT)
Dept: NEUROLOGY | Age: 47
End: 2022-06-20
Payer: MEDICAID

## 2022-06-20 VITALS
HEART RATE: 69 BPM | TEMPERATURE: 98.3 F | DIASTOLIC BLOOD PRESSURE: 70 MMHG | SYSTOLIC BLOOD PRESSURE: 110 MMHG | HEIGHT: 65 IN | WEIGHT: 175 LBS | BODY MASS INDEX: 29.16 KG/M2

## 2022-06-20 DIAGNOSIS — I67.1 CEREBRAL ANEURYSM: ICD-10-CM

## 2022-06-20 DIAGNOSIS — G43.009 MIGRAINE WITHOUT AURA AND WITHOUT STATUS MIGRAINOSUS, NOT INTRACTABLE: Primary | ICD-10-CM

## 2022-06-20 PROCEDURE — 99214 OFFICE O/P EST MOD 30 MIN: CPT | Performed by: PSYCHIATRY & NEUROLOGY

## 2022-06-20 RX ORDER — AMITRIPTYLINE HYDROCHLORIDE 10 MG/1
10 TABLET, FILM COATED ORAL NIGHTLY
Qty: 30 TABLET | Refills: 2 | Status: SHIPPED | OUTPATIENT
Start: 2022-06-20 | End: 2022-08-23 | Stop reason: SDUPTHER

## 2022-06-20 RX ORDER — LEVOTHYROXINE SODIUM 0.12 MG/1
TABLET ORAL
COMMUNITY
Start: 2022-03-21

## 2022-06-20 ASSESSMENT — ENCOUNTER SYMPTOMS
EYE ITCHING: 0
EYE DISCHARGE: 0
PHOTOPHOBIA: 0
EYE REDNESS: 0
EYE PAIN: 0
BACK PAIN: 0

## 2022-06-20 NOTE — PROGRESS NOTES
[address] [place] [address] [address] [facility]  Dept: [phone]  Dept Fax: [phone]    NEUROLOGY NEW PATIENT NOTE       PATIENT NAME: Bhupinder Thomas  PATIENT MRN: [id number]  PRIMARY CARE PHYSICIAN: Fidencio Gallagher MD      HPI:      Bhupinder Thomas is a 41 y.o. female   HPI  1/12/2022  Bhupinder Thomas is a 41 y.o. female with past medical history of hypothyroidism, migraines, COVID-19 in December 2021, who was admitted for the management of headache. She describes her migraines as frontal, throbbing, and daily for the last several months. 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 She was treated with migraine cocktail and wished to go home but presented to the emergency room again on 1/7 for a change in the headache with more intense pain focused around the cranial apex. Upon further discussion with the patient it was found that she has had headaches most of her life and given her daily headaches at home for the last several months she was started on prophylactic Topamax 25 mg nightly. Unfortunately she developed heart palpitations so Topamax was stopped and patient did not desire further pharmaceutical treatment at this time. MRI brain was unremarkable. MRA shows a unchanged 2 mm superior projecting aneurysm at the junction of the left DIPTI A1 segment and anterior communicating artery. Endovascular was consulted and took the patient for an angiogram on 1/9. 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 The case was discussed with endovascular team who felt the dissection looked improved as compared to initial and recommend repeat CTA abdomen/pelvis in one month as well as repeat MRA head in 3 months and further discussion of possible aneurysm intervention to be done in the outpatient setting. No intervention planned for this admission from endovascular standpoint. Patient's headache did resolve, she has no new acute issues however her left eye J shaped negative scotoma has remained the same during her admission. Interval hx  The Patient was seen and examined at bedside  Is vitally stable alert oriented x3  The patient stated that she has intermittent very mild headaches with no nausea or vomiting, no photophobia, resolved with baby aspirin. She mentioned intermittent right lower extremity weakness after walking 1 minute. The patient was seen by endovascular clinic on 1/14/2022 and recommended to continue aspirin and Plavix, to follow-up with ophthalmology for left eye scotoma, to check CT pelvis with and without contrast with femoral runoff in a month, MRA of the head without contrast in 3 months. To follow-up with endovascular clinic in 3 months. Interval history  The Patient was seen and examined at bedside  Is vitally stable alert oriented x3  The patient stated that head headaches is improving with aspirin but still getting frequency of once per day, mild, lasted for minutes, multiple locations in the head, not associated with photophobia phonophobia, no redness of the eyes, no runny nose or tearing, no tenderness, no jaw pain, no blurriness of the eyes. The patient was not able to get appointment with ophthalmology, the scotoma in the left eye is improving. Was seen by endovascular clinic recently and recommended to continue aspirin and to get MRI of the head in a year. Repeated CTA of the abdomen showed stability of the right iliac artery dissection.         PREVIOUS WORKUP: Gatherings with Friends and Family: Not on file    Attends Scientology Services: Not on file    Active Member of Clubs or Organizations: Not on file    Attends Club or Organization Meetings: Not on file    Marital Status: Not on file   Intimate Partner Violence:     Fear of Current or Ex-Partner: Not on file    Emotionally Abused: Not on file    Physically Abused: Not on file    Sexually Abused: Not on file   Housing Stability:     Unable to Pay for Housing in the Last Year: Not on file    Number of [place] in the Last Year: Not on file    Unstable Housing in the Last Year: Not on file        Current Outpatient Medications   Medication Sig Dispense Refill    levothyroxine (SYNTHROID) 125 MCG tablet       aspirin 81 MG chewable tablet Take 1 tablet by mouth daily 30 tablet 3    magnesium oxide (MAG-OX) 400 MG tablet Take 1 tablet by mouth daily 30 tablet 3    clopidogrel (PLAVIX) 75 MG tablet Take 1 tablet by mouth daily 30 tablet 3    Multiple Vitamins-Minerals (THERAPEUTIC MULTIVITAMIN-MINERALS) tablet Take 1 tablet by mouth daily       propranolol (INDERAL LA) 60 MG extended release capsule Take 1 capsule by mouth daily (Patient not taking: Reported on 6/20/2022) 30 capsule 3    amitriptyline (ELAVIL) 25 MG tablet Take 1 tablet by mouth nightly (Patient not taking: Reported on 6/20/2022) 30 tablet 5    methocarbamol (ROBAXIN) 750 MG tablet methocarbamol 750 mg tablet (Patient not taking: Reported on 1/14/2022)      ibuprofen (ADVIL;MOTRIN) 800 MG tablet ibuprofen 800 mg tablet   Take 1 tablet 3 times a day by oral route as directed for 7 days. (Patient not taking: Reported on 1/14/2022)      pantoprazole (PROTONIX) 20 MG tablet Take 2 tablets by mouth daily (Patient not taking: Reported on 1/13/2022) 30 tablet 0    levothyroxine (SYNTHROID) 200 MCG tablet Take 0.5 tablets by mouth Daily.  (Patient not taking: Reported on 6/20/2022) 30 tablet 3     No current facility-administered medications for this visit. Allergies   Allergen Reactions    Bee Venom     Percocet [Oxycodone-Acetaminophen] Nausea And Vomiting        REVIEW OF SYSTEMS:     Review of Systems   Constitutional: Negative for diaphoresis, fatigue and fever. Eyes: Positive for visual disturbance. Negative for photophobia, pain, discharge, redness and itching. Endocrine: Negative for polyuria. Genitourinary: Negative for dysuria. Musculoskeletal: Negative for back pain and gait problem. Neurological: Negative for dizziness, tremors, seizures, syncope, facial asymmetry, speech difficulty, weakness, light-headedness, numbness and headaches. Psychiatric/Behavioral: Negative for agitation. VITALS  Ht 5' 5\" (1.651 m)   Wt 175 lb (79.4 kg)   BMI 29.12 kg/m²      PHYSICAL EXAMINATION:     Physical Exam   General appearance: cooperative  Skin: no rash or skin lesions. HEENT: normocephalic  Optic Fundi: deferred  Neck: supple, no cervcical adenopathy or carotid bruit  Lungs: clear to auscultation  Heart: Regular rate and rhythm, normal S1, S2. No murmurs, clicks or gallops.   Peripheral pulses: radial pulses palpable  Abdominal: BS present, soft, NT, ND  Extremities: no edema    NEUROLOGICAL EXAMINATION:     GENERAL  Appears comfortable and in no distress   HEENT  NC/ AT   HEART  S1 and S2 heard; palpation of pulses: radial pulse    NECK  Supple and no bruits heard   MENTAL STATUS:  Alert, oriented, intact memory, no confusion, normal speech, normal language, no hallucination or delusion   CRANIAL NERVES: II     -      Visual fields intact to confrontation  III,IV,VI -  PERR, EOMs full, no ptosis  V     -     Normal facial sensation   VII    -     Normal facial symmetry  VIII   -     Intact hearing   IX,X -     Symmetrical palate  XI    -     Symmetrical shoulder shrug  XII   -     Midline tongue, no atrophy    MOTOR FUNCTION: RUE: Significant for good strength of grade 5/5 in proximal and distal muscle groups LUE: Significant for good strength of grade 5/5 in proximal and distal muscle groups   RLE: Significant for good strength of grade 5/5 in proximal and distal muscle groups   LLE: Significant for good strength of grade 5/5 in proximal and distal muscle groups      Normal bulk, normal tone and no involuntary movements, no tremor   SENSORY FUNCTION:  Normal touch, normal pin, normal vibration, normal proprioception   CEREBELLAR FUNCTION:  Intact fine motor control over upper limbs and lower limbs   REFLEX FUNCTION:  Symmetric in upper and lower extremities, no Babinski sign   STATION and GAIT  Normal gait and tandem station, normal tip toes and heel walking         ASSESSMENT:      41 y.o. female with past medical history of hypothyroidism, migraine, COVID-19 in December 2021 who was admitted for the management of headache. Was found to have left A2 DIPTI origin aneurysm. 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 On aspirin for right iliac artery dissection. On Elavil for headaches prevention. PLAN:     1. Continue aspirin. Stop Plavix. 2. Follow-up with ophthalmology clinic. Left eye scotoma is improving. 3. Follow-up with endovascular clinic in 7months with MRA of the head. 4. Repeated CTA of abdomen showed stability of the dissection. 5. Elavil 10 mg at night was ordered per endovascular clinic for headache prevention. She will start to use it. 6. Tylenol as needed. 7. To follow-up in neurology clinic in 3 months    Ms. Ravi Jerez received counseling on the following healthy behaviors: medical compliance, smoking cessation, blood pressure control, regular follow up with primary doctor.         Electronically signed by Wendie Ash MD on 6/20/2022 at 1:46 PM

## 2022-07-21 ENCOUNTER — PATIENT MESSAGE (OUTPATIENT)
Dept: NEUROLOGY | Age: 47
End: 2022-07-21

## 2022-07-22 NOTE — TELEPHONE ENCOUNTER
From: Ana Long  To: Dr. Hemal Yousif: 7/21/2022 3:06 PM EDT  Subject: Might have to see another doctor    Hi sorry to bother you. I tried to get the prescriptions filled that Dr Luis Guaman gave me, and I couldn't fill them because they said his insurance doesn't cover me. I'm also getting doctor bills that should be covered on my insurance. If you guys don't take my insurance I will have to find another doctor. I'm not sure what to do. I couldn't fill the prescription. And I don't know if my office visits with him is going to be covered. I hope the insurance is covered by the rest of you I'm just not sure if it's just him that doesn't work with my insurance or is it all of you? I'm worried. Please call me back at [phone] thank you.  Have a good day

## 2022-07-22 NOTE — TELEPHONE ENCOUNTER
Marcela Selby,     We apologize that you're having issues with your insurance. Our office does accept your insurance and don't know why you're receiving bills. You may contact our billing office, [phone]. As for the medication, contact your insurance. Bebeto Dyer has graduated and that maybe the issue but I would contact your insurance to see.

## 2022-08-23 RX ORDER — MAGNESIUM OXIDE 400 MG/1
400 TABLET ORAL DAILY
Qty: 30 TABLET | Refills: 3 | Status: SHIPPED | OUTPATIENT
Start: 2022-08-23 | End: 2022-10-06 | Stop reason: SDUPTHER

## 2022-08-23 RX ORDER — AMITRIPTYLINE HYDROCHLORIDE 10 MG/1
10 TABLET, FILM COATED ORAL NIGHTLY
Qty: 30 TABLET | Refills: 2 | Status: SHIPPED | OUTPATIENT
Start: 2022-08-23

## 2022-08-23 NOTE — TELEPHONE ENCOUNTER
Patient  requesting refill for magnesium and amitriptyline. Please review and e-scribe if applicable.      Last Visit Date: 6.20.2022    Next Visit Date:  Future Appointments   Date Time Provider Scot Mcdaniels   10/6/2022  3:00 PM Kisha Rinaldi MD Neuro [facility]   11/4/2022 10:30 AM [facility] MRI RM 1 (1.5T) [facility]   11/14/2022  2:00 PM Refugio Corbett MD Neuro [facility]

## 2022-10-06 ENCOUNTER — OFFICE VISIT (OUTPATIENT)
Dept: NEUROLOGY | Age: 47
End: 2022-10-06
Payer: MEDICAID

## 2022-10-06 VITALS
DIASTOLIC BLOOD PRESSURE: 81 MMHG | BODY MASS INDEX: 29.16 KG/M2 | SYSTOLIC BLOOD PRESSURE: 118 MMHG | WEIGHT: 175 LBS | HEART RATE: 83 BPM | HEIGHT: 65 IN | OXYGEN SATURATION: 96 %

## 2022-10-06 DIAGNOSIS — G43.109 MIGRAINE WITH AURA AND WITHOUT STATUS MIGRAINOSUS, NOT INTRACTABLE: Primary | ICD-10-CM

## 2022-10-06 PROCEDURE — 99214 OFFICE O/P EST MOD 30 MIN: CPT

## 2022-10-06 RX ORDER — MAGNESIUM OXIDE 400 MG/1
400 TABLET ORAL DAILY
Qty: 30 TABLET | Refills: 3 | Status: SHIPPED | OUTPATIENT
Start: 2022-10-06

## 2022-10-06 NOTE — PATIENT INSTRUCTIONS
Maintain headache journal and bring with you for next appointment. Take magnesium every day. These are the list of names of different possible medications to prevent migraines: Elavil, topamax, propranolol, elavil, verapamil, effexor (must take daily). Drink enough water, get 7-9 hours of sleep daily, exercise regularly. Follow up appointment in 3 months.

## 2022-10-06 NOTE — PROGRESS NOTES
[facility] NEURO [facility] [address] [facility] [address]  Dept: [phone]    PATIENT NAME: Ana Luisa Padilla  PATIENT MRN: [id number]  PRIMARY CARE PHYSICIAN: Giacomo Noonan MD    This is a delayed entry note and reflect's the patient's condition and management plan at time of service. HPI:      Ana Luisa Padilla is a 41 y.o. female who presents to clinic today for follow up of Migraine (Migraine without aura and without status migrainosus, not intractable)  Since last visit patient's headaches have gotten better and she believes the Magnesium is helping. Reports she never tried the Elavil prescribed at last visit due to fear of a/e. Admits she has not been entirely compliant with medications due to forgetting to take them on some days. She has not yet started HA journal.  For hx of cerebral aneurysm she is following Dr Yue Norris and reports she is scheduled for MRA next month. Not currently having a headache and never tried the Elavil prescribed during last visit. Patient feels she is very sensitive to medications and is worried about side effects of different medications as well consequences if she forgets to take a pill one day. Lengthy discussion held with patient about different medication options for migraines, their benefits, and common side effects. Patient requested to have list of medication options so she can read about them further. Patient also reports difficulties with sleeping. PMHx: Left 2.67 mm DIPTI A1 segment aneurysm (follows with Dr. Yue Norris) diagnosed on DSA angio, Right external iliac artery s/p angio, COVID19 in December 2021, hypothyroidism, J shaped negative scotoma associated with headaches    Headache Hx:  Onset: adulthood, around 30s or 40s, cannot pinpoint start.    Character: throbbing or electric like, bilateral, diffuse, sometimes frontal radiating to occipital or at temples  Duration: seconds to hours  Frequency: mild HA daily, severe HA about once per week, ~1 pain free day per month  Associated symptoms: photophobia, sometimes nausea, floaters, J shaped scotoma  Aura: pt states sometimes she has aura and sometimes she does not; describes it as small progressive pulsations  Worsening factors: stress, weather  Improving factors: hot bath, dark room, relaxing, OTC medication  Triggers: not yet identified, has not started HA jounral. Medication averse. FMHx: her child also has migraines    Prior HA medications:  Abortive: good response to OTC including ibuprofen, alleve, or aspirin. Takes 1 time per week or less. Preventive: Failed topamax due to adverse effect. Has had improvement with magnesium. Has been prescribed Elavail but never tried due to pt afraid of possible adverse effects. PREVIOUS WORKUP:     - Reviewed results of prior labs and imaging. Current Outpatient Medications   Medication Sig Dispense Refill    magnesium oxide (MAG-OX) 400 MG tablet Take 1 tablet by mouth daily 30 tablet 3    levothyroxine (SYNTHROID) 125 MCG tablet       Multiple Vitamins-Minerals (THERAPEUTIC MULTIVITAMIN-MINERALS) tablet Take 1 tablet by mouth daily       amitriptyline (ELAVIL) 10 MG tablet Take 1 tablet by mouth nightly 30 tablet 2    aspirin 81 MG chewable tablet Take 1 tablet by mouth daily 30 tablet 3    methocarbamol (ROBAXIN) 750 MG tablet methocarbamol 750 mg tablet (Patient not taking: Reported on 1/14/2022)      ibuprofen (ADVIL;MOTRIN) 800 MG tablet ibuprofen 800 mg tablet   Take 1 tablet 3 times a day by oral route as directed for 7 days. (Patient not taking: Reported on 1/14/2022)      pantoprazole (PROTONIX) 20 MG tablet Take 2 tablets by mouth daily (Patient not taking: Reported on 1/13/2022) 30 tablet 0    levothyroxine (SYNTHROID) 200 MCG tablet Take 0.5 tablets by mouth Daily. (Patient not taking: Reported on 6/20/2022) 30 tablet 3     No current facility-administered medications for this visit.         REVIEW OF SYSTEMS:     Review of Systems   Eyes:  Positive for photophobia and visual disturbance. Neurological:  Positive for headaches. Negative for syncope, speech difficulty, weakness and numbness. Psychiatric/Behavioral:  Positive for sleep disturbance. Negative for confusion. NEUROLOGICAL EXAMINATION:   VITALS  /81   Pulse 83   Ht 5' 5\" (1.651 m)   Wt 175 lb (79.4 kg)   SpO2 96%   BMI 29.12 kg/m²     Mental status    Alert and oriented x 3; intact memory with no confusion, speech or language problems; no hallucinations or delusions     Cranial nerves    II - visual fields intact to confrontation  III, IV, VI - extra-ocular muscles full: no pupillary defect; no LILLIE, no nystagmus, no ptosis   V - normal facial sensation                                                               VII - normal facial symmetry                                                             VIII - intact hearing                                                                             IX, X - symmetrical palate                                                                  XI - symmetrical shoulder shrug                                                       XII - tongue midline without atrophy or fasciculation      Motor function  Normal muscle bulk and tone; strength 5/5 on all 4 extremities, no pronator drift      Sensory function Intact to light touch on all 4 extremities      Cerebellar Intact fine motor movement. No involuntary movements or tremors. No ataxia or dysmetria on finger to nose testing      Reflex function DTR present and symmetric on bilateral UE and LE.       Gait                   normal base and arm swing        ASSESSMENT / PLAN:   Devyn Schwab is a 41 y.o. female that presents to neurology clinic for follow-up of headaches that started within past 5-10 years, last seconds to hours, are bilateral and with variable descriptions of pain as above, with associated photophobia, nausea, and visual disturbances. Patient has been taking magnesium prophylactically with relief and headaches have improved since last appointment. Patient reports sensitivity to medications and is hesitant to take recommended Elavil. 1. Migraine with aura and without status migrainosus, not intractable   Recommend Elavil 10 mg Q HS for headache prophylaxis and could potentially help with sleeping issues as well - lengthy discussion held with pt regarding pros and cons; pt agrees to consider taking it  Continue magnesium oxide 400 mg po qd for headache prophylaxis   Importance of medication compliance and allowing ample time for prophylactic HA medication to take effect discussed with patient  Continue with OTC NSAID medications for abortive HA therapy  Start HA journal   Lifestyle changes to help with HA discussed      Patient instructions:  Maintain headache journal and bring with you for next appointment. Take magnesium every day. These are the list of names of different possible medications to prevent migraines: Elavil, topamax, propranolol, elavil, verapamil, effexor (must take daily). Drink enough water, get 7-9 hours of sleep daily, exercise regularly. Follow up appointment in 3 months. - I have independently reviewed all pertinent labs, imaging, reports, and consultant notes. - I have discussed the diagnosis, prognosis, risks, and goals of therapy, when appropriate, with patient and answered all of the patient's questions/concerns.     Electronically signed by Jeff Roberts DO on 10/06/2022 at 9:04 PM  Neurology PGY2 Resident  [facility]

## 2022-10-17 ASSESSMENT — ENCOUNTER SYMPTOMS: PHOTOPHOBIA: 1

## 2022-11-14 ENCOUNTER — TELEPHONE (OUTPATIENT)
Dept: NEUROSURGERY | Age: 47
End: 2022-11-14

## 2022-11-14 NOTE — TELEPHONE ENCOUNTER
Patient concerned about receiving a bill for a mistake made by a fellow in her leg during a procedure. Patient would like to know why she was billed for a mistake. Informed patient to contact billing but patient refused. Informed patient a request for an explanation will be sent to the team. Phone call ended abruptly.

## 2022-12-30 ENCOUNTER — TELEPHONE (OUTPATIENT)
Dept: NEUROLOGY | Age: 47
End: 2022-12-30

## 2023-01-04 ENCOUNTER — TELEPHONE (OUTPATIENT)
Dept: NEUROLOGY | Age: 48
End: 2023-01-04

## 2023-01-04 NOTE — TELEPHONE ENCOUNTER
Writer spoke to patient to remind her of a MRA that is needed prior to her next appointment. Patient stated she just had one done so she expressed concerns about having another test done. Patient next appointment 1.24.23. Patient requesting call back from provider.

## 2023-01-09 ENCOUNTER — OFFICE VISIT (OUTPATIENT)
Dept: PRIMARY CARE CLINIC | Age: 48
End: 2023-01-09

## 2023-01-09 VITALS
TEMPERATURE: 99.3 F | SYSTOLIC BLOOD PRESSURE: 118 MMHG | OXYGEN SATURATION: 99 % | DIASTOLIC BLOOD PRESSURE: 79 MMHG | HEART RATE: 94 BPM
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
## 2023-01-09 RX ORDER — AMOXICILLIN AND CLAVULANATE POTASSIUM 875; 125 MG/1; MG/1
1 TABLET, FILM COATED ORAL 2 TIMES DAILY
Qty: 20 TABLET | Refills: 0 | Status: SHIPPED | OUTPATIENT
Start: 2023-01-09 | End: 2023-01-19

## 2023-01-09 RX ORDER — MAGNESIUM OXIDE TAB 400 MG (241.3 MG ELEMENTAL MG) 400 (241.3 MG) MG
TAB ORAL
COMMUNITY
Start: 2023-01-02

## 2023-01-09 RX ORDER — LEVOTHYROXINE SODIUM 137 UG/1
TABLET ORAL
COMMUNITY
Start: 2023-01-02

## 2023-01-09 ASSESSMENT — ENCOUNTER SYMPTOMS
GASTROINTESTINAL NEGATIVE: 1
RESPIRATORY NEGATIVE: 1

## 2023-01-09 NOTE — PROGRESS NOTES
[address]  [address] [address]  Phone: [phone]  Fax: [phone]       [facility] Name: Angelic Paul  MRN: [id number]  P[place] 1975  Date of evaluation: 1/9/2023  Provider: Milena Olea PA-C     CHIEF COMPLAINT       Chief Complaint   Patient presents with    Animal Bite     Dog bite on both hands x today           HISTORY OF PRESENT ILLNESS  (Location/Symptom, Timing/Onset, Context/Setting, Quality, Duration, Modifying Factors, Severity.)   Angelic Paul is a 41 y.o.  / Richarda Jameel [10] female who presents to the office for evaluation of      Patients puppy ran out of the house while their children were boarding the bus. Bus ended up running over the puppy. Patient picked up the dog and dog began to bite the patient out of fear and panic. Patient was UTD on tetanus and states puppy was as well. Animal Bite   The incident occurred just prior to arrival. The incident occurred at home. There is an injury to the Right wrist, left wrist, right hand and left hand. The pain is moderate. It is unlikely that a foreign body is present. Her tetanus status is UTD. Nursing Notes were reviewed. REVIEW OF SYSTEMS    (2-9 systems for level 4, 10 or more for level 5)     Review of Systems   Constitutional:  Negative for chills, diaphoresis, fatigue, fever and unexpected weight change. HENT: Negative. Respiratory: Negative. Cardiovascular: Negative. Gastrointestinal: Negative. Genitourinary: Negative. Musculoskeletal: Negative. Skin:  Positive for wound. Dog bites to bilateral hands        Except as noted above the remainder of the review of systems was reviewed andnegative. PAST MEDICAL HISTORY   History reviewed. Past Medical History:   Diagnosis Date    Heart palpitations     Hypothyroidism 2008    s/p radioiodine ablation          SURGICAL HISTORY     History reviewed.     Past Surgical History:   Procedure Laterality Date    BREAST ENHANCEMENT SURGERY      x3     SECTION      failure to progress    DILATION AND CURETTAGE OF UTERUS      s/p SAB x 3    HAND SURGERY Right     CRPP R fifth [facility]    UMBILICAL HERNIA REPAIR N/A 1572    HERNIA UMBILICAL REPAIR W/MESH performed by Omaira Hyde MD at [facility]       Current Outpatient Medications   Medication Sig Dispense Refill    MAGNESIUM-OXIDE 400 (240 Mg) MG tablet       levothyroxine (SYNTHROID) 137 MCG tablet       amoxicillin-clavulanate (AUGMENTIN) 875-125 MG per tablet Take 1 tablet by mouth 2 times daily for 10 days 20 tablet 0    mupirocin (BACTROBAN) 2 % ointment Apply topically 3 times daily. 1 each 0    Multiple Vitamins-Minerals (THERAPEUTIC MULTIVITAMIN-MINERALS) tablet Take 1 tablet by mouth daily       magnesium oxide (MAG-OX) 400 MG tablet Take 1 tablet by mouth daily 30 tablet 3    levothyroxine (SYNTHROID) 125 MCG tablet  (Patient not taking: Reported on 2023)       No current facility-administered medications for this visit. ALLERGIES     Bee venom and Percocet [oxycodone-acetaminophen]    FAMILY HISTORY           Problem Relation Age of Onset    Diabetes Other      Family Status   Relation Name Status    Other  (Not Specified)          SOCIAL HISTORY      reports that she has quit smoking. Her smoking use included cigarettes. She has never used smokeless tobacco. She reports that she does not drink alcohol and does not use drugs. PHYSICAL EXAM    (up to 7 for level 4, 8 or more for level 5)     Vitals:    23 0915   BP: 118/79   Pulse: 94   Temp: 99.3 °F (37.4 °C)   TempSrc: Tympanic   SpO2: 99%         Physical Exam  Vitals and nursing note reviewed. Constitutional:       General: She is not in acute distress. Appearance: Normal appearance. She is not ill-appearing. HENT:      Head: Normocephalic and atraumatic.       Right Ear: External ear normal.      Left Ear: External ear normal.      Nose: Nose normal.      Mouth/Throat:      Mouth: Mucous membranes are moist.   Eyes:      Extraocular Movements: Extraocular movements intact. Conjunctiva/sclera: Conjunctivae normal.      Pupils: Pupils are equal, round, and reactive to light. Pulmonary:      Effort: Pulmonary effort is normal.   Abdominal:      Palpations: Abdomen is soft. Musculoskeletal:      Right wrist: Swelling and tenderness present. No bony tenderness. Normal range of motion. Left wrist: Swelling and tenderness present. No bony tenderness. Hands:       Comments: Multiple small dog bites to bilateral hands and wrist   Skin:     General: Skin is warm and dry. Neurological:      Mental Status: She is alert and oriented to person, place, and time. DIFFERENTIAL DIAGNOSIS:       Eugene Dorantes reviewed the disposition diagnosis with the patient and or their family/guardian. I have answered their questions and given discharge instructions. They voiced understanding of these instructions and did not have anyfurther questions or complaints. PROCEDURES:  No orders of the defined types were placed in this encounter. No results found for this visit on 01/09/23. FINALIMPRESSION      Visit Diagnoses and Associated Orders       Dog bite, initial encounter    -  Primary         ORDERS WITHOUT AN ASSOCIATED DIAGNOSIS    MAGNESIUM-OXIDE 400 (240 Mg) MG tablet [[id number]]      levothyroxine (SYNTHROID) 137 MCG tablet [[id number]]      amoxicillin-clavulanate (AUGMENTIN) 875-125 MG per tablet [[id number]]      mupirocin (BACTROBAN) 2 % ointment [[id number]]                PLAN     Return if symptoms worsen or fail to improve.       DISCHARGEMEDICATIONS:  Orders Placed This Encounter   Medications    amoxicillin-clavulanate (AUGMENTIN) 875-125 MG per tablet     Sig: Take 1 tablet by mouth 2 times daily for 10 days     Dispense:  20 tablet     Refill:  0    mupirocin (BACTROBAN) 2 % ointment     Sig: Apply topically 3 times daily. Dispense:  1 each     Refill:  0         Plan:  Patient instructed to complete antibiotic prescription fully. Warm compresses TID for 15 minutes at a time. Tylenol/Motrin as needed for the discomfort/fever. Patient agreeable to treatment plan. Educational materials provided on AVS.  Follow up if symptoms do not improve/worsen. Patient instructed to return to the office if symptoms worsen, return, or have any other concerns. Patient understands and is agreeable.          Jeffrey Arce PA-C 1/9/2023 9:47 AM

## 2023-01-30 ENCOUNTER — OFFICE VISIT (OUTPATIENT)
Dept: NEUROLOGY | Age: 48
End: 2023-01-30
Payer: MEDICAID

## 2023-01-30 VITALS
WEIGHT: 175 LBS | SYSTOLIC BLOOD PRESSURE: 142 MMHG | DIASTOLIC BLOOD PRESSURE: 79 MMHG | BODY MASS INDEX: 29.12 KG/M2 | OXYGEN SATURATION: 98 % | TEMPERATURE: 98.9 F | HEART RATE: 82 BPM | RESPIRATION RATE: 18 BRPM

## 2023-01-30 DIAGNOSIS — I67.1 CEREBRAL ANEURYSM: Primary | ICD-10-CM

## 2023-01-30 PROCEDURE — 99215 OFFICE O/P EST HI 40 MIN: CPT | Performed by: PSYCHIATRY & NEUROLOGY

## 2023-01-30 RX ORDER — PROPRANOLOL HCL 60 MG
60 CAPSULE, EXTENDED RELEASE 24HR ORAL 2 TIMES DAILY
Qty: 60 CAPSULE | Refills: 11 | Status: SHIPPED | OUTPATIENT
Start: 2023-01-30

## 2023-01-30 NOTE — PROGRESS NOTES
Neurocritical Care, Stroke & Neurointerventional Note    [address]   [address] [place] [address],  [address] , [address] [place] [address]   P: [phone]  Endovascular Neurosurgery Consult    Pt Name: Marcia Heimlich  MRN: [id number]  [place]: 1975  Date of evaluation: 2023  Primary Care Physician: Yaz Groves MD    Reason for evaluation: Cerebral small aneurysm < 3 mm     SUBJECTIVE:   History of Chief Complaint:    Marcia Heimlich is a 57 y.o. 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 Medications  Prior to Admission medications    Medication Sig Start Date End Date Taking? Authorizing Provider   MAGNESIUM-OXIDE 400 (240 Mg) MG tablet  23  Yes Historical Provider, MD   levothyroxine (SYNTHROID) 137 MCG tablet  23  Yes Historical Provider, MD   magnesium oxide (MAG-OX) 400 MG tablet Take 1 tablet by mouth daily 10/6/22  Yes Federico Crook, DO   Multiple Vitamins-Minerals (THERAPEUTIC MULTIVITAMIN-MINERALS) tablet Take 1 tablet by mouth daily    Yes Historical Provider, MD   levothyroxine (SYNTHROID) 125 MCG tablet  3/21/22   Historical Provider, MD    Scheduled Meds:  Continuous Infusions:  PRN Meds:.  Past Medical History   has a past medical history of Heart palpitations and Hypothyroidism. Past Surgical History   has a past surgical history that includes  section; Dilation and curettage of uterus; Breast enhancement surgery; Hand surgery (Right); and Umbilical hernia repair (N/A, 3/7/2019). Social History   reports that she has quit smoking. Her smoking use included cigarettes. She has never used smokeless tobacco.   reports no history of alcohol use. reports no history of drug use.   Family History  family history includes Diabetes in an other family member. Review of Systems:  CONSTITUTIONAL:  negative for fevers, chills, fatigue and malaise    EYES:  negative for double vision, blurred vision and photophobia     HEENT:  negative for tinnitus, epistaxis and sore throat    RESPIRATORY:  negative for cough, shortness of breath, wheezing    CARDIOVASCULAR:  negative for chest pain, palpitations, syncope, edema    GASTROINTESTINAL:  negative for nausea, vomiting    GENITOURINARY:  negative for incontinence    MUSCULOSKELETAL:  negative for neck or back pain    NEUROLOGICAL:  Negative for weakness and tingling  negative for headaches and dizziness    PSYCHIATRIC:  negative for anxiety      Review of systems otherwise negative. OBJECTIVE:   Vitals: BP (!) 142/79   Pulse 82   Temp 98.9 °F (37.2 °C)   Resp 18   Wt 175 lb (79.4 kg)   SpO2 98%   BMI 29.12 kg/m²   General appearance: Sitting in the chair NAD  HEENT: Head: Normocephalic, no lesions, without obvious abnormality. Neck: no adenopathy, no carotid bruit, no JVD, supple, symmetrical, trachea midline and thyroid not enlarged, symmetric, no tenderness/mass/nodules  Lungs: clear to auscultation bilaterally  Heart: regular rate and rhythm, S1, S2 normal, no murmur, click, rub or gallop  Abdomen: soft, non-tender; nondistended, bowel sounds normal  Extremities: extremities normal, atraumatic, no cyanosis or edema    Neurologic: Mental status: Alert, oriented, thought content appropriate, Alert and oriented x 3,   Language/speech: intact language, attention, knowledge  CN: II-XII intact  MOTOR: 5/5 throughout with normal tone and bulk for age  SENSORY: LT and PP symmetrical and intact  COORDINATION: F to N and Gait intact for age    LABS:   No results for input(s): WBC, HGB, HCT, PLT, NA, K, CL, CO2, BUN, CREATININE, MG, PHOS, CALCIUM, PTT, INR, AST, ALT, BILITOT, BILIDIR, NITRU, COLORU, BACTERIA in the last 72 hours.     Invalid input(s): PT, WBCU, RBCU, LEUKOCYTESUA  No results for input(s): ALKPHOS, ALT, AST, BILITOT, BILIDIR, LABALBU, AMYLASE, LIPASE in the last 72 hours. 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 PLANS:   MRA head  RTC in 6-8 months  ASA only   Inderal   Elavil    Consultation Visit Time:  40 minutes  Patient given educational materials - see patient instructions. Discussed use, benefit, and side effects of prescribed medications. Personally reviewed imaging with patients and all questions answered. Pt voiced understanding. Patient agreed with treatment plan. Follow up as directed below. Greater than 50% of the time was for counseling and providing answer to the patient question. The findings and the plan discussed with the patient and all her questions were answered. Thank you very much for your referral, please do not hesitate to contact me with any questions.     Rakan Christine MD Pager: [phone]  [facility] Storm MD  Pager [phone]  Electronically signed 2023 at 1:55 PM

## 2023-03-17 ENCOUNTER — TELEPHONE (OUTPATIENT)
Dept: NEUROLOGY | Age: 48
End: 2023-03-17

## 2023-03-20 ENCOUNTER — TELEPHONE (OUTPATIENT)
Dept: NEUROLOGY | Age: 48
End: 2023-03-20

## 2023-03-20 NOTE — TELEPHONE ENCOUNTER
An MRA head without contrast is needed to monitor the unruptured aneurysm in the anterior communicating artery. This monitoring is critical, if the aneurysm grew bigger it will warrant potential treatment as the likelihood of it rupturing also increases.

## 2023-04-06 ENCOUNTER — OFFICE VISIT (OUTPATIENT)
Dept: PRIMARY CARE CLINIC | Age: 48
End: 2023-04-06

## 2023-04-06 VITALS
DIASTOLIC BLOOD PRESSURE: 62 MMHG | TEMPERATURE: 98.6 F | HEART RATE: 70 BPM | BODY MASS INDEX: 29.12 KG/M2 | WEIGHT: 175 LBS | OXYGEN SATURATION: 98 % | SYSTOLIC BLOOD PRESSURE: 101 MMHG

## 2023-04-06 DIAGNOSIS — R22.0 LIP SWELLING: Primary | ICD-10-CM

## 2023-04-06 RX ORDER — PREDNISONE 20 MG/1
40 TABLET ORAL DAILY
Qty: 10 TABLET | Refills: 0 | Status: SHIPPED | OUTPATIENT
Start: 2023-04-06 | End: 2023-04-11

## 2023-04-06 RX ORDER — TRIAMCINOLONE ACETONIDE 40 MG/ML
40 INJECTION, SUSPENSION INTRA-ARTICULAR; INTRAMUSCULAR ONCE
Status: COMPLETED | OUTPATIENT
Start: 2023-04-06 | End: 2023-04-06

## 2023-04-06 RX ADMIN — TRIAMCINOLONE ACETONIDE 40 MG: 40 INJECTION, SUSPENSION INTRA-ARTICULAR; INTRAMUSCULAR at 16:55

## 2023-04-06 ASSESSMENT — ENCOUNTER SYMPTOMS
COUGH: 0
SINUS PRESSURE: 0
DIFFICULTY BREATHING: 0
FACIAL SWELLING: 1
HYPERVENTILATION: 0
TROUBLE SWALLOWING: 0
EYE WATERING: 0
CHEST TIGHTNESS: 0
EYE DISCHARGE: 0
WHEEZING: 0
EYE REDNESS: 0
VOMITING: 0
ALLERGIC REACTION: 1
SHORTNESS OF BREATH: 0
EYE ITCHING: 0
DIARRHEA: 0
VOICE CHANGE: 0
SORE THROAT: 0

## 2023-04-06 NOTE — PROGRESS NOTES
rash.   Neurological:      Mental Status: She is alert. /62   Pulse 70   Temp 98.6 °F (37 °C)   Wt 175 lb (79.4 kg)   SpO2 98%   BMI 29.12 kg/m²     Assessment:       Diagnosis Orders   1. Lip swelling  triamcinolone acetonide (KENALOG-40) injection 40 mg    predniSONE (DELTASONE) 20 MG tablet        Plan:      Kenalog injection provided in the office to help enable symptom relief. Patient tolerated well. Prednisone sent to the pharmacy to help enable symptom relief. To start tomorrow. Take with food. Benadryl as needed for itching/swelling. May apply ice to the affected area. The patient indicates understanding of these issues and agrees with the plan. Educational materials provided on AVS.  Follow up if symptoms do not improve/worsen. Discussed symptoms that will warrant urgent ED evaluation/treatment. Orders Placed This Encounter   Medications    triamcinolone acetonide (KENALOG-40) injection 40 mg    predniSONE (DELTASONE) 20 MG tablet     Sig: Take 2 tablets by mouth daily for 5 days     Dispense:  10 tablet     Refill:  0        Patient given educational materials - see patient instructions. Discussed use, benefit, and side effects of prescribed medications. All patientquestions answered. Pt voiced understanding.     Electronically signed by MARQUES Desai CNP on 4/6/2023at 5:10 PM

## 2023-05-07 ENCOUNTER — HOSPITAL ENCOUNTER (OUTPATIENT)
Age: 48
Setting detail: SPECIMEN
Discharge: HOME | End: 2023-05-07

## 2023-05-07 ENCOUNTER — OFFICE VISIT (OUTPATIENT)
Dept: PRIMARY CARE CLINIC | Age: 48
End: 2023-05-07

## 2023-05-07 VITALS
HEART RATE: 65 BPM | TEMPERATURE: 98.6 F | DIASTOLIC BLOOD PRESSURE: 78 MMHG | SYSTOLIC BLOOD PRESSURE: 114 MMHG | OXYGEN SATURATION: 96 %

## 2023-05-07 DIAGNOSIS — R31.9 HEMATURIA, UNSPECIFIED TYPE: ICD-10-CM

## 2023-05-07 DIAGNOSIS — N93.9 VAGINAL BLEEDING: ICD-10-CM

## 2023-05-07 DIAGNOSIS — N93.9 VAGINAL BLEEDING: Primary | ICD-10-CM

## 2023-05-07 LAB
BILIRUBIN, POC: ABNORMAL
BLOOD URINE, POC: ABNORMAL
CLARITY, POC: ABNORMAL
COLOR, POC: ABNORMAL
GLUCOSE URINE, POC: ABNORMAL
KETONES, POC: ABNORMAL
LEUKOCYTE EST, POC: ABNORMAL
NITRITE, POC: POSITIVE
PH, POC: 7
PROTEIN, POC: 100
SPECIFIC GRAVITY, POC: 1.02
UROBILINOGEN, POC: 1

## 2023-05-07 ASSESSMENT — ENCOUNTER SYMPTOMS
NAUSEA: 0
ABDOMINAL PAIN: 0
COUGH: 0
SINUS PAIN: 0
VOMITING: 0
SHORTNESS OF BREATH: 0
DIARRHEA: 0
SORE THROAT: 0

## 2023-05-08 ENCOUNTER — TELEPHONE (OUTPATIENT)
Dept: PRIMARY CARE CLINIC | Age: 48
End: 2023-05-08

## 2023-05-08 DIAGNOSIS — N93.9 VAGINAL BLEEDING: Primary | ICD-10-CM

## 2023-05-08 LAB
MICROORGANISM SPEC CULT: NORMAL
SPECIMEN DESCRIPTION: NORMAL

## 2023-05-08 NOTE — TELEPHONE ENCOUNTER
Pt called stating that she was seen yesterday for vaginal bleeding and needs a referral placed for OB/GYN Dr Fei Durant.

## 2023-05-27 DIAGNOSIS — R22.0 LIP SWELLING: ICD-10-CM

## 2023-05-28 RX ORDER — AMOXICILLIN AND CLAVULANATE POTASSIUM 875; 125 MG/1; MG/1
TABLET, FILM COATED ORAL
Qty: 20 TABLET | Refills: 0 | OUTPATIENT
Start: 2023-05-28

## 2023-05-30 RX ORDER — PREDNISONE 20 MG/1
40 TABLET ORAL DAILY
Qty: 10 TABLET | Refills: 0 | OUTPATIENT
Start: 2023-05-30 | End: 2023-06-04

## 2023-08-11 ENCOUNTER — OFFICE VISIT (OUTPATIENT)
Dept: NEUROLOGY | Age: 48
End: 2023-08-11
Payer: MEDICAID

## 2023-08-11 VITALS
HEART RATE: 70 BPM | SYSTOLIC BLOOD PRESSURE: 116 MMHG | TEMPERATURE: 98 F | BODY MASS INDEX: 29.12 KG/M2 | WEIGHT: 175 LBS | DIASTOLIC BLOOD PRESSURE: 74 MMHG | RESPIRATION RATE: 22 BRPM | OXYGEN SATURATION: 98 %

## 2023-08-11 DIAGNOSIS — I67.1 ANTERIOR CEREBRAL ANEURYSM: Primary | ICD-10-CM

## 2023-08-11 PROCEDURE — 99215 OFFICE O/P EST HI 40 MIN: CPT | Performed by: STUDENT IN AN ORGANIZED HEALTH CARE EDUCATION/TRAINING PROGRAM

## 2023-08-11 NOTE — PROGRESS NOTES
input(s): WBC, HGB, HCT, PLT, NA, K, CL, CO2, BUN, CREATININE, MG, PHOS, CALCIUM, PTT, INR, AST, ALT, BILITOT, BILIDIR, NITRU, COLORU, BACTERIA in the last 72 hours. Invalid input(s): PT, WBCU, RBCU, LEUKOCYTESUA  No results for input(s): ALKPHOS, ALT, AST, BILITOT, BILIDIR, LABALBU, AMYLASE, LIPASE in the last 72 hours. 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    Aneurysm too small for treatment, monitoring only for now      Impression: Patients with no history of SAH with aneurysms of ?7 mm are often followed conservatively with imaging surveillance to assess changes in size and/or morphology, which are known to predict rupture. The AHA/ASA guidelines recommend radiographic follow-up with MRA or CTA at regular intervals. However, the interval and duration of recommended follow-ups are uncertain. Interval:   Doing well. Anxious about the aneurysm. Not interested in DSA and prefers a noninvasive test (MRA). PLANS:   MRA head due now   RTC in 3 months  ASA only         Discussed with Dr Yoana Burleson.  Follow-up in 3 months    Mercedes Ibarra MD  Endovascular Neurosurgery Fellow     Stroke, [id number] [facility]  [facility]

## 2023-09-12 ENCOUNTER — HOSPITAL ENCOUNTER (OUTPATIENT)
Dept: MRI IMAGING | Age: 48
Discharge: HOME | End: 2023-09-14
Payer: MEDICAID

## 2023-09-12 DIAGNOSIS — I67.1 ANTERIOR CEREBRAL ANEURYSM: ICD-10-CM

## 2023-09-12 LAB
EGFR, POC: >60 ML/MIN/1.73M2
POC CREATININE: 0.4 MG/DL (ref 0.51–1.19)

## 2023-09-12 PROCEDURE — 2580000003 HC RX 258: Performed by: STUDENT IN AN ORGANIZED HEALTH CARE EDUCATION/TRAINING PROGRAM

## 2023-09-12 PROCEDURE — 82565 ASSAY OF CREATININE: CPT

## 2023-09-12 PROCEDURE — 6360000004 HC RX CONTRAST MEDICATION: Performed by: STUDENT IN AN ORGANIZED HEALTH CARE EDUCATION/TRAINING PROGRAM

## 2023-09-12 PROCEDURE — A9576 INJ PROHANCE MULTIPACK: HCPCS | Performed by: STUDENT IN AN ORGANIZED HEALTH CARE EDUCATION/TRAINING PROGRAM

## 2023-09-12 PROCEDURE — 70546 MR ANGIOGRAPH HEAD W/O&W/DYE: CPT

## 2023-09-12 RX ORDER — SODIUM CHLORIDE 0.9 % (FLUSH) 0.9 %
10 SYRINGE (ML) INJECTION PRN
Status: DISCONTINUED | OUTPATIENT
Start: 2023-09-12 | End: 2023-09-15 | Stop reason: HOSPADM

## 2023-09-12 RX ADMIN — SODIUM CHLORIDE, PRESERVATIVE FREE 10 ML: 5 INJECTION INTRAVENOUS at 15:46

## 2023-09-12 RX ADMIN — GADOTERIDOL 14 ML: 279.3 INJECTION, SOLUTION INTRAVENOUS at 15:46

## 2024-02-19 RX ORDER — PROPRANOLOL HCL 60 MG
CAPSULE, EXTENDED RELEASE 24HR ORAL
Qty: 60 CAPSULE | Refills: 11 | OUTPATIENT
Start: 2024-02-19

## 2024-02-19 NOTE — TELEPHONE ENCOUNTER
E-scribe requesting refill for Propranolol. Please review and e-scribe if applicable.     Last Visit Date: 08/11/2023    Next Visit Date:  Future Appointments   Date Time Provider Department Center   4/8/2024  3:30 PM Opal Shah MD Neuro Endo [facility]

## 2024-02-20 RX ORDER — PROPRANOLOL HCL 60 MG
60 CAPSULE, EXTENDED RELEASE 24HR ORAL 2 TIMES DAILY
Qty: 60 CAPSULE | Refills: 11 | Status: SHIPPED | OUTPATIENT
Start: 2024-02-20

## 2024-07-01 ENCOUNTER — OFFICE VISIT (OUTPATIENT)
Dept: NEUROLOGY | Age: 49
End: 2024-07-01
Payer: MEDICAID

## 2024-07-01 VITALS
DIASTOLIC BLOOD PRESSURE: 77 MMHG | WEIGHT: 183 LBS | OXYGEN SATURATION: 96 % | SYSTOLIC BLOOD PRESSURE: 115 MMHG | HEART RATE: 66 BPM | HEIGHT: 65 IN | BODY MASS INDEX: 30.49 KG/M2

## 2024-07-01 DIAGNOSIS — I77.72 ILIAC DISSECTION (HCC): Primary | ICD-10-CM

## 2024-07-01 DIAGNOSIS — I67.1 CEREBRAL ANEURYSM: ICD-10-CM

## 2024-07-01 PROCEDURE — G8419 CALC BMI OUT NRM PARAM NOF/U: HCPCS | Performed by: PSYCHIATRY & NEUROLOGY

## 2024-07-01 PROCEDURE — 99215 OFFICE O/P EST HI 40 MIN: CPT | Performed by: PSYCHIATRY & NEUROLOGY

## 2024-07-01 PROCEDURE — G8427 DOCREV CUR MEDS BY ELIG CLIN: HCPCS | Performed by: PSYCHIATRY & NEUROLOGY

## 2024-07-01 PROCEDURE — 1036F TOBACCO NON-USER: CPT | Performed by: PSYCHIATRY & NEUROLOGY

## 2024-07-01 RX ORDER — ASPIRIN 81 MG/1
81 TABLET, CHEWABLE ORAL DAILY
COMMUNITY

## 2024-07-01 NOTE — PROGRESS NOTES
Neurocritical Care, Stroke & Neurointerventional Note    [facility]   [address]   [place] [address]   P: [phone]  Endovascular Neurosurgery Clinic    Pt Name: Shonda Kelsey  MRN: [id number]  YOB: 1975  Date of evaluation: 2024  Primary Care Physician: Junior Schwab MD    Reason for evaluation: Cerebral small aneurysm < 3 mm     SUBJECTIVE:   History of Chief Complaint:    Shonda Kelsey is a 49 y.o. 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 Anxious about the aneurysm. Not interested in DSA and prefers a noninvasive test (MRA).       Allergies  is allergic to bee venom, codeine, and percocet [oxycodone-acetaminophen].  Medications  Prior to Admission medications    Medication Sig Start Date End Date Taking? Authorizing Provider   aspirin 81 MG chewable tablet Take 1 tablet by mouth daily   Yes Zach Leyva MD   propranolol (INDERAL LA) 60 MG extended release capsule Take 1 capsule by mouth in the morning and at bedtime 24  Yes Frederic Wood MD   MAGNESIUM-OXIDE 400 (240 Mg) MG tablet  23  Yes Zach Leyva MD   levothyroxine (SYNTHROID) 137 MCG tablet  23  Yes Zach Leyva MD   Multiple Vitamins-Minerals (THERAPEUTIC MULTIVITAMIN-MINERALS) tablet Take 1 tablet by mouth daily   Yes Zach Leyva MD    Scheduled Meds:  Continuous Infusions:  PRN Meds:.  Past Medical History   has a past medical history of Heart palpitations and Hypothyroidism.  Past Surgical History   has a past surgical history that includes  section; Dilation and curettage of uterus; Breast enhancement surgery; Hand surgery (Right); and Umbilical hernia repair (N/A, 3/7/2019).  Social History   reports

## 2024-07-19 ENCOUNTER — HOSPITAL ENCOUNTER (OUTPATIENT)
Dept: MRI IMAGING | Age: 49
End: 2024-07-19
Attending: PSYCHIATRY & NEUROLOGY
Payer: MEDICAID

## 2024-07-19 ENCOUNTER — HOSPITAL ENCOUNTER (OUTPATIENT)
Dept: CT IMAGING | Age: 49
End: 2024-07-19
Attending: PSYCHIATRY & NEUROLOGY
Payer: MEDICAID

## 2024-07-19 DIAGNOSIS — I67.1 CEREBRAL ANEURYSM: ICD-10-CM

## 2024-07-19 DIAGNOSIS — I10 HYPERTENSION, UNSPECIFIED TYPE: Primary | ICD-10-CM

## 2024-07-19 DIAGNOSIS — I77.72 ILIAC DISSECTION (HCC): ICD-10-CM

## 2024-07-19 LAB
EGFR, POC: >90 ML/MIN/1.73M2
POC CREATININE: 0.5 MG/DL (ref 0.51–1.19)

## 2024-07-19 PROCEDURE — 75635 CT ANGIO ABDOMINAL ARTERIES: CPT

## 2024-07-19 PROCEDURE — 82565 ASSAY OF CREATININE: CPT

## 2024-07-19 PROCEDURE — 6360000004 HC RX CONTRAST MEDICATION: Performed by: PSYCHIATRY & NEUROLOGY

## 2024-07-19 PROCEDURE — 70544 MR ANGIOGRAPHY HEAD W/O DYE: CPT

## 2024-07-19 RX ADMIN — IOPAMIDOL 125 ML: 755 INJECTION, SOLUTION INTRAVENOUS at 14:08

## 2024-09-17 ENCOUNTER — OFFICE VISIT (OUTPATIENT)
Dept: PRIMARY CARE CLINIC | Age: 49
End: 2024-09-17
Payer: MEDICAID

## 2024-09-17 ENCOUNTER — TELEPHONE (OUTPATIENT)
Dept: PRIMARY CARE CLINIC | Age: 49
End: 2024-09-17

## 2024-09-17 VITALS
BODY MASS INDEX: 30.49 KG/M2 | HEIGHT: 65 IN | WEIGHT: 183 LBS | SYSTOLIC BLOOD PRESSURE: 108 MMHG | TEMPERATURE: 101.7 F | OXYGEN SATURATION: 97 % | HEART RATE: 86 BPM | DIASTOLIC BLOOD PRESSURE: 72 MMHG

## 2024-09-17 DIAGNOSIS — R50.9 FEVER, UNSPECIFIED FEVER CAUSE: ICD-10-CM

## 2024-09-17 DIAGNOSIS — U07.1 COVID-19: Primary | ICD-10-CM

## 2024-09-17 LAB
Lab: 1
PERFORMING INSTRUMENT: ABNORMAL
QC PASS/FAIL: ABNORMAL
SARS-COV-2, POC: DETECTED

## 2024-09-17 PROCEDURE — G8417 CALC BMI ABV UP PARAM F/U: HCPCS | Performed by: NURSE PRACTITIONER

## 2024-09-17 PROCEDURE — G8427 DOCREV CUR MEDS BY ELIG CLIN: HCPCS | Performed by: NURSE PRACTITIONER

## 2024-09-17 PROCEDURE — 99213 OFFICE O/P EST LOW 20 MIN: CPT | Performed by: NURSE PRACTITIONER

## 2024-09-17 PROCEDURE — 1036F TOBACCO NON-USER: CPT | Performed by: NURSE PRACTITIONER

## 2024-09-17 PROCEDURE — 87426 SARSCOV CORONAVIRUS AG IA: CPT | Performed by: NURSE PRACTITIONER

## 2024-09-17 RX ORDER — BENZONATATE 200 MG/1
200 CAPSULE ORAL 3 TIMES DAILY PRN
Qty: 20 CAPSULE | Refills: 0 | Status: SHIPPED | OUTPATIENT
Start: 2024-09-17 | End: 2024-09-24

## 2024-09-17 ASSESSMENT — ENCOUNTER SYMPTOMS
COUGH: 1
SHORTNESS OF BREATH: 0
SINUS PRESSURE: 0
WHEEZING: 0
CHEST TIGHTNESS: 0
EYE REDNESS: 0
VOICE CHANGE: 0
EYE DISCHARGE: 0
SORE THROAT: 0

## 2025-01-06 ENCOUNTER — TELEPHONE (OUTPATIENT)
Dept: NEUROSURGERY | Age: 50
End: 2025-01-06

## 2025-01-06 ENCOUNTER — OFFICE VISIT (OUTPATIENT)
Dept: NEUROLOGY | Age: 50
End: 2025-01-06
Payer: COMMERCIAL

## 2025-01-06 VITALS
WEIGHT: 183 LBS | SYSTOLIC BLOOD PRESSURE: 116 MMHG | BODY MASS INDEX: 30.49 KG/M2 | HEART RATE: 69 BPM | HEIGHT: 65 IN | DIASTOLIC BLOOD PRESSURE: 80 MMHG

## 2025-01-06 DIAGNOSIS — I67.1 ANTERIOR CEREBRAL ANEURYSM: Primary | ICD-10-CM

## 2025-01-06 PROCEDURE — 99215 OFFICE O/P EST HI 40 MIN: CPT | Performed by: PSYCHIATRY & NEUROLOGY

## 2025-01-06 NOTE — PROGRESS NOTES
Neurocritical Care, Stroke & Neurointerventional Note    [facility]   [address]   [place] [address]   P: [phone]  Endovascular Neurosurgery Clinic    Pt Name: Shonda Kelsey  MRN: [id number]  YOB: 1975  Date of evaluation: 1/6/2025  Primary Care Physician: Junior Schwab MD    Reason for evaluation: Cerebral small aneurysm < 3 mm     SUBJECTIVE:   History of Chief Complaint:    Shonda Kelsey is a 49 y.o. 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 Anxious about the aneurysm. Not interested in DSA and prefers a noninvasive test (MRA).       Allergies  is allergic to bee venom, codeine, and percocet [oxycodone-acetaminophen].  Medications  Prior to Admission medications    Medication Sig Start Date End Date Taking? Authorizing Provider   propranolol (INDERAL LA) 60 MG extended release capsule Take 1 capsule by mouth in the morning and at bedtime  Patient taking differently: Take 1 capsule by mouth daily 2/20/24  Yes Frederic Wood MD   levothyroxine (SYNTHROID) 137 MCG tablet Take 1 tablet by mouth Daily 1/2/23  Yes Zach Leyva MD   aspirin 81 MG chewable tablet Take 1 tablet by mouth daily  Patient not taking: Reported on 1/6/2025    Zach Leyva MD   MAGNESIUM-OXIDE 400 (240 Mg) MG tablet  1/2/23   Zach Leyva MD   Multiple Vitamins-Minerals (THERAPEUTIC MULTIVITAMIN-MINERALS) tablet Take 1 tablet by mouth daily  Patient not taking: Reported on 1/6/2025    Zach Leyva MD    Scheduled Meds:  Continuous Infusions:  PRN Meds:.  Past Medical History   has a past medical history of Headache, Heart palpitations, Hypertension, and Hypothyroidism.  Past Surgical History   has a past surgical history

## 2025-01-06 NOTE — TELEPHONE ENCOUNTER
Patient called and was asking if she should take her full dose of medicine she cut down too or 1/2 of the dose like she went down too her self. The medication is called which I probably will spell it wrong. Is properianph.  Can you please call her and let her know.

## 2025-02-21 ENCOUNTER — PATIENT MESSAGE (OUTPATIENT)
Dept: NEUROLOGY | Age: 50
End: 2025-02-21

## 2025-02-24 RX ORDER — PROPRANOLOL HYDROCHLORIDE 60 MG/1
60 CAPSULE, EXTENDED RELEASE ORAL 2 TIMES DAILY
Qty: 60 CAPSULE | Refills: 11 | Status: CANCELLED | OUTPATIENT
Start: 2025-02-24

## 2025-02-25 RX ORDER — PROPRANOLOL HYDROCHLORIDE 60 MG/1
60 CAPSULE, EXTENDED RELEASE ORAL 2 TIMES DAILY
Qty: 60 CAPSULE | Refills: 11 | Status: CANCELLED | OUTPATIENT
Start: 2025-02-25

## 2025-02-25 NOTE — TELEPHONE ENCOUNTER
Hello I'm out of this and it said it's . I just had a visit with you and you told me to continue using it. Can I have refills please? Thank you so much. Have a good weekend Dr. ROSENTHAL!

## 2025-02-26 RX ORDER — MAGNESIUM OXIDE TAB 400 MG (241.3 MG ELEMENTAL MG) 400 (241.3 MG) MG
400 TAB ORAL DAILY
Qty: 30 TABLET | Refills: 11 | OUTPATIENT
Start: 2025-02-26

## 2025-02-28 RX ORDER — PROPRANOLOL HYDROCHLORIDE 60 MG/1
60 CAPSULE, EXTENDED RELEASE ORAL DAILY
Qty: 30 CAPSULE | Refills: 3 | Status: SHIPPED | OUTPATIENT
Start: 2025-02-28

## 2025-02-28 NOTE — TELEPHONE ENCOUNTER
Patient called back several times and expressed Dr. Shah had mentioned her to take this medication. No documentation in clinical notes, but will refill for now until pt has opportunity to discuss with Dr. Shah again at next f/up visit.

## 2025-03-14 ENCOUNTER — TELEPHONE (OUTPATIENT)
Dept: GASTROENTEROLOGY | Age: 50
End: 2025-03-14

## 2025-03-25 ENCOUNTER — OFFICE VISIT (OUTPATIENT)
Dept: GASTROENTEROLOGY | Age: 50
End: 2025-03-25
Payer: COMMERCIAL

## 2025-03-25 VITALS
BODY MASS INDEX: 30.95 KG/M2 | TEMPERATURE: 97.4 F | DIASTOLIC BLOOD PRESSURE: 58 MMHG | WEIGHT: 186 LBS | SYSTOLIC BLOOD PRESSURE: 103 MMHG

## 2025-03-25 DIAGNOSIS — D50.8 ANEMIA, IRON DEFICIENCY, INADEQUATE DIETARY INTAKE: Primary | ICD-10-CM

## 2025-03-25 DIAGNOSIS — I77.72 DISSECTION OF ILIAC ARTERY: ICD-10-CM

## 2025-03-25 DIAGNOSIS — N93.9 ABNORMAL UTERINE BLEEDING: ICD-10-CM

## 2025-03-25 DIAGNOSIS — K62.5 BRBPR (BRIGHT RED BLOOD PER RECTUM): ICD-10-CM

## 2025-03-25 PROCEDURE — 99204 OFFICE O/P NEW MOD 45 MIN: CPT | Performed by: INTERNAL MEDICINE

## 2025-03-25 ASSESSMENT — ENCOUNTER SYMPTOMS
ABDOMINAL DISTENTION: 0
VOMITING: 0
ANAL BLEEDING: 0
DIARRHEA: 0
COUGH: 0
RECTAL PAIN: 1
BLOOD IN STOOL: 1
NAUSEA: 1
ABDOMINAL PAIN: 0
CHOKING: 0
SHORTNESS OF BREATH: 0
WHEEZING: 0
SORE THROAT: 0
VOICE CHANGE: 0
CONSTIPATION: 0
TROUBLE SWALLOWING: 0

## 2025-03-25 NOTE — PROGRESS NOTES
was advised about avoidance of caffeine, nicotine and chocolate. Pt was also told to stay away from any kind of fast foods, soda pops. She was also advised to avoid lots of spices, grease and fried food etc.     Instructions were also given about trying to arrange the timing, quality and quantity of food.    Instructions were given about using ample amount of fiber including dietary and supplemental fiber either metamucil, bennafiber or citrucell etc.  Pt was advised about drinking ample amount of water without any colors or chemicals. Stress was given about regular exercise.    Pt has verbalized understanding and agreement to these modifications.    More than half of patient's clinic visit time was spent in counseling about lifestyle and dietary modifications  Patient's  questions were answered in this regard as well  The patient has verbalized understanding and agreement     Pt was given advices and instructions about weight loss. She was advised about the significance of exercise at least three times a week .   Dietary advices were also given about the avoidance of fast food, fried food and lots of spices and grease.     nstructions were given about using ample amount of fiber including dietary and supplemental fiber either metamucil, bennafiber or citrucell etc.  Pt was advised about drinking ample amount of water without any colors or chemicals. Stress was given about regular exercise.    Pt was told to stay way from soda pops.    Pt has verbalized understanding and agrrement      Thank you for allowing me to participate in the care of Ms. Kelsey. For any further questions please do not hesitate to contact me.    I have reviewed and agree with the ROS entered by the MA/Nurse.     This note is created with the assistance of the speech recognition program.  While intending to generate a document that actually reflects the content of the visit, document can still have some errors including those of syntax and

## 2025-03-27 ENCOUNTER — PREP FOR PROCEDURE (OUTPATIENT)
Dept: GASTROENTEROLOGY | Age: 50
End: 2025-03-27

## 2025-03-27 RX ORDER — POLYETHYLENE GLYCOL 3350 17 G/17G
POWDER, FOR SOLUTION ORAL
Qty: 238 G | Refills: 0 | Status: SHIPPED | OUTPATIENT
Start: 2025-03-27

## 2025-03-27 RX ORDER — BISACODYL 5 MG
TABLET, DELAYED RELEASE (ENTERIC COATED) ORAL
Qty: 4 TABLET | Refills: 0 | Status: SHIPPED | OUTPATIENT
Start: 2025-03-27

## 2025-03-27 NOTE — TELEPHONE ENCOUNTER
Procedure scheduled/Dr JT Emanuel  Procedure: colon egd   Dx:   1. Anemia, iron deficiency, inadequate dietary intake    2. Dissection of iliac artery    3. Abnormal uterine bleeding    4. BRBPR (bright red blood per rectum)      Date: 8/7/25   Time: 10 a.m.  Hospital: [facility]   Bowel Prep instructions given: Miralax dulco  In office/via phone: office   Clearance needed:yes  Neurology to Dr Shah  GLP - 1: N/A

## 2025-04-01 ENCOUNTER — APPOINTMENT (OUTPATIENT)
Dept: GENERAL RADIOLOGY | Age: 50
End: 2025-04-01
Payer: COMMERCIAL

## 2025-04-01 ENCOUNTER — HOSPITAL ENCOUNTER (EMERGENCY)
Age: 50
Discharge: HOME | End: 2025-04-02
Attending: EMERGENCY MEDICINE
Payer: COMMERCIAL

## 2025-04-01 VITALS
OXYGEN SATURATION: 98 % | HEART RATE: 66 BPM | DIASTOLIC BLOOD PRESSURE: 64 MMHG | RESPIRATION RATE: 18 BRPM | SYSTOLIC BLOOD PRESSURE: 103 MMHG | TEMPERATURE: 98.4 F

## 2025-04-01 DIAGNOSIS — S29.011A INTERCOSTAL MUSCLE STRAIN, INITIAL ENCOUNTER: Primary | ICD-10-CM

## 2025-04-01 LAB — TROPONIN I SERPL HS-MCNC: <6 NG/L (ref 0–14)

## 2025-04-01 PROCEDURE — 6370000000 HC RX 637 (ALT 250 FOR IP)

## 2025-04-01 PROCEDURE — 84484 ASSAY OF TROPONIN QUANT: CPT

## 2025-04-01 PROCEDURE — 93005 ELECTROCARDIOGRAM TRACING: CPT

## 2025-04-01 PROCEDURE — 71045 X-RAY EXAM CHEST 1 VIEW: CPT

## 2025-04-01 PROCEDURE — 99285 EMERGENCY DEPT VISIT HI MDM: CPT

## 2025-04-01 RX ORDER — GUAIFENESIN 600 MG/1
600 TABLET, EXTENDED RELEASE ORAL ONCE
Status: COMPLETED | OUTPATIENT
Start: 2025-04-01 | End: 2025-04-01

## 2025-04-01 RX ORDER — GUAIFENESIN 600 MG/1
600 TABLET, EXTENDED RELEASE ORAL 2 TIMES DAILY
Qty: 30 TABLET | Refills: 0 | Status: SHIPPED | OUTPATIENT
Start: 2025-04-01 | End: 2025-04-16

## 2025-04-01 RX ADMIN — GUAIFENESIN 600 MG: 600 TABLET, EXTENDED RELEASE ORAL at 22:21

## 2025-04-02 LAB
EKG ATRIAL RATE: 80 BPM
EKG P AXIS: 54 DEGREES
EKG P-R INTERVAL: 132 MS
EKG Q-T INTERVAL: 358 MS
EKG QRS DURATION: 78 MS
EKG QTC CALCULATION (BAZETT): 412 MS
EKG R AXIS: 38 DEGREES
EKG T AXIS: 22 DEGREES
EKG VENTRICULAR RATE: 80 BPM

## 2025-04-02 PROCEDURE — 93010 ELECTROCARDIOGRAM REPORT: CPT | Performed by: INTERNAL MEDICINE

## 2025-04-02 RX ORDER — AZITHROMYCIN 250 MG/1
TABLET, FILM COATED ORAL
Qty: 6 TABLET | Refills: 0 | Status: SHIPPED | OUTPATIENT
Start: 2025-04-02 | End: 2025-04-12

## 2025-04-02 NOTE — ED PROVIDER NOTES
[facility] EMERGENCY DEPARTMENT     Emergency Department     Faculty Attestation    I performed a history and physical examination of the patient and discussed management with the resident. I reviewed the resident’s note and agree with the documented findings and plan of care. Any areas of disagreement are noted on the chart. I was personally present for the key portions of any procedures. I have documented in the chart those procedures where I was not present during the key portions. I have reviewed the emergency nurses triage note. I agree with the chief complaint, past medical history, past surgical history, allergies, medications, social and family history as documented unless otherwise noted below. For Physician Assistant/ Nurse Practitioner cases/documentation I have personally evaluated this patient and have completed at least one if not all key elements of the E/M (history, physical exam, and MDM). Additional findings are as noted.    Note Started: 9:44 PM EDT    Patient here with anterior chest pain central chest after \"I sneezed really hard.\"  States lasted seconds but has recurred when she coughs or sneezes.  URI for several days cough nonproductive no hemoptysis.  No cardiac history no risk factors for PE.  On exam mild loss of voice but no dysphonia no drooling or stridor.  Lungs clear and equal.  Chest focal reproducible tenderness left upper costochondral junction no crepitus.  Heart regular.  No calf tenderness no edema.  Will check chest x-ray EKG troponin plan discharge    EKG interpretation: Sinus rhythm 80 normal intervals normal axis no acute ST or T changes no acute finding      Critical Care     none    Hector Chase MD, FACEP, FAAEM  Attending Emergency  Physician           Hectro Chase MD  04/01/25 3396

## 2025-04-02 NOTE — ED PROVIDER NOTES
[facility] EMERGENCY DEPARTMENT  Emergency Department Encounter  Emergency Medicine Resident     Pt Name:Shonda Kelsey  MRN: [id number]  Birthdate 1975  Date of evaluation: 25  PCP:  Junior Schwab MD  Note Started: 10:09 PM EDT      CHIEF COMPLAINT       Chief Complaint   Patient presents with    Chest Pain    Nasal Congestion    Generalized Body Aches       HISTORY OF PRESENT ILLNESS  (Location/Symptom, Timing/Onset, Context/Setting, Quality, Duration, Modifying Factors, Severity.)      Shonda Kelsey is a 50 y.o. female who presents with acute onset left-sided chest pain with sneeze that resolved immediately afterward but has recurred with occasional cough.  Patient denies any shortness of breath.  Denies numbness, tingling, weakness in her extremities.  Denies any abdominal pain or vomiting.  Patient does have a history of a brain aneurysm and is on propranolol for blood pressure but otherwise has no significant medical issues.  Patient has had stuffy nose and sore throat for the past couple days but denies any fever or chills.  She does work at a school.  No difficulty breathing or swallowing.  Has been eating and drinking normally.  Otherwise has no complaints.    PAST MEDICAL / SURGICAL / SOCIAL / FAMILY HISTORY      has a past medical history of Headache, Heart palpitations, Hypertension, and Hypothyroidism.       has a past surgical history that includes  section; Dilation and curettage of uterus; Breast enhancement surgery; Hand surgery (Right); and Umbilical hernia repair (N/A, 3/7/2019).      Social History     Socioeconomic History    Marital status: Single     Spouse name: Not on file    Number of children: Not on file    Years of education: Not on file    Highest education level: Not on file   Occupational History    Not on file   Tobacco Use    Smoking status: Former     Types: Cigarettes    Smokeless tobacco: Never    Tobacco comments:     I only would have 1-2  drugs.  Prescription drug management.        EKG  EKG Interpretation    Interpreted by emergency department physician    Rhythm: normal sinus   Rate: normal  Axis: normal  Ectopy: none  Conduction: normal  ST Segments: normal  T Waves: normal  Q Waves: none    Clinical Impression: no acute changes    Melvin Rose MD      All EKG's are interpreted by the Emergency Department Physician who either signs or Co-signs this chart in the absence of a cardiologist.    EMERGENCY DEPARTMENT COURSE:    ED Course as of 04/02/25 0042   Tue Apr 01, 2025   2354 Troponin:    Troponin, High Sensitivity <6 [DH]   Wed Apr 02, 2025   0028 Patient reassessed.  She says she is feeling a little better and has not had any pains in her chest since being here. [DH]   0030 Chest x-ray taken exorbitant amount of time to be read, however on my review does not appear to be any rib fractures, no pneumothorax, normal cardiac silhouette, no notable consolidations or evidence of patchy infiltrates..  Did discuss with patient that we could wait, however would reach out to her if there is anything abnormal on imaging.  She would like to go home as she has to work in the morning.  Will plan to discharge with guaifenesin and follow-up with her primary care provider.  Patient expressed verbal understanding is amenable with this plan [DH]   0042 X-ray resulted as patient was leaving the department.  Found to have possible atypical pneumonia and patient was started on a course of azithromycin to help with this. [DH]      ED Course User Index  [DH] Melvin Rose MD     FINAL IMPRESSION      1. Intercostal muscle strain, initial encounter          DISPOSITION / PLAN     DISPOSITION Decision To Discharge 04/02/2025 12:31:37 AM   DISPOSITION CONDITION Stable           PATIENT REFERRED TO:  Junior Schwab MD  [address] [facility] [facility] [address]  [phone]    Schedule an appointment as soon as possible for a visit in 1 week        DISCHARGE

## 2025-04-02 NOTE — ED PROVIDER NOTES
Faculty Sign-Out Attestation  Handoff taken on the following patient from prior Attending Physician: Rena  Note Started: 11:03 PM EDT    I was available and discussed any additional care issues that arose and coordinated the management plans with the resident(s) caring for the patient during my duty period. Any areas of disagreement with resident’s documentation of care or procedures are noted on the chart. I was personally present for the key portions of any/all procedures during my duty period. I have documented in the chart those procedures where I was not present during the key portions.    Cp, trop < 6, perc -,   Need cxr read & discharge  --pt not waiting for cxr / informed discharge,     Shahbaz Mason DO  Attending Physician       Shahbaz Mason DO  04/01/25 4416       Shahbaz Mason DO  04/02/25 0035

## 2025-04-02 NOTE — DISCHARGE INSTRUCTIONS
You were seen in the emergency department for chest discomfort.  Was most likely a muscle strain from sneezing and coughing.  You can treat this with over-the-counter pain medications as needed, however this should resolve over the next 2 to 3 weeks.  If you have any significant worsening of symptoms to include shortness of breath, persistent chest pain, numbness, tingling, weakness, please return to the emergency department for further evaluation otherwise you can follow-up with your primary care provider for ongoing management of your symptoms.    Additionally you are suffering from some nasal and sinus congestion.  You can use guaifenesin as prescribed for assistance with the symptoms.  
holding area

## 2025-04-02 NOTE — ED NOTES
Pt arrived via triage with c/o chest pain, gen body aches, and congestion.   Pt states she has been feeling \"sick\" for the past few days with cold like s/s.   Pt states today, she sneezed and immediately felt a sharp pain in the middle of her chest.   Pt states the pain cones and goes, denies pain on assessment.   Pt reports hx HTN and states she took all prescribed medications today.  Pt alert and oriented x4.  NAD noted on assessment.   Call light in reach  Cardiac monitor placed, EKG completed in triage, IV established.   Waiting further orders to plan of care

## 2025-04-02 NOTE — ED NOTES
The following labs were labeled with appropriate pt sticker and tubed to lab:     [x] Blue     [x] Lavender   [] on ice  [x] Green/yellow  [] Green/black [] on ice  [] Wood  [] on ice  [x] Yellow  [] Red  [] Pink  [] Type/ Screen  [] ABG  [] VBG    [] COVID-19 swab    [] Rapid  [] PCR  [] Flu swab  [] Peds Viral Panel     [] Urine Sample  [] Fecal Sample  [] Pelvic Cultures  [] Blood Cultures  [] X 2  [] STREP Cultures  [] Wound Cultures

## 2025-04-21 ENCOUNTER — OFFICE VISIT (OUTPATIENT)
Dept: PRIMARY CARE CLINIC | Age: 50
End: 2025-04-21
Payer: COMMERCIAL

## 2025-04-21 ENCOUNTER — RESULTS FOLLOW-UP (OUTPATIENT)
Dept: PRIMARY CARE CLINIC | Age: 50
End: 2025-04-21

## 2025-04-21 VITALS
SYSTOLIC BLOOD PRESSURE: 118 MMHG | TEMPERATURE: 97.7 F | OXYGEN SATURATION: 97 % | DIASTOLIC BLOOD PRESSURE: 77 MMHG | HEART RATE: 71 BPM

## 2025-04-21 DIAGNOSIS — R05.3 PERSISTENT COUGH FOR 3 WEEKS OR LONGER: ICD-10-CM

## 2025-04-21 DIAGNOSIS — J40 BRONCHITIS: Primary | ICD-10-CM

## 2025-04-21 PROCEDURE — 99213 OFFICE O/P EST LOW 20 MIN: CPT | Performed by: NURSE PRACTITIONER

## 2025-04-21 RX ORDER — ALBUTEROL SULFATE 90 UG/1
2 INHALANT RESPIRATORY (INHALATION) 4 TIMES DAILY PRN
Qty: 54 G | Refills: 1 | Status: CANCELLED | OUTPATIENT
Start: 2025-04-21

## 2025-04-21 RX ORDER — DOXYCYCLINE 100 MG/1
100 CAPSULE ORAL 2 TIMES DAILY
Qty: 20 CAPSULE | Refills: 0 | Status: SHIPPED | OUTPATIENT
Start: 2025-04-21 | End: 2025-05-01

## 2025-04-21 RX ORDER — DEXTROMETHORPHAN HYDROBROMIDE AND PROMETHAZINE HYDROCHLORIDE 15; 6.25 MG/5ML; MG/5ML
5 SYRUP ORAL 4 TIMES DAILY PRN
Qty: 150 ML | Refills: 0 | Status: SHIPPED | OUTPATIENT
Start: 2025-04-21 | End: 2025-04-28

## 2025-04-21 RX ORDER — GUAIFENESIN 400 MG/1
400 TABLET ORAL 4 TIMES DAILY PRN
COMMUNITY

## 2025-04-21 RX ORDER — PREDNISONE 20 MG/1
40 TABLET ORAL DAILY
Qty: 10 TABLET | Refills: 0 | Status: SHIPPED | OUTPATIENT
Start: 2025-04-21 | End: 2025-04-26

## 2025-04-21 ASSESSMENT — ENCOUNTER SYMPTOMS
SINUS PRESSURE: 0
CHEST TIGHTNESS: 0
WHEEZING: 1
RHINORRHEA: 1
SORE THROAT: 0
EYE DISCHARGE: 0
COUGH: 1
EYE REDNESS: 0
SHORTNESS OF BREATH: 1
VOICE CHANGE: 0

## 2025-04-21 NOTE — RESULT ENCOUNTER NOTE
Please let the patient know that the radiologist confirmed that she has left upper lobe pneumonia. Continue treatment as discussed in the office.

## 2025-04-21 NOTE — PROGRESS NOTES
[facility] PHYSICIANS [facility], [facility] IN CARE  [address]  [facility] [address]  Dept: [phone]     Shonda Kelsey is a 50 y.o. female who presents to the urgent care today for her medicalconditions/complaints as noted below.  Shonda Kelsey is c/o of Cough (Cough x three weeks was seen in Ed was prescribed abx  was feeling better cough never really went away. Pt believes this is more than allergies )    HPI:     Cough  This is a new problem. The current episode started 1 to 4 weeks ago. The problem has been waxing and waning (got better with zpak, then worsened again). The cough is Productive of purulent sputum. Associated symptoms include headaches, nasal congestion, postnasal drip, rhinorrhea, shortness of breath and wheezing. Pertinent negatives include no chest pain, chills, ear pain, eye redness, fever, myalgias, rash or sore throat. Treatments tried: otc tx, azithromycin. The treatment provided no relief. Her past medical history is significant for environmental allergies.     4/1 was seen for intercoastal  muscle strain in the ER. Was given mucinex and azithromycin for her symptoms. Has CXR at that time which reveals possible atypical or viral pneumonia.    Past Medical History:   Diagnosis Date    Headache     Heart palpitations     Hypertension 2022?    Not sure if it was consistent or if it's a thing i have,  at the time.  We're keeping an eye on it [place] if the aneurysm    Hypothyroidism 2008    s/p radioiodine ablation       Current Outpatient Medications   Medication Sig Dispense Refill    guaiFENesin 400 MG tablet Take 1 tablet by mouth 4 times daily as needed for Cough      Cetirizine HCl (ZYRTEC ALLERGY PO) Take by mouth      doxycycline hyclate (VIBRAMYCIN) 100 MG capsule Take 1 capsule by mouth 2 times daily for 10 days 20 capsule 0    predniSONE (DELTASONE) 20 MG tablet Take 2 tablets by mouth daily for 5 days 10 tablet 0

## 2025-04-28 ENCOUNTER — OFFICE VISIT (OUTPATIENT)
Dept: PRIMARY CARE CLINIC | Age: 50
End: 2025-04-28
Payer: COMMERCIAL

## 2025-04-28 VITALS
HEIGHT: 65 IN | HEART RATE: 95 BPM | TEMPERATURE: 98.4 F | OXYGEN SATURATION: 97 % | BODY MASS INDEX: 30.99 KG/M2 | DIASTOLIC BLOOD PRESSURE: 80 MMHG | WEIGHT: 186 LBS | SYSTOLIC BLOOD PRESSURE: 116 MMHG

## 2025-04-28 DIAGNOSIS — R06.2 WHEEZING: ICD-10-CM

## 2025-04-28 DIAGNOSIS — R05.3 PERSISTENT COUGH FOR 3 WEEKS OR LONGER: ICD-10-CM

## 2025-04-28 DIAGNOSIS — J18.9 COMMUNITY ACQUIRED PNEUMONIA OF RIGHT UPPER LOBE OF LUNG: Primary | ICD-10-CM

## 2025-04-28 PROCEDURE — 99213 OFFICE O/P EST LOW 20 MIN: CPT

## 2025-04-28 RX ORDER — CEFUROXIME AXETIL 500 MG/1
500 TABLET ORAL 2 TIMES DAILY
Qty: 20 TABLET | Refills: 0 | Status: SHIPPED | OUTPATIENT
Start: 2025-04-28 | End: 2025-05-08

## 2025-04-28 RX ORDER — ALBUTEROL SULFATE 90 UG/1
2 INHALANT RESPIRATORY (INHALATION)
Qty: 54 G | Refills: 0 | Status: SHIPPED | OUTPATIENT
Start: 2025-04-28

## 2025-04-28 ASSESSMENT — ENCOUNTER SYMPTOMS
ABDOMINAL DISTENTION: 0
NAUSEA: 0
EYE DISCHARGE: 0
RHINORRHEA: 0
APNEA: 0
VOMITING: 0
COUGH: 1
BLOOD IN STOOL: 0
CHOKING: 0
SINUS PRESSURE: 0
RECTAL PAIN: 0
WHEEZING: 1
CONSTIPATION: 0
DIARRHEA: 0
HEARTBURN: 0
FACIAL SWELLING: 0
HEMOPTYSIS: 0
VOICE CHANGE: 0
SORE THROAT: 0
PHOTOPHOBIA: 0
TROUBLE SWALLOWING: 0
CHEST TIGHTNESS: 0
SHORTNESS OF BREATH: 0
BACK PAIN: 0
GASTROINTESTINAL NEGATIVE: 1
STRIDOR: 0
EYE ITCHING: 0
EYE PAIN: 0
EYES NEGATIVE: 1
COLOR CHANGE: 0
SINUS PAIN: 0
ABDOMINAL PAIN: 0
EYE REDNESS: 0
ANAL BLEEDING: 0

## 2025-04-28 NOTE — PROGRESS NOTES
[facility], [facility] WALK IN CARE  [address]  [place] [address]    [facility] WALK IN CARE  [address]  [facility] [address]  Dept: [phone]     Shonda Kelsey is a 50 y.o. female Established patient, who presents to the walk-in clinic today with conditions/complaints as noted below:    Chief Complaint   Patient presents with    Congestion     Chest congestion with wheezing. Pt seen 4/21 and states she is not getting better.          HPI:     Cough  This is a new problem. Episode onset: 27 days ago. The problem has been waxing and waning. The cough is Productive of sputum. Associated symptoms include chest pain (mid chest and back) and wheezing. Pertinent negatives include no chills, ear congestion, ear pain, eye redness, fever, headaches, heartburn, hemoptysis, myalgias, nasal congestion, postnasal drip, rash, rhinorrhea, sore throat, shortness of breath, sweats or weight loss. Treatments tried: zpak, doxycycline, promethazine, zyrtec. The treatment provided mild relief.     Patient was seen 4/1 at the ER, completed CXR, diagnosed with viral pneumonia  He did not improve with the treatment provided at the ER, she returned here on 4/21 for repeat chest x-ray, this showed left upper lobe pneumonia, she was prescribed doxycycline.  Today she is day 7 out of 10 of antibiotics.  She reports she did start to feel better for a few days and now her symptoms are returning.  She is having coughing, wheezing, pains of the mid chest anteriorly and posteriorly   Past Medical History:   Diagnosis Date    Headache     Heart palpitations     Hypertension 2022?    Not sure if it was consistent or if it's a thing i have,  at the time.  We're keeping an eye on it [place] if the aneurysm    Hypothyroidism 2008    s/p radioiodine ablation        Current Outpatient Medications   Medication

## 2025-04-30 RX ORDER — POLYETHYLENE GLYCOL 3350 17 G/17G
POWDER, FOR SOLUTION ORAL
Qty: 238 G | Refills: 0 | Status: SHIPPED | OUTPATIENT
Start: 2025-04-30

## 2025-05-01 ENCOUNTER — RESULTS FOLLOW-UP (OUTPATIENT)
Dept: PRIMARY CARE CLINIC | Age: 50
End: 2025-05-01

## 2025-05-15 DIAGNOSIS — J40 BRONCHITIS: ICD-10-CM

## 2025-05-15 RX ORDER — PREDNISONE 20 MG/1
40 TABLET ORAL DAILY
Qty: 10 TABLET | Refills: 0 | OUTPATIENT
Start: 2025-05-15 | End: 2025-05-20

## 2025-06-05 ENCOUNTER — OFFICE VISIT (OUTPATIENT)
Dept: OBGYN CLINIC | Age: 50
End: 2025-06-05
Payer: MEDICAID

## 2025-06-05 VITALS — DIASTOLIC BLOOD PRESSURE: 71 MMHG | HEART RATE: 78 BPM | SYSTOLIC BLOOD PRESSURE: 111 MMHG

## 2025-06-05 DIAGNOSIS — N93.9 ABNORMAL UTERINE BLEEDING: Primary | ICD-10-CM

## 2025-06-05 PROCEDURE — 99203 OFFICE O/P NEW LOW 30 MIN: CPT | Performed by: NURSE PRACTITIONER

## 2025-06-05 PROCEDURE — G8417 CALC BMI ABV UP PARAM F/U: HCPCS | Performed by: NURSE PRACTITIONER

## 2025-06-05 PROCEDURE — G8427 DOCREV CUR MEDS BY ELIG CLIN: HCPCS | Performed by: NURSE PRACTITIONER

## 2025-06-05 PROCEDURE — 1036F TOBACCO NON-USER: CPT | Performed by: NURSE PRACTITIONER

## 2025-06-05 RX ORDER — FERROUS SULFATE 325(65) MG
325 TABLET ORAL
COMMUNITY

## 2025-06-05 ASSESSMENT — ENCOUNTER SYMPTOMS
COUGH: 0
SHORTNESS OF BREATH: 0
RESPIRATORY NEGATIVE: 1
ALLERGIC/IMMUNOLOGIC NEGATIVE: 1
ABDOMINAL DISTENTION: 0
RECTAL PAIN: 1
BACK PAIN: 0

## 2025-06-17 ENCOUNTER — HOSPITAL ENCOUNTER (OUTPATIENT)
Dept: ULTRASOUND IMAGING | Age: 50
Discharge: HOME | End: 2025-06-19
Payer: COMMERCIAL

## 2025-06-17 ENCOUNTER — APPOINTMENT (OUTPATIENT)
Dept: MRI IMAGING | Age: 50
End: 2025-06-17
Attending: PSYCHIATRY & NEUROLOGY
Payer: COMMERCIAL

## 2025-06-17 DIAGNOSIS — N93.9 ABNORMAL UTERINE BLEEDING: ICD-10-CM

## 2025-06-17 PROCEDURE — 76856 US EXAM PELVIC COMPLETE: CPT

## 2025-06-17 PROCEDURE — 76830 TRANSVAGINAL US NON-OB: CPT

## 2025-06-18 ENCOUNTER — RESULTS FOLLOW-UP (OUTPATIENT)
Dept: OBGYN CLINIC | Age: 50
End: 2025-06-18

## 2025-06-30 ENCOUNTER — HOSPITAL ENCOUNTER (OUTPATIENT)
Dept: MRI IMAGING | Age: 50
Discharge: HOME | End: 2025-07-02
Attending: PSYCHIATRY & NEUROLOGY
Payer: COMMERCIAL

## 2025-06-30 DIAGNOSIS — I67.1 ANTERIOR CEREBRAL ANEURYSM: ICD-10-CM

## 2025-06-30 LAB
EGFR, POC: >90 ML/MIN/1.73M2
POC CREATININE: 0.7 MG/DL (ref 0.51–1.19)

## 2025-06-30 PROCEDURE — 6360000004 HC RX CONTRAST MEDICATION: Performed by: PSYCHIATRY & NEUROLOGY

## 2025-06-30 PROCEDURE — 2500000003 HC RX 250 WO HCPCS: Performed by: PSYCHIATRY & NEUROLOGY

## 2025-06-30 PROCEDURE — A9576 INJ PROHANCE MULTIPACK: HCPCS | Performed by: PSYCHIATRY & NEUROLOGY

## 2025-06-30 PROCEDURE — 82565 ASSAY OF CREATININE: CPT

## 2025-06-30 PROCEDURE — 70546 MR ANGIOGRAPH HEAD W/O&W/DYE: CPT

## 2025-06-30 RX ORDER — SODIUM CHLORIDE 0.9 % (FLUSH) 0.9 %
10 SYRINGE (ML) INJECTION PRN
Status: DISCONTINUED | OUTPATIENT
Start: 2025-06-30 | End: 2025-07-03 | Stop reason: HOSPADM

## 2025-06-30 RX ORDER — GADOTERIDOL 279.3 MG/ML
16 INJECTION INTRAVENOUS
Status: COMPLETED | OUTPATIENT
Start: 2025-06-30 | End: 2025-06-30

## 2025-06-30 RX ADMIN — SODIUM CHLORIDE, PRESERVATIVE FREE 10 ML: 5 INJECTION INTRAVENOUS at 17:28

## 2025-06-30 RX ADMIN — GADOTERIDOL 16 ML: 279.3 INJECTION, SOLUTION INTRAVENOUS at 17:28

## 2025-07-21 ENCOUNTER — OFFICE VISIT (OUTPATIENT)
Dept: PULMONOLOGY | Age: 50
End: 2025-07-21
Payer: MEDICAID

## 2025-07-21 VITALS
HEART RATE: 69 BPM | BODY MASS INDEX: 30.16 KG/M2 | SYSTOLIC BLOOD PRESSURE: 131 MMHG | OXYGEN SATURATION: 93 % | HEIGHT: 65 IN | DIASTOLIC BLOOD PRESSURE: 93 MMHG | RESPIRATION RATE: 14 BRPM | WEIGHT: 181 LBS

## 2025-07-21 DIAGNOSIS — R05.2 SUBACUTE COUGH: Primary | ICD-10-CM

## 2025-07-21 PROCEDURE — 1036F TOBACCO NON-USER: CPT | Performed by: INTERNAL MEDICINE

## 2025-07-21 PROCEDURE — G8427 DOCREV CUR MEDS BY ELIG CLIN: HCPCS | Performed by: INTERNAL MEDICINE

## 2025-07-21 PROCEDURE — 99203 OFFICE O/P NEW LOW 30 MIN: CPT | Performed by: INTERNAL MEDICINE

## 2025-07-21 PROCEDURE — G8417 CALC BMI ABV UP PARAM F/U: HCPCS | Performed by: INTERNAL MEDICINE

## 2025-07-22 NOTE — PROGRESS NOTES
PULMONARY MEDICINE OUTPATIENT NOTE     PATIENT:Shonda Kelsey  YOB: 1975  MRN:[id number]     REFERRED BY:Junior Schwab MD      HISTORY     Shonda Kelsey is a 50 y.o. years old female, here for new patient evaluation.     INITIAL VISIT:   LAST VISIT: Visit date not found  DATE OF VISIT: 7/21/2025    CHIEF COMPLIANT:Cough (New patient )    EVALUATION/MANAGEMENT DETAILS FROM PREVIOUS VISITS/ENCOUNTERS:      TOBACCO HISTORY:  She  reports that she has quit smoking. Her smoking use included cigarettes. She has never used smokeless tobacco.    AVOCATION/OCCUPATIONAL EXPOSURE:     Yes No   ASBESTOS [] [x]   SILICA DUST [] [x]   COAL [] [x]   FOUNDRY [] [x]   QUARRY [] [x]   COTTON MILL [] [x]   PETS [] [x]   PARAKEETS  []  [x]   TUBERCULOSIS [] [x]   HOT TUB [] [x]   DRUGS [] [x]   OTHER [] [x]     PULMONARY CO-MORBIDITIES/RISK FACTORS:     Yes No   NASO-BRONCHIAL/ENVIRONMENTAL ALLERGIES [] [x]   EOSINOPHILIA [] [x]   BRONCHIAL ASTHMA [] [x]   CHRONIC BRONCHITIS/EMPHYSEMA/COPD [] [x]   CHRONIC SINUSITIS/POSTNASAL DRIP [] [x]   ACID REFLUX/GERD [] [x]   ACE/ARB USAGE [] [x]   ASPIRIN USAGE [] [x]   CORONARY ARTERY DISEASE [] [x]   CONGESTIVE CARDIAC FAILURE [] [x]   ATRIAL FIBRILLATION [] [x]   AMIODARONE USAGE [] [x]   PULMONARY HYPERTENSION [] [x]   VENOUS THROMBOEMBOLISM [] [x]   END STAGE RENAL DISEASE [] [x]   CHRONIC LIVER DISEASE/CIRRHOSIS [] [x]   CONNECTIVE TISSUE DISORDER [] [x]   INTERSTITIAL LUNG DISEASE [] [x]   CHEST WALL RESTRICTION/DIAPHRAGMATIC DISORDER [] [x]   LUNG CANCER HISTORY [] [x]   RADIATION THERAPY HISTORY [] [x]   MORBID OBESITY [] [x]   AMBULATORY OXYGEN USE [] [x]   NOCTURNAL CPAP USE [] [x]   NOCTURNAL BIPAP/NIV USE [] [x]     PAST MEDICAL HISTORY:      Diagnosis Date    Abnormal uterine bleeding (AUB)     Anemia     Anxiety     Brain aneurysm     Chronic coughing     Female genital prolapse     Frequent urination     Headache     Heart palpitations

## 2025-07-30 ENCOUNTER — OFFICE VISIT (OUTPATIENT)
Age: 50
End: 2025-07-30
Payer: MEDICAID

## 2025-07-30 ENCOUNTER — HOSPITAL ENCOUNTER (OUTPATIENT)
Age: 50
Setting detail: SPECIMEN
Discharge: HOME | End: 2025-07-30

## 2025-07-30 VITALS
WEIGHT: 184 LBS | BODY MASS INDEX: 31.41 KG/M2 | TEMPERATURE: 98 F | HEART RATE: 54 BPM | SYSTOLIC BLOOD PRESSURE: 128 MMHG | OXYGEN SATURATION: 94 % | DIASTOLIC BLOOD PRESSURE: 75 MMHG | HEIGHT: 64 IN

## 2025-07-30 DIAGNOSIS — N95.1 PERIMENOPAUSAL SYMPTOM: ICD-10-CM

## 2025-07-30 DIAGNOSIS — N85.2 ENLARGED UTERUS: ICD-10-CM

## 2025-07-30 DIAGNOSIS — N92.3 IMB (INTERMENSTRUAL BLEEDING): ICD-10-CM

## 2025-07-30 DIAGNOSIS — Z12.4 PAP SMEAR FOR CERVICAL CANCER SCREENING: ICD-10-CM

## 2025-07-30 DIAGNOSIS — D21.9 FIBROIDS: ICD-10-CM

## 2025-07-30 DIAGNOSIS — N93.9 ABNORMAL UTERINE BLEEDING (AUB): Primary | ICD-10-CM

## 2025-07-30 PROCEDURE — G8417 CALC BMI ABV UP PARAM F/U: HCPCS

## 2025-07-30 PROCEDURE — G8427 DOCREV CUR MEDS BY ELIG CLIN: HCPCS

## 2025-07-30 PROCEDURE — 99205 OFFICE O/P NEW HI 60 MIN: CPT

## 2025-07-30 PROCEDURE — 1036F TOBACCO NON-USER: CPT

## 2025-07-30 NOTE — PROGRESS NOTES
[facility] UROGYNECOLOGY AND PELVIC REHABILITATION   [address] [place] [address]  Dept: [phone]  Date: 7/30/2025  Patient Name: Shonda Kelsey    VISIT - NEW PATIENT VISIT     CC: had concerns including New Patient (Patient here to discuss AUB. Patient states since Nov. Periods very irregular. Patient states she will start and continue x 3 months. Patient was told she is anemic and was put on oral iron but still is having irregular bleeding. Patient then stopped bleeding so she also stopped her Iron. Patient then restarted bleeding and has resumed her iron. Patient states her cycle is light. ).    HPI:   History of Present Illness  The patient is a 50-year-old female who presents as a new patient to discuss abnormal bleeding.    She has been experiencing abnormal bleeding for several years, initially attributed to fibroids. However, recent ultrasound results did not confirm this. Her menstrual cycles sometimes lasting for 3 months. She has seen a few gynecology providers in the last few years. The most recent office, suggesting a hysterectomy due to her enlarged uterus.  This last doctor planned an endometrial biopsy, but she did not attend the appointment due to scheduling conflicts. She was informed that an IUD or ablation would not be effective for her condition and was advised to seek further consultation, which let her here to us. Her bleeding is heavy during her period but not in between. Her periods are irregular, sometimes lasting for weeks or months, with intermittent spotting. The color of her blood varies from red to dark brown. She recently had a 2-week break from bleeding, followed by a period that has now lasted for 2 weeks with ongoing spotting. Her typical period lasts 4 to 5 days with one heavy day, but recently she has experienced 2 to 3 heavy days followed by spotting. She has noticed a foul odor in her

## 2025-08-07 ENCOUNTER — ANESTHESIA EVENT (OUTPATIENT)
Dept: OPERATING ROOM | Age: 50
End: 2025-08-07
Payer: MEDICAID

## 2025-08-07 ENCOUNTER — ANESTHESIA (OUTPATIENT)
Dept: OPERATING ROOM | Age: 50
End: 2025-08-07
Payer: MEDICAID

## 2025-08-07 ENCOUNTER — HOSPITAL ENCOUNTER (OUTPATIENT)
Age: 50
Setting detail: OUTPATIENT SURGERY
Discharge: HOME | End: 2025-08-07
Attending: INTERNAL MEDICINE | Admitting: INTERNAL MEDICINE
Payer: MEDICAID

## 2025-08-07 VITALS
RESPIRATION RATE: 14 BRPM | OXYGEN SATURATION: 98 % | TEMPERATURE: 98.1 F | BODY MASS INDEX: 31.41 KG/M2 | WEIGHT: 184 LBS | HEART RATE: 57 BPM | DIASTOLIC BLOOD PRESSURE: 77 MMHG | HEIGHT: 64 IN | SYSTOLIC BLOOD PRESSURE: 116 MMHG

## 2025-08-07 DIAGNOSIS — N93.9 ABNORMAL UTERINE BLEEDING: ICD-10-CM

## 2025-08-07 DIAGNOSIS — I77.72 DISSECTION OF ILIAC ARTERY: ICD-10-CM

## 2025-08-07 DIAGNOSIS — K62.5 BRBPR (BRIGHT RED BLOOD PER RECTUM): ICD-10-CM

## 2025-08-07 DIAGNOSIS — D50.8 ANEMIA, IRON DEFICIENCY, INADEQUATE DIETARY INTAKE: ICD-10-CM

## 2025-08-07 LAB — HCG, PREGNANCY URINE (POC): NEGATIVE

## 2025-08-07 PROCEDURE — 88305 TISSUE EXAM BY PATHOLOGIST: CPT

## 2025-08-07 PROCEDURE — 43239 EGD BIOPSY SINGLE/MULTIPLE: CPT | Performed by: INTERNAL MEDICINE

## 2025-08-07 PROCEDURE — 2580000003 HC RX 258: Performed by: ANESTHESIOLOGY

## 2025-08-07 PROCEDURE — 7100000011 HC PHASE II RECOVERY - ADDTL 15 MIN: Performed by: INTERNAL MEDICINE

## 2025-08-07 PROCEDURE — 3609010600 HC COLONOSCOPY POLYPECTOMY SNARE/COLD BIOPSY: Performed by: INTERNAL MEDICINE

## 2025-08-07 PROCEDURE — 81025 URINE PREGNANCY TEST: CPT

## 2025-08-07 PROCEDURE — 3609012400 HC EGD TRANSORAL BIOPSY SINGLE/MULTIPLE: Performed by: INTERNAL MEDICINE

## 2025-08-07 PROCEDURE — 2709999900 HC NON-CHARGEABLE SUPPLY: Performed by: INTERNAL MEDICINE

## 2025-08-07 PROCEDURE — 7100000010 HC PHASE II RECOVERY - FIRST 15 MIN: Performed by: INTERNAL MEDICINE

## 2025-08-07 PROCEDURE — 6360000002 HC RX W HCPCS: Performed by: NURSE ANESTHETIST, CERTIFIED REGISTERED

## 2025-08-07 PROCEDURE — 3700000000 HC ANESTHESIA ATTENDED CARE: Performed by: INTERNAL MEDICINE

## 2025-08-07 PROCEDURE — 45380 COLONOSCOPY AND BIOPSY: CPT | Performed by: INTERNAL MEDICINE

## 2025-08-07 PROCEDURE — 3700000001 HC ADD 15 MINUTES (ANESTHESIA): Performed by: INTERNAL MEDICINE

## 2025-08-07 RX ORDER — SODIUM CHLORIDE 9 MG/ML
INJECTION, SOLUTION INTRAVENOUS CONTINUOUS
Status: DISCONTINUED | OUTPATIENT
Start: 2025-08-07 | End: 2025-08-07 | Stop reason: HOSPADM

## 2025-08-07 RX ORDER — SODIUM CHLORIDE 0.9 % (FLUSH) 0.9 %
5-40 SYRINGE (ML) INJECTION PRN
Status: CANCELLED | OUTPATIENT
Start: 2025-08-07

## 2025-08-07 RX ORDER — SODIUM CHLORIDE 9 MG/ML
INJECTION, SOLUTION INTRAVENOUS PRN
Status: CANCELLED | OUTPATIENT
Start: 2025-08-07

## 2025-08-07 RX ORDER — IBUPROFEN 200 MG
200 TABLET ORAL EVERY 6 HOURS PRN
COMMUNITY

## 2025-08-07 RX ORDER — ONDANSETRON 2 MG/ML
4 INJECTION INTRAMUSCULAR; INTRAVENOUS
Status: CANCELLED | OUTPATIENT
Start: 2025-08-07

## 2025-08-07 RX ORDER — SODIUM CHLORIDE, SODIUM LACTATE, POTASSIUM CHLORIDE, CALCIUM CHLORIDE 600; 310; 30; 20 MG/100ML; MG/100ML; MG/100ML; MG/100ML
INJECTION, SOLUTION INTRAVENOUS CONTINUOUS
Status: DISCONTINUED | OUTPATIENT
Start: 2025-08-07 | End: 2025-08-07 | Stop reason: HOSPADM

## 2025-08-07 RX ORDER — METOCLOPRAMIDE HYDROCHLORIDE 5 MG/ML
10 INJECTION INTRAMUSCULAR; INTRAVENOUS
Status: CANCELLED | OUTPATIENT
Start: 2025-08-07

## 2025-08-07 RX ORDER — OXYCODONE HYDROCHLORIDE 5 MG/1
5 TABLET ORAL
Refills: 0 | Status: CANCELLED | OUTPATIENT
Start: 2025-08-07

## 2025-08-07 RX ORDER — PROPOFOL 10 MG/ML
INJECTION, EMULSION INTRAVENOUS
Status: DISCONTINUED | OUTPATIENT
Start: 2025-08-07 | End: 2025-08-07 | Stop reason: SDUPTHER

## 2025-08-07 RX ORDER — HYDROMORPHONE HYDROCHLORIDE 1 MG/ML
0.5 INJECTION, SOLUTION INTRAMUSCULAR; INTRAVENOUS; SUBCUTANEOUS EVERY 5 MIN PRN
Refills: 0 | Status: CANCELLED | OUTPATIENT
Start: 2025-08-07

## 2025-08-07 RX ORDER — LIDOCAINE HYDROCHLORIDE 10 MG/ML
1 INJECTION, SOLUTION EPIDURAL; INFILTRATION; INTRACAUDAL; PERINEURAL
Status: DISCONTINUED | OUTPATIENT
Start: 2025-08-08 | End: 2025-08-07 | Stop reason: HOSPADM

## 2025-08-07 RX ORDER — FENTANYL CITRATE 50 UG/ML
25 INJECTION, SOLUTION INTRAMUSCULAR; INTRAVENOUS EVERY 5 MIN PRN
Refills: 0 | Status: CANCELLED | OUTPATIENT
Start: 2025-08-07

## 2025-08-07 RX ORDER — LIDOCAINE HYDROCHLORIDE 20 MG/ML
INJECTION, SOLUTION EPIDURAL; INFILTRATION; INTRACAUDAL; PERINEURAL
Status: DISCONTINUED | OUTPATIENT
Start: 2025-08-07 | End: 2025-08-07 | Stop reason: SDUPTHER

## 2025-08-07 RX ORDER — SODIUM CHLORIDE 0.9 % (FLUSH) 0.9 %
5-40 SYRINGE (ML) INJECTION EVERY 12 HOURS SCHEDULED
Status: CANCELLED | OUTPATIENT
Start: 2025-08-07

## 2025-08-07 RX ADMIN — PROPOFOL 50 MG: 10 INJECTION, EMULSION INTRAVENOUS at 10:13

## 2025-08-07 RX ADMIN — LIDOCAINE HYDROCHLORIDE 50 MG: 20 INJECTION, SOLUTION EPIDURAL; INFILTRATION; INTRACAUDAL; PERINEURAL at 10:13

## 2025-08-07 RX ADMIN — PROPOFOL 150 MCG/KG/MIN: 10 INJECTION, EMULSION INTRAVENOUS at 10:14

## 2025-08-07 RX ADMIN — SODIUM CHLORIDE, SODIUM LACTATE, POTASSIUM CHLORIDE, AND CALCIUM CHLORIDE: .6; .31; .03; .02 INJECTION, SOLUTION INTRAVENOUS at 09:18

## 2025-08-07 RX ADMIN — LIDOCAINE HYDROCHLORIDE 50 MG: 20 INJECTION, SOLUTION EPIDURAL; INFILTRATION; INTRACAUDAL; PERINEURAL at 10:11

## 2025-08-07 ASSESSMENT — PAIN - FUNCTIONAL ASSESSMENT
PAIN_FUNCTIONAL_ASSESSMENT: FACE, LEGS, ACTIVITY, CRY, AND CONSOLABILITY (FLACC)
PAIN_FUNCTIONAL_ASSESSMENT: 0-10

## 2025-08-11 LAB — SURGICAL PATHOLOGY REPORT: NORMAL

## 2025-08-16 LAB — CYTOLOGY REPORT: NORMAL

## (undated) DEVICE — COVER,MAYO STAND,STERILE: Brand: MEDLINE

## (undated) DEVICE — GOWN,AURORA,NONREINFORCED,LARGE: Brand: MEDLINE

## (undated) DEVICE — ENDOSCOPIC KIT 1.1+ 10 FT DE 2 GWN AAMI LEVEL 3

## (undated) DEVICE — BITE BLOCK ENDOSCP AD 60 FR W/ ADJ STRP PLAS GRN BLOX

## (undated) DEVICE — SUTURE VCRL + SZ 0 L27IN ABSRB WHT CT-2 1/2 CIR TAPERPOINT VCP270H

## (undated) DEVICE — CUP MED 60ML 2OZ CLR GRAD DISP PLAS NO LID

## (undated) DEVICE — UNDERPAD HOSP W30XL36IN GRN POLYPR HI ABSRB DISP

## (undated) DEVICE — SUTURE VCRL + SZ 4-0 L18IN ABSRB UD L19MM PS-2 3/8 CIR PRIM VCP496H

## (undated) DEVICE — 3M™ WARMING BLANKET, UPPER BODY, 10 PER CASE, 42268: Brand: BAIR HUGGER™

## (undated) DEVICE — FORCEPS BX 240CM 2.4MM L NDL RAD JAW 4 M00513334

## (undated) DEVICE — HYPODERMIC SAFETY NEEDLE: Brand: MAGELLAN

## (undated) DEVICE — SUTURE MCRYL + SZ 4-0 L27IN ABSRB UD L19MM PS-2 3/8 CIR MCP426H

## (undated) DEVICE — GLOVE ORANGE PI 7   MSG9070

## (undated) DEVICE — HANDLE SUCT REG CAP FLANGETIP SMOOTH YANK

## (undated) DEVICE — GAUZE,SPONGE,4"X4",16PLY,XRAY,STRL,LF: Brand: MEDLINE

## (undated) DEVICE — MANIFOLD SUCT 4 PRT 2 CANSTR FLTR DISP NEPTUNE 2

## (undated) DEVICE — GLOVE ORTHO 8   MSG9480

## (undated) DEVICE — SINGLE PORT MANIFOLD: Brand: NEPTUNE 2

## (undated) DEVICE — SHEET, T, LAPAROTOMY, STERILE: Brand: MEDLINE

## (undated) DEVICE — SUTURE PDS + SZ 0 L27IN ABSRB VLT L26MM CT 2 1 2 CIR PDP334H

## (undated) DEVICE — SKIN AFFIX SURG ADHESIVE 72/CS 0.55ML: Brand: MEDLINE

## (undated) DEVICE — ST CHARLES MINOR ABDOMINAL PK: Brand: MEDLINE INDUSTRIES, INC.